# Patient Record
Sex: MALE | Race: WHITE | Employment: OTHER | URBAN - METROPOLITAN AREA
[De-identification: names, ages, dates, MRNs, and addresses within clinical notes are randomized per-mention and may not be internally consistent; named-entity substitution may affect disease eponyms.]

---

## 2017-01-05 ENCOUNTER — OFFICE VISIT (OUTPATIENT)
Dept: INTERNAL MEDICINE CLINIC | Age: 47
End: 2017-01-05

## 2017-01-05 VITALS
RESPIRATION RATE: 16 BRPM | WEIGHT: 216.8 LBS | SYSTOLIC BLOOD PRESSURE: 104 MMHG | DIASTOLIC BLOOD PRESSURE: 74 MMHG | TEMPERATURE: 98.2 F | BODY MASS INDEX: 30.35 KG/M2 | HEIGHT: 71 IN | HEART RATE: 76 BPM

## 2017-01-05 DIAGNOSIS — Z00.00 PHYSICAL EXAM: Primary | ICD-10-CM

## 2017-01-05 DIAGNOSIS — M50.00 DISC DISEASE WITH MYELOPATHY, CERVICAL: ICD-10-CM

## 2017-01-05 DIAGNOSIS — L30.9 ECZEMA, UNSPECIFIED TYPE: ICD-10-CM

## 2017-01-05 DIAGNOSIS — F17.200 SMOKER: ICD-10-CM

## 2017-01-05 DIAGNOSIS — F32.4 MAJOR DEPRESSIVE DISORDER WITH SINGLE EPISODE, IN PARTIAL REMISSION (HCC): ICD-10-CM

## 2017-01-05 DIAGNOSIS — C67.1 MALIGNANT NEOPLASM OF DOME OF URINARY BLADDER (HCC): ICD-10-CM

## 2017-01-05 DIAGNOSIS — J44.9 COPD MIXED TYPE (HCC): ICD-10-CM

## 2017-01-05 PROCEDURE — 99396 PREV VISIT EST AGE 40-64: CPT | Performed by: INTERNAL MEDICINE

## 2017-01-05 RX ORDER — DULOXETIN HYDROCHLORIDE 60 MG/1
60 CAPSULE, DELAYED RELEASE ORAL DAILY
Qty: 30 CAPSULE | Refills: 5 | Status: SHIPPED | OUTPATIENT
Start: 2017-01-05 | End: 2017-10-13 | Stop reason: SDUPTHER

## 2017-01-05 RX ORDER — BETAMETHASONE DIPROPIONATE 0.05 %
OINTMENT (GRAM) TOPICAL
Qty: 45 G | Refills: 1 | Status: SHIPPED | OUTPATIENT
Start: 2017-01-05 | End: 2018-12-06

## 2017-01-05 RX ORDER — BUDESONIDE AND FORMOTEROL FUMARATE DIHYDRATE 160; 4.5 UG/1; UG/1
2 AEROSOL RESPIRATORY (INHALATION) 2 TIMES DAILY
Qty: 1 INHALER | Refills: 5 | Status: SHIPPED | OUTPATIENT
Start: 2017-01-05 | End: 2017-01-30

## 2017-01-05 RX ORDER — TIZANIDINE 4 MG/1
4 TABLET ORAL 3 TIMES DAILY
Qty: 90 TABLET | Refills: 5 | Status: SHIPPED | OUTPATIENT
Start: 2017-01-05 | End: 2018-10-11 | Stop reason: SDUPTHER

## 2017-01-05 ASSESSMENT — ENCOUNTER SYMPTOMS
VOMITING: 0
CONSTIPATION: 0
ABDOMINAL PAIN: 0
CHEST TIGHTNESS: 0
COUGH: 1
SINUS PRESSURE: 0
NAUSEA: 0
DIARRHEA: 0
TROUBLE SWALLOWING: 1
BACK PAIN: 0
VOICE CHANGE: 1
SHORTNESS OF BREATH: 1

## 2017-01-07 ASSESSMENT — ENCOUNTER SYMPTOMS: WHEEZING: 1

## 2017-01-30 ENCOUNTER — OFFICE VISIT (OUTPATIENT)
Dept: INTERNAL MEDICINE CLINIC | Age: 47
End: 2017-01-30

## 2017-01-30 VITALS
RESPIRATION RATE: 16 BRPM | HEART RATE: 68 BPM | OXYGEN SATURATION: 95 % | SYSTOLIC BLOOD PRESSURE: 106 MMHG | DIASTOLIC BLOOD PRESSURE: 66 MMHG | TEMPERATURE: 98.4 F | BODY MASS INDEX: 31.13 KG/M2 | WEIGHT: 224.8 LBS

## 2017-01-30 DIAGNOSIS — J44.1 ACUTE EXACERBATION OF CHRONIC OBSTRUCTIVE PULMONARY DISEASE (COPD) (HCC): Primary | ICD-10-CM

## 2017-01-30 PROCEDURE — 99213 OFFICE O/P EST LOW 20 MIN: CPT | Performed by: INTERNAL MEDICINE

## 2017-01-30 RX ORDER — ALBUTEROL SULFATE 90 UG/1
2 AEROSOL, METERED RESPIRATORY (INHALATION) EVERY 6 HOURS PRN
Qty: 1 INHALER | Refills: 3 | Status: SHIPPED | OUTPATIENT
Start: 2017-01-30 | End: 2020-10-19 | Stop reason: SDUPTHER

## 2017-01-30 RX ORDER — BUDESONIDE AND FORMOTEROL FUMARATE DIHYDRATE 160; 4.5 UG/1; UG/1
2 AEROSOL RESPIRATORY (INHALATION) 2 TIMES DAILY
Qty: 1 INHALER | Refills: 5 | Status: SHIPPED | OUTPATIENT
Start: 2017-01-30 | End: 2020-09-10 | Stop reason: SDUPTHER

## 2017-01-30 RX ORDER — PREDNISONE 10 MG/1
TABLET ORAL
Qty: 22 TABLET | Refills: 0 | Status: SHIPPED | OUTPATIENT
Start: 2017-01-30 | End: 2017-02-09

## 2017-01-30 RX ORDER — HYDROCODONE BITARTRATE AND ACETAMINOPHEN 10; 325 MG/1; MG/1
TABLET ORAL 4 TIMES DAILY
COMMUNITY
Start: 2017-01-12 | End: 2017-08-30 | Stop reason: ALTCHOICE

## 2017-01-30 RX ORDER — LEVOFLOXACIN 500 MG/1
500 TABLET, FILM COATED ORAL DAILY
Qty: 10 TABLET | Refills: 0 | Status: SHIPPED | OUTPATIENT
Start: 2017-01-30 | End: 2017-02-09

## 2017-01-30 ASSESSMENT — ENCOUNTER SYMPTOMS
WHEEZING: 1
DIARRHEA: 0
VOMITING: 0
SHORTNESS OF BREATH: 1
COUGH: 1
RHINORRHEA: 1
BACK PAIN: 1
CONSTIPATION: 0
CHEST TIGHTNESS: 1
ABDOMINAL PAIN: 0
SINUS PRESSURE: 1
NAUSEA: 0

## 2017-02-01 ENCOUNTER — TELEPHONE (OUTPATIENT)
Dept: INTERNAL MEDICINE CLINIC | Age: 47
End: 2017-02-01

## 2017-02-13 ENCOUNTER — OFFICE VISIT (OUTPATIENT)
Dept: INTERNAL MEDICINE CLINIC | Age: 47
End: 2017-02-13

## 2017-02-13 VITALS
RESPIRATION RATE: 16 BRPM | TEMPERATURE: 97.8 F | WEIGHT: 220.8 LBS | HEART RATE: 88 BPM | SYSTOLIC BLOOD PRESSURE: 120 MMHG | BODY MASS INDEX: 30.58 KG/M2 | DIASTOLIC BLOOD PRESSURE: 72 MMHG

## 2017-02-13 DIAGNOSIS — J44.9 COPD MIXED TYPE (HCC): ICD-10-CM

## 2017-02-13 DIAGNOSIS — G89.4 CHRONIC PAIN SYNDROME: ICD-10-CM

## 2017-02-13 DIAGNOSIS — M50.00 DISC DISEASE WITH MYELOPATHY, CERVICAL: ICD-10-CM

## 2017-02-13 DIAGNOSIS — M19.049 HAND ARTHRITIS: Primary | ICD-10-CM

## 2017-02-13 DIAGNOSIS — F32.4 MAJOR DEPRESSIVE DISORDER WITH SINGLE EPISODE, IN PARTIAL REMISSION (HCC): ICD-10-CM

## 2017-02-13 PROCEDURE — 99212 OFFICE O/P EST SF 10 MIN: CPT | Performed by: INTERNAL MEDICINE

## 2017-02-13 RX ORDER — GABAPENTIN 800 MG/1
800 TABLET ORAL 3 TIMES DAILY
Qty: 90 TABLET | Refills: 3 | Status: SHIPPED | OUTPATIENT
Start: 2017-02-13 | End: 2018-12-06 | Stop reason: DRUGHIGH

## 2017-02-13 RX ORDER — LIDOCAINE 50 MG/G
OINTMENT TOPICAL
Qty: 35 G | Refills: 1 | Status: SHIPPED | OUTPATIENT
Start: 2017-02-13 | End: 2018-12-06

## 2017-02-13 ASSESSMENT — ENCOUNTER SYMPTOMS
VOMITING: 0
SINUS PRESSURE: 0
WHEEZING: 0
BACK PAIN: 0
ABDOMINAL PAIN: 0
NAUSEA: 0
CHEST TIGHTNESS: 0
DIARRHEA: 0
SHORTNESS OF BREATH: 0
COUGH: 1
VOICE CHANGE: 1
TROUBLE SWALLOWING: 1
CONSTIPATION: 0

## 2017-03-21 ENCOUNTER — TELEPHONE (OUTPATIENT)
Dept: INTERNAL MEDICINE CLINIC | Age: 47
End: 2017-03-21

## 2017-03-21 RX ORDER — AMOXICILLIN AND CLAVULANATE POTASSIUM 875; 125 MG/1; MG/1
1 TABLET, FILM COATED ORAL 2 TIMES DAILY
Qty: 20 TABLET | Refills: 0 | Status: SHIPPED | OUTPATIENT
Start: 2017-03-21 | End: 2017-03-31

## 2017-03-24 ENCOUNTER — TELEPHONE (OUTPATIENT)
Dept: INTERNAL MEDICINE CLINIC | Age: 47
End: 2017-03-24

## 2017-03-24 DIAGNOSIS — G89.4 CHRONIC PAIN SYNDROME: Primary | ICD-10-CM

## 2017-03-24 DIAGNOSIS — M96.1 POST LAMINECTOMY SYNDROME: ICD-10-CM

## 2017-03-24 DIAGNOSIS — M15.9 PRIMARY OSTEOARTHRITIS INVOLVING MULTIPLE JOINTS: ICD-10-CM

## 2017-03-24 DIAGNOSIS — M50.90 CERVICAL DISC DISEASE: ICD-10-CM

## 2017-03-27 ENCOUNTER — TELEPHONE (OUTPATIENT)
Dept: INTERNAL MEDICINE CLINIC | Age: 47
End: 2017-03-27

## 2017-03-28 ENCOUNTER — TELEPHONE (OUTPATIENT)
Dept: INTERNAL MEDICINE CLINIC | Age: 47
End: 2017-03-28

## 2017-03-30 ENCOUNTER — TELEPHONE (OUTPATIENT)
Dept: INTERNAL MEDICINE CLINIC | Age: 47
End: 2017-03-30

## 2017-03-30 DIAGNOSIS — C67.9 MALIGNANT NEOPLASM OF URINARY BLADDER, UNSPECIFIED SITE (HCC): Primary | ICD-10-CM

## 2017-04-10 ENCOUNTER — TELEPHONE (OUTPATIENT)
Dept: INTERNAL MEDICINE CLINIC | Age: 47
End: 2017-04-10

## 2017-04-11 ENCOUNTER — TELEPHONE (OUTPATIENT)
Dept: INTERNAL MEDICINE CLINIC | Age: 47
End: 2017-04-11

## 2017-04-17 ENCOUNTER — TELEPHONE (OUTPATIENT)
Dept: INTERNAL MEDICINE CLINIC | Age: 47
End: 2017-04-17

## 2017-04-17 DIAGNOSIS — C67.9 MALIGNANT NEOPLASM OF URINARY BLADDER, UNSPECIFIED SITE (HCC): ICD-10-CM

## 2017-04-17 DIAGNOSIS — Z51.81 MEDICATION MONITORING ENCOUNTER: ICD-10-CM

## 2017-04-17 DIAGNOSIS — M15.9 PRIMARY OSTEOARTHRITIS INVOLVING MULTIPLE JOINTS: Primary | ICD-10-CM

## 2017-04-17 DIAGNOSIS — Z13.220 SCREENING CHOLESTEROL LEVEL: ICD-10-CM

## 2017-04-18 ENCOUNTER — TELEPHONE (OUTPATIENT)
Dept: INTERNAL MEDICINE CLINIC | Age: 47
End: 2017-04-18

## 2017-04-22 LAB — GLUCOSE BLD-MCNC: 116 MG/DL

## 2017-04-25 DIAGNOSIS — M15.9 PRIMARY OSTEOARTHRITIS INVOLVING MULTIPLE JOINTS: ICD-10-CM

## 2017-04-25 DIAGNOSIS — Z51.81 MEDICATION MONITORING ENCOUNTER: ICD-10-CM

## 2017-04-25 DIAGNOSIS — C67.9 MALIGNANT NEOPLASM OF URINARY BLADDER, UNSPECIFIED SITE (HCC): ICD-10-CM

## 2017-04-25 DIAGNOSIS — Z13.220 SCREENING CHOLESTEROL LEVEL: ICD-10-CM

## 2017-04-25 RX ORDER — ATORVASTATIN CALCIUM 20 MG/1
20 TABLET, FILM COATED ORAL NIGHTLY
Qty: 30 TABLET | Refills: 2 | Status: SHIPPED | OUTPATIENT
Start: 2017-04-25 | End: 2018-01-30 | Stop reason: SDUPTHER

## 2017-06-14 LAB
CHOLESTEROL, TOTAL: 288 MG/DL
CHOLESTEROL/HDL RATIO: 7
HDLC SERPL-MCNC: 42 MG/DL (ref 35–70)
LDL CHOLESTEROL CALCULATED: 187 MG/DL (ref 0–160)
TRIGL SERPL-MCNC: 295 MG/DL
VLDLC SERPL CALC-MCNC: 59 MG/DL

## 2017-06-20 DIAGNOSIS — M54.2 CERVICAL PAIN (NECK): Primary | ICD-10-CM

## 2017-07-03 ENCOUNTER — NURSE ONLY (OUTPATIENT)
Dept: INTERNAL MEDICINE CLINIC | Age: 47
End: 2017-07-03

## 2017-07-03 ENCOUNTER — TELEPHONE (OUTPATIENT)
Dept: INTERNAL MEDICINE CLINIC | Age: 47
End: 2017-07-03

## 2017-07-03 DIAGNOSIS — Z00.00 ROUTINE GENERAL MEDICAL EXAMINATION AT A HEALTH CARE FACILITY: Primary | ICD-10-CM

## 2017-07-03 DIAGNOSIS — C67.1 MALIGNANT NEOPLASM OF DOME OF URINARY BLADDER (HCC): ICD-10-CM

## 2017-07-03 DIAGNOSIS — M17.31 POST-TRAUMATIC OSTEOARTHRITIS OF RIGHT KNEE: ICD-10-CM

## 2017-07-03 DIAGNOSIS — M50.00 DISC DISEASE WITH MYELOPATHY, CERVICAL: Primary | ICD-10-CM

## 2017-07-03 DIAGNOSIS — J44.9 COPD MIXED TYPE (HCC): ICD-10-CM

## 2017-07-03 LAB
A/G RATIO: 2 (ref 1.1–2.2)
ALBUMIN SERPL-MCNC: 4.7 G/DL (ref 3.4–5)
ALP BLD-CCNC: 76 U/L (ref 40–129)
ALT SERPL-CCNC: 17 U/L (ref 10–40)
ANION GAP SERPL CALCULATED.3IONS-SCNC: 16 MMOL/L (ref 3–16)
AST SERPL-CCNC: 13 U/L (ref 15–37)
BILIRUB SERPL-MCNC: 0.5 MG/DL (ref 0–1)
BUN BLDV-MCNC: 8 MG/DL (ref 7–20)
CALCIUM SERPL-MCNC: 10.1 MG/DL (ref 8.3–10.6)
CHLORIDE BLD-SCNC: 102 MMOL/L (ref 99–110)
CO2: 26 MMOL/L (ref 21–32)
CREAT SERPL-MCNC: 0.9 MG/DL (ref 0.9–1.3)
GFR AFRICAN AMERICAN: >60
GFR NON-AFRICAN AMERICAN: >60
GLOBULIN: 2.3 G/DL
GLUCOSE BLD-MCNC: 104 MG/DL (ref 70–99)
HCT VFR BLD CALC: 48.5 % (ref 40.5–52.5)
HEMOGLOBIN: 16.2 G/DL (ref 13.5–17.5)
MCH RBC QN AUTO: 32.8 PG (ref 26–34)
MCHC RBC AUTO-ENTMCNC: 33.4 G/DL (ref 31–36)
MCV RBC AUTO: 98.2 FL (ref 80–100)
PDW BLD-RTO: 13.3 % (ref 12.4–15.4)
PLATELET # BLD: 132 K/UL (ref 135–450)
PMV BLD AUTO: 11.7 FL (ref 5–10.5)
POTASSIUM SERPL-SCNC: 4 MMOL/L (ref 3.5–5.1)
RBC # BLD: 4.94 M/UL (ref 4.2–5.9)
SODIUM BLD-SCNC: 144 MMOL/L (ref 136–145)
TOTAL PROTEIN: 7 G/DL (ref 6.4–8.2)
WBC # BLD: 10.1 K/UL (ref 4–11)

## 2017-07-03 PROCEDURE — 36415 COLL VENOUS BLD VENIPUNCTURE: CPT | Performed by: INTERNAL MEDICINE

## 2017-08-21 ENCOUNTER — TELEPHONE (OUTPATIENT)
Dept: INTERNAL MEDICINE CLINIC | Age: 47
End: 2017-08-21

## 2017-08-21 DIAGNOSIS — G89.4 CHRONIC PAIN SYNDROME: Primary | ICD-10-CM

## 2017-08-30 ENCOUNTER — OFFICE VISIT (OUTPATIENT)
Dept: INTERNAL MEDICINE CLINIC | Age: 47
End: 2017-08-30

## 2017-08-30 VITALS
HEIGHT: 71 IN | SYSTOLIC BLOOD PRESSURE: 114 MMHG | WEIGHT: 224 LBS | DIASTOLIC BLOOD PRESSURE: 86 MMHG | HEART RATE: 84 BPM | RESPIRATION RATE: 18 BRPM | BODY MASS INDEX: 31.36 KG/M2 | OXYGEN SATURATION: 94 %

## 2017-08-30 DIAGNOSIS — M50.00 CERVICAL DISC DISEASE WITH MYELOPATHY: ICD-10-CM

## 2017-08-30 DIAGNOSIS — R60.0 LOCALIZED EDEMA: Primary | ICD-10-CM

## 2017-08-30 DIAGNOSIS — Z51.81 MEDICATION MONITORING ENCOUNTER: ICD-10-CM

## 2017-08-30 DIAGNOSIS — C67.9 MALIGNANT NEOPLASM OF URINARY BLADDER, UNSPECIFIED SITE (HCC): ICD-10-CM

## 2017-08-30 DIAGNOSIS — G89.4 CHRONIC PAIN SYNDROME: ICD-10-CM

## 2017-08-30 LAB
A/G RATIO: 1.6 (ref 1.1–2.2)
ALBUMIN SERPL-MCNC: 4.2 G/DL (ref 3.4–5)
ALP BLD-CCNC: 71 U/L (ref 40–129)
ALT SERPL-CCNC: 21 U/L (ref 10–40)
ANION GAP SERPL CALCULATED.3IONS-SCNC: 14 MMOL/L (ref 3–16)
AST SERPL-CCNC: 17 U/L (ref 15–37)
BILIRUB SERPL-MCNC: 0.3 MG/DL (ref 0–1)
BUN BLDV-MCNC: 7 MG/DL (ref 7–20)
CALCIUM SERPL-MCNC: 9.9 MG/DL (ref 8.3–10.6)
CHLORIDE BLD-SCNC: 100 MMOL/L (ref 99–110)
CO2: 25 MMOL/L (ref 21–32)
CREAT SERPL-MCNC: 0.7 MG/DL (ref 0.9–1.3)
GFR AFRICAN AMERICAN: >60
GFR NON-AFRICAN AMERICAN: >60
GLOBULIN: 2.6 G/DL
GLUCOSE FASTING: 141 MG/DL (ref 70–99)
HCT VFR BLD CALC: 46.3 % (ref 40.5–52.5)
HEMOGLOBIN: 15.8 G/DL (ref 13.5–17.5)
MCH RBC QN AUTO: 33.1 PG (ref 26–34)
MCHC RBC AUTO-ENTMCNC: 34.2 G/DL (ref 31–36)
MCV RBC AUTO: 96.9 FL (ref 80–100)
PDW BLD-RTO: 13.3 % (ref 12.4–15.4)
PLATELET # BLD: 150 K/UL (ref 135–450)
PMV BLD AUTO: 10.6 FL (ref 5–10.5)
POTASSIUM SERPL-SCNC: 4 MMOL/L (ref 3.5–5.1)
RBC # BLD: 4.77 M/UL (ref 4.2–5.9)
SODIUM BLD-SCNC: 139 MMOL/L (ref 136–145)
TOTAL PROTEIN: 6.8 G/DL (ref 6.4–8.2)
WBC # BLD: 8.5 K/UL (ref 4–11)

## 2017-08-30 PROCEDURE — 36415 COLL VENOUS BLD VENIPUNCTURE: CPT | Performed by: INTERNAL MEDICINE

## 2017-08-30 PROCEDURE — 99214 OFFICE O/P EST MOD 30 MIN: CPT | Performed by: INTERNAL MEDICINE

## 2017-08-30 RX ORDER — FUROSEMIDE 40 MG/1
TABLET ORAL
Qty: 30 TABLET | Refills: 3 | Status: SHIPPED | OUTPATIENT
Start: 2017-08-30 | End: 2018-12-06 | Stop reason: ALTCHOICE

## 2017-08-30 RX ORDER — CYCLOBENZAPRINE HCL 5 MG
TABLET ORAL
COMMUNITY
Start: 2017-07-11 | End: 2018-10-11 | Stop reason: ALTCHOICE

## 2017-08-30 RX ORDER — OXYCODONE AND ACETAMINOPHEN 10; 325 MG/1; MG/1
TABLET ORAL
COMMUNITY
Start: 2016-11-09 | End: 2017-09-12 | Stop reason: SDUPTHER

## 2017-08-30 RX ORDER — FUROSEMIDE 20 MG/1
20 TABLET ORAL DAILY
Qty: 30 TABLET | Refills: 1 | Status: SHIPPED | OUTPATIENT
Start: 2017-08-30 | End: 2017-08-30

## 2017-08-31 ENCOUNTER — TELEPHONE (OUTPATIENT)
Dept: INTERNAL MEDICINE CLINIC | Age: 47
End: 2017-08-31

## 2017-09-04 ENCOUNTER — TELEPHONE (OUTPATIENT)
Dept: INTERNAL MEDICINE CLINIC | Age: 47
End: 2017-09-04

## 2017-09-04 ASSESSMENT — ENCOUNTER SYMPTOMS
CONSTIPATION: 0
NAUSEA: 0
COUGH: 0
ABDOMINAL PAIN: 0
CHEST TIGHTNESS: 0
BACK PAIN: 0
DIARRHEA: 0
VOMITING: 0
WHEEZING: 0
SHORTNESS OF BREATH: 0

## 2017-09-05 ENCOUNTER — TELEPHONE (OUTPATIENT)
Dept: INTERNAL MEDICINE CLINIC | Age: 47
End: 2017-09-05

## 2017-09-06 ENCOUNTER — TELEPHONE (OUTPATIENT)
Dept: INTERNAL MEDICINE CLINIC | Age: 47
End: 2017-09-06

## 2017-09-12 ENCOUNTER — TELEPHONE (OUTPATIENT)
Dept: INTERNAL MEDICINE CLINIC | Age: 47
End: 2017-09-12

## 2017-09-12 RX ORDER — OXYCODONE AND ACETAMINOPHEN 10; 325 MG/1; MG/1
1 TABLET ORAL EVERY 6 HOURS PRN
Qty: 28 TABLET | Refills: 0 | Status: SHIPPED | OUTPATIENT
Start: 2017-09-12 | End: 2017-09-19

## 2017-09-14 ENCOUNTER — TELEPHONE (OUTPATIENT)
Dept: INTERNAL MEDICINE CLINIC | Age: 47
End: 2017-09-14

## 2017-10-13 DIAGNOSIS — M50.00 DISC DISEASE WITH MYELOPATHY, CERVICAL: ICD-10-CM

## 2017-10-13 DIAGNOSIS — F32.4 MAJOR DEPRESSIVE DISORDER WITH SINGLE EPISODE, IN PARTIAL REMISSION (HCC): ICD-10-CM

## 2017-10-16 RX ORDER — DULOXETIN HYDROCHLORIDE 60 MG/1
CAPSULE, DELAYED RELEASE ORAL
Qty: 30 CAPSULE | Refills: 5 | Status: SHIPPED | OUTPATIENT
Start: 2017-10-16 | End: 2018-04-27 | Stop reason: SDUPTHER

## 2018-01-31 RX ORDER — ATORVASTATIN CALCIUM 20 MG/1
TABLET, FILM COATED ORAL
Qty: 90 TABLET | Refills: 1 | Status: SHIPPED | OUTPATIENT
Start: 2018-01-31 | End: 2018-09-04 | Stop reason: SDUPTHER

## 2018-04-27 ENCOUNTER — TELEPHONE (OUTPATIENT)
Dept: INTERNAL MEDICINE CLINIC | Age: 48
End: 2018-04-27

## 2018-04-27 DIAGNOSIS — M50.00 DISC DISEASE WITH MYELOPATHY, CERVICAL: ICD-10-CM

## 2018-04-27 DIAGNOSIS — F32.4 MAJOR DEPRESSIVE DISORDER WITH SINGLE EPISODE, IN PARTIAL REMISSION (HCC): ICD-10-CM

## 2018-04-27 RX ORDER — DULOXETIN HYDROCHLORIDE 60 MG/1
CAPSULE, DELAYED RELEASE ORAL
Qty: 30 CAPSULE | Refills: 5 | Status: SHIPPED | OUTPATIENT
Start: 2018-04-27 | End: 2019-03-06 | Stop reason: ALTCHOICE

## 2018-09-04 ENCOUNTER — OFFICE VISIT (OUTPATIENT)
Dept: INTERNAL MEDICINE CLINIC | Age: 48
End: 2018-09-04

## 2018-09-04 VITALS
BODY MASS INDEX: 32.48 KG/M2 | OXYGEN SATURATION: 96 % | HEART RATE: 85 BPM | WEIGHT: 232 LBS | DIASTOLIC BLOOD PRESSURE: 70 MMHG | HEIGHT: 71 IN | RESPIRATION RATE: 16 BRPM | SYSTOLIC BLOOD PRESSURE: 130 MMHG

## 2018-09-04 DIAGNOSIS — F17.200 SMOKER: ICD-10-CM

## 2018-09-04 DIAGNOSIS — R73.9 HYPERGLYCEMIA: ICD-10-CM

## 2018-09-04 DIAGNOSIS — G62.9 NEUROPATHY: ICD-10-CM

## 2018-09-04 DIAGNOSIS — G89.4 CHRONIC PAIN SYNDROME: Primary | ICD-10-CM

## 2018-09-04 DIAGNOSIS — E78.00 HYPERCHOLESTEREMIA: ICD-10-CM

## 2018-09-04 DIAGNOSIS — F32.1 CURRENT MODERATE EPISODE OF MAJOR DEPRESSIVE DISORDER WITHOUT PRIOR EPISODE (HCC): ICD-10-CM

## 2018-09-04 DIAGNOSIS — M50.00 CERVICAL DISC DISEASE WITH MYELOPATHY: ICD-10-CM

## 2018-09-04 DIAGNOSIS — J44.9 CHRONIC OBSTRUCTIVE PULMONARY DISEASE, UNSPECIFIED COPD TYPE (HCC): ICD-10-CM

## 2018-09-04 LAB
A/G RATIO: 2.1 (ref 1.1–2.2)
ALBUMIN SERPL-MCNC: 4.8 G/DL (ref 3.4–5)
ALP BLD-CCNC: 81 U/L (ref 40–129)
ALT SERPL-CCNC: 33 U/L (ref 10–40)
ANION GAP SERPL CALCULATED.3IONS-SCNC: 12 MMOL/L (ref 3–16)
AST SERPL-CCNC: 21 U/L (ref 15–37)
BASOPHILS ABSOLUTE: 0 K/UL (ref 0–0.2)
BASOPHILS RELATIVE PERCENT: 0.4 %
BILIRUB SERPL-MCNC: 0.3 MG/DL (ref 0–1)
BUN BLDV-MCNC: 7 MG/DL (ref 7–20)
CALCIUM SERPL-MCNC: 9.8 MG/DL (ref 8.3–10.6)
CHLORIDE BLD-SCNC: 103 MMOL/L (ref 99–110)
CHOLESTEROL, TOTAL: 240 MG/DL (ref 0–199)
CO2: 27 MMOL/L (ref 21–32)
CREAT SERPL-MCNC: 0.8 MG/DL (ref 0.9–1.3)
EOSINOPHILS ABSOLUTE: 0.2 K/UL (ref 0–0.6)
EOSINOPHILS RELATIVE PERCENT: 1.7 %
GFR AFRICAN AMERICAN: >60
GFR NON-AFRICAN AMERICAN: >60
GLOBULIN: 2.3 G/DL
GLUCOSE BLD-MCNC: 202 MG/DL (ref 70–99)
HCT VFR BLD CALC: 50 % (ref 40.5–52.5)
HDLC SERPL-MCNC: 41 MG/DL (ref 40–60)
HEMOGLOBIN: 16.9 G/DL (ref 13.5–17.5)
LDL CHOLESTEROL CALCULATED: 155 MG/DL
LYMPHOCYTES ABSOLUTE: 3.4 K/UL (ref 1–5.1)
LYMPHOCYTES RELATIVE PERCENT: 28.5 %
MCH RBC QN AUTO: 33.6 PG (ref 26–34)
MCHC RBC AUTO-ENTMCNC: 33.8 G/DL (ref 31–36)
MCV RBC AUTO: 99.2 FL (ref 80–100)
MONOCYTES ABSOLUTE: 1.2 K/UL (ref 0–1.3)
MONOCYTES RELATIVE PERCENT: 9.7 %
NEUTROPHILS ABSOLUTE: 7.1 K/UL (ref 1.7–7.7)
NEUTROPHILS RELATIVE PERCENT: 59.7 %
PDW BLD-RTO: 13.5 % (ref 12.4–15.4)
PLATELET # BLD: 148 K/UL (ref 135–450)
PMV BLD AUTO: 11.4 FL (ref 5–10.5)
POTASSIUM SERPL-SCNC: 4.4 MMOL/L (ref 3.5–5.1)
RBC # BLD: 5.04 M/UL (ref 4.2–5.9)
SODIUM BLD-SCNC: 142 MMOL/L (ref 136–145)
TOTAL PROTEIN: 7.1 G/DL (ref 6.4–8.2)
TRIGL SERPL-MCNC: 222 MG/DL (ref 0–150)
TSH SERPL DL<=0.05 MIU/L-ACNC: 1.5 UIU/ML (ref 0.27–4.2)
VITAMIN B-12: 578 PG/ML (ref 211–911)
VLDLC SERPL CALC-MCNC: 44 MG/DL
WBC # BLD: 12 K/UL (ref 4–11)

## 2018-09-04 PROCEDURE — G0444 DEPRESSION SCREEN ANNUAL: HCPCS | Performed by: INTERNAL MEDICINE

## 2018-09-04 PROCEDURE — 90732 PPSV23 VACC 2 YRS+ SUBQ/IM: CPT | Performed by: INTERNAL MEDICINE

## 2018-09-04 PROCEDURE — 99214 OFFICE O/P EST MOD 30 MIN: CPT | Performed by: INTERNAL MEDICINE

## 2018-09-04 PROCEDURE — 90471 IMMUNIZATION ADMIN: CPT | Performed by: INTERNAL MEDICINE

## 2018-09-04 RX ORDER — DULOXETIN HYDROCHLORIDE 30 MG/1
30 CAPSULE, DELAYED RELEASE ORAL DAILY
Qty: 30 CAPSULE | Refills: 3 | Status: SHIPPED | OUTPATIENT
Start: 2018-09-04 | End: 2019-01-23 | Stop reason: SDUPTHER

## 2018-09-04 RX ORDER — OXYCODONE HYDROCHLORIDE 10 MG/1
10 TABLET ORAL EVERY 8 HOURS PRN
Qty: 21 TABLET | Refills: 0 | Status: SHIPPED | OUTPATIENT
Start: 2018-09-04 | End: 2018-09-18 | Stop reason: SDUPTHER

## 2018-09-04 RX ORDER — DICLOFENAC SODIUM 75 MG/1
75 TABLET, DELAYED RELEASE ORAL 2 TIMES DAILY
Qty: 60 TABLET | Refills: 3 | Status: SHIPPED | OUTPATIENT
Start: 2018-09-04 | End: 2018-12-06 | Stop reason: ALTCHOICE

## 2018-09-04 RX ORDER — OMEPRAZOLE 20 MG/1
20 CAPSULE, DELAYED RELEASE ORAL
Qty: 30 CAPSULE | Refills: 3 | Status: SHIPPED | OUTPATIENT
Start: 2018-09-04 | End: 2019-03-06 | Stop reason: ALTCHOICE

## 2018-09-04 RX ORDER — ATORVASTATIN CALCIUM 20 MG/1
TABLET, FILM COATED ORAL
Qty: 90 TABLET | Refills: 1 | Status: SHIPPED | OUTPATIENT
Start: 2018-09-04 | End: 2018-09-05 | Stop reason: SDUPTHER

## 2018-09-04 RX ORDER — PREGABALIN 300 MG/1
300 CAPSULE ORAL 2 TIMES DAILY
Qty: 60 CAPSULE | Refills: 1 | Status: SHIPPED | OUTPATIENT
Start: 2018-09-04 | End: 2018-10-11

## 2018-09-04 ASSESSMENT — PATIENT HEALTH QUESTIONNAIRE - PHQ9
7. TROUBLE CONCENTRATING ON THINGS, SUCH AS READING THE NEWSPAPER OR WATCHING TELEVISION: 2
10. IF YOU CHECKED OFF ANY PROBLEMS, HOW DIFFICULT HAVE THESE PROBLEMS MADE IT FOR YOU TO DO YOUR WORK, TAKE CARE OF THINGS AT HOME, OR GET ALONG WITH OTHER PEOPLE: 3
4. FEELING TIRED OR HAVING LITTLE ENERGY: 3
SUM OF ALL RESPONSES TO PHQ QUESTIONS 1-9: 14
3. TROUBLE FALLING OR STAYING ASLEEP: 2
1. LITTLE INTEREST OR PLEASURE IN DOING THINGS: 2
5. POOR APPETITE OR OVEREATING: 0
9. THOUGHTS THAT YOU WOULD BE BETTER OFF DEAD, OR OF HURTING YOURSELF: 0
SUM OF ALL RESPONSES TO PHQ QUESTIONS 1-9: 14
SUM OF ALL RESPONSES TO PHQ9 QUESTIONS 1 & 2: 3
2. FEELING DOWN, DEPRESSED OR HOPELESS: 1
6. FEELING BAD ABOUT YOURSELF - OR THAT YOU ARE A FAILURE OR HAVE LET YOURSELF OR YOUR FAMILY DOWN: 1
8. MOVING OR SPEAKING SO SLOWLY THAT OTHER PEOPLE COULD HAVE NOTICED. OR THE OPPOSITE, BEING SO FIGETY OR RESTLESS THAT YOU HAVE BEEN MOVING AROUND A LOT MORE THAN USUAL: 3

## 2018-09-04 NOTE — PROGRESS NOTES
SUBJECTIVE:  Sid Martin is a 52 y.o. male being evaluated for:    Chief Complaint   Patient presents with    Other     Pain clinic referral       HPI   Pain in hands and feet  Increasing  Describbed as making a broken glass snowball. Feet feel swollen Pain  Off pain meds since the July 19th. At [facility] pain clinic and disagreement about a missed appointment and a cancellation due to insurance changes. Hx of cervical and lumbar disc disease   Cervical fusions in neck  Last one done C5 throuh C6 in [place]  Pain in right side of neck    Jerks throughout the night   Allergies   Allergen Reactions    Wellbutrin [Bupropion] Other (See Comments)     Made him viloent     Current Outpatient Prescriptions   Medication Sig Dispense Refill    pregabalin (LYRICA) 300 MG capsule Take 1 capsule by mouth 2 times daily for 60 days. . 60 capsule 1    DULoxetine (CYMBALTA) 30 MG extended release capsule Take 1 capsule by mouth daily 30 capsule 3    diclofenac (VOLTAREN) 75 MG EC tablet Take 1 tablet by mouth 2 times daily 60 tablet 3    oxyCODONE HCl (OXY-IR) 10 MG immediate release tablet Take 1 tablet by mouth every 8 hours as needed for Pain for up to 7 days. . 21 tablet 0    DULoxetine (CYMBALTA) 60 MG extended release capsule TAKE ONE CAPSULE BY MOUTH ONCE DAILY 30 capsule 5    atorvastatin (LIPITOR) 20 MG tablet TAKE ONE TABLET BY MOUTH NIGHTLY 90 tablet 1    furosemide (LASIX) 40 MG tablet Take 1/2 tablet daily 30 tablet 3    gabapentin (NEURONTIN) 800 MG tablet Take 1 tablet by mouth 3 times daily 90 tablet 3    lidocaine (XYLOCAINE) 5 % ointment Apply topically to affected joints  Every 4 hours as needed. 35 g 1    albuterol sulfate HFA (PROAIR HFA) 108 (90 BASE) MCG/ACT inhaler Inhale 2 puffs into the lungs every 6 hours as needed for Wheezing 1 Inhaler 3    budesonide-formoterol (SYMBICORT) 160-4.5 MCG/ACT AERO Inhale 2 puffs into the lungs 2 times daily Rinse mouth after use.  1 Inhaler 5    pain.   Neurological: Positive for tremors (Jerking) and numbness (arms ). Negative for dizziness, weakness, light-headedness and headaches. paraesthesias   Psychiatric/Behavioral: Positive for agitation, decreased concentration, dysphoric mood and sleep disturbance. Negative for suicidal ideas. OBJECTIVE:  /70 (Site: Right Arm, Position: Sitting, Cuff Size: Medium Adult)   Pulse 85   Resp 16   Ht 5' 11\" (1.803 m)   Wt 232 lb (105.2 kg)   SpO2 96%   BMI 32.36 kg/m²      Body mass index is 32.36 kg/m². Physical Exam    ASSESSMENT/PLAN:    Murray Weeks was seen today for other. Diagnoses and all orders for this visit:    Chronic pain syndrome Will give pain medication. Hopefully can refer him to pain management  -     External Referral To Pain Clinic  -     oxyCODONE HCl (OXY-IR) 10 MG immediate release tablet; Take 1 tablet by mouth every 8 hours as needed for Pain for up to 7 days. .    Major depressive disorder with single episode, in partial remission (HCC) increase his duloxetine.  -     DULoxetine (CYMBALTA) 30 MG extended release capsule; Take 1 capsule by mouth daily    Cervical disc disease with myelopathy to continue his Voltaren  -     External Referral To Pain Clinic  -     diclofenac (VOLTAREN) 75 MG EC tablet; Take 1 tablet by mouth 2 times daily  -     CBC Auto Differential    Neuropathy we'll change to Lyrica and see if this helps. To call with swelling forgetfulness increasing depression  -     pregabalin (LYRICA) 300 MG capsule; Take 1 capsule by mouth 2 times daily for 60 days. .  -     External Referral To Pain Clinic  -     DULoxetine (CYMBALTA) 30 MG extended release capsule; Take 1 capsule by mouth daily  -     TSH without Reflex  -     VITAMIN B12  -     oxyCODONE HCl (OXY-IR) 10 MG immediate release tablet; Take 1 tablet by mouth every 8 hours as needed for Pain for up to 7 days. .    Smoker discussed cessation  -     Pneumococcal polysaccharide vaccine 23-valent greater than or equal to 1yo subcutaneous/IM    Hypercholesteremia  -     Comprehensive Metabolic Panel  -     Lipid Panel    Chronic obstructive pulmonary disease, unspecified COPD type (HCC) using Symbicort and albuterol/ did discuss cessation    Hyperglycemia  -     Hemoglobin A1C        Orders Placed This Encounter   Medications    pregabalin (LYRICA) 300 MG capsule     Sig: Take 1 capsule by mouth 2 times daily for 60 days. .     Dispense:  60 capsule     Refill:  1    DULoxetine (CYMBALTA) 30 MG extended release capsule     Sig: Take 1 capsule by mouth daily     Dispense:  30 capsule     Refill:  3    diclofenac (VOLTAREN) 75 MG EC tablet     Sig: Take 1 tablet by mouth 2 times daily     Dispense:  60 tablet     Refill:  3    oxyCODONE HCl (OXY-IR) 10 MG immediate release tablet     Sig: Take 1 tablet by mouth every 8 hours as needed for Pain for up to 7 days. .     Dispense:  21 tablet     Refill:  0        Return in about 2 months (around 11/4/2018). There are no Patient Instructions on file for this visit.

## 2018-09-05 DIAGNOSIS — E11.9 TYPE 2 DIABETES MELLITUS WITHOUT COMPLICATION, WITHOUT LONG-TERM CURRENT USE OF INSULIN (HCC): Primary | ICD-10-CM

## 2018-09-05 LAB
ESTIMATED AVERAGE GLUCOSE: 159.9 MG/DL
HBA1C MFR BLD: 7.2 %

## 2018-09-05 RX ORDER — METFORMIN HYDROCHLORIDE 500 MG/1
500 TABLET, EXTENDED RELEASE ORAL
Qty: 90 TABLET | Refills: 1 | Status: SHIPPED | OUTPATIENT
Start: 2018-09-05 | End: 2019-04-01 | Stop reason: SDUPTHER

## 2018-09-05 RX ORDER — ATORVASTATIN CALCIUM 20 MG/1
TABLET, FILM COATED ORAL
Qty: 90 TABLET | Refills: 1 | Status: SHIPPED | OUTPATIENT
Start: 2018-09-05 | End: 2018-12-06 | Stop reason: SDUPTHER

## 2018-09-15 PROBLEM — E78.00 HYPERCHOLESTEREMIA: Status: ACTIVE | Noted: 2018-09-15

## 2018-09-15 PROBLEM — G62.9 NEUROPATHY: Status: ACTIVE | Noted: 2018-09-15

## 2018-09-15 ASSESSMENT — ENCOUNTER SYMPTOMS
SHORTNESS OF BREATH: 1
VOMITING: 0
DIARRHEA: 0
BACK PAIN: 0
NAUSEA: 0
COUGH: 0
CONSTIPATION: 0
ABDOMINAL PAIN: 0

## 2018-09-18 ENCOUNTER — TELEPHONE (OUTPATIENT)
Dept: INTERNAL MEDICINE CLINIC | Age: 48
End: 2018-09-18

## 2018-09-18 DIAGNOSIS — G62.9 NEUROPATHY: ICD-10-CM

## 2018-09-18 DIAGNOSIS — G89.4 CHRONIC PAIN SYNDROME: ICD-10-CM

## 2018-09-18 RX ORDER — OXYCODONE HYDROCHLORIDE 10 MG/1
10 TABLET ORAL EVERY 8 HOURS PRN
Qty: 90 TABLET | Refills: 0 | Status: SHIPPED | OUTPATIENT
Start: 2018-09-18 | End: 2018-10-18

## 2018-09-18 NOTE — TELEPHONE ENCOUNTER
Question who he is seeing on 1022. I will give him enough for a month but after that he needs to get them from pain management.  First please make sugars no Percocet at [facility] from 9/12

## 2018-09-18 NOTE — TELEPHONE ENCOUNTER
Patient will be seeing Dr Ronen Damon on 10/22/18.      Referral done and faxed to Dr Ronen Damon @ [phone]    Previous pain management dr was Dr Linda Vieyra

## 2018-09-26 ENCOUNTER — TELEPHONE (OUTPATIENT)
Dept: INTERNAL MEDICINE CLINIC | Age: 48
End: 2018-09-26

## 2018-09-26 RX ORDER — BLOOD-GLUCOSE METER
EACH MISCELLANEOUS
Qty: 1 KIT | Refills: 0 | Status: SHIPPED | OUTPATIENT
Start: 2018-09-26 | End: 2020-10-19 | Stop reason: SDUPTHER

## 2018-09-26 RX ORDER — LANCETS 33 GAUGE
EACH MISCELLANEOUS
Qty: 100 EACH | Refills: 3 | Status: SHIPPED | OUTPATIENT
Start: 2018-09-26

## 2018-09-26 NOTE — TELEPHONE ENCOUNTER
Stop the lyrica and see if the symptoms go away  If increasing events to the ed and question if it is safe to drive

## 2018-10-11 ENCOUNTER — OFFICE VISIT (OUTPATIENT)
Dept: INTERNAL MEDICINE CLINIC | Age: 48
End: 2018-10-11
Payer: COMMERCIAL

## 2018-10-11 VITALS
HEART RATE: 83 BPM | WEIGHT: 233 LBS | TEMPERATURE: 98 F | BODY MASS INDEX: 32.62 KG/M2 | OXYGEN SATURATION: 93 % | DIASTOLIC BLOOD PRESSURE: 78 MMHG | SYSTOLIC BLOOD PRESSURE: 110 MMHG | HEIGHT: 71 IN

## 2018-10-11 DIAGNOSIS — E78.00 HYPERCHOLESTEREMIA: ICD-10-CM

## 2018-10-11 DIAGNOSIS — M50.00 DISC DISEASE WITH MYELOPATHY, CERVICAL: ICD-10-CM

## 2018-10-11 DIAGNOSIS — G25.81 RLS (RESTLESS LEGS SYNDROME): ICD-10-CM

## 2018-10-11 DIAGNOSIS — R55 SYNCOPE, UNSPECIFIED SYNCOPE TYPE: Primary | ICD-10-CM

## 2018-10-11 DIAGNOSIS — E11.9 TYPE 2 DIABETES MELLITUS WITHOUT COMPLICATION, WITHOUT LONG-TERM CURRENT USE OF INSULIN (HCC): ICD-10-CM

## 2018-10-11 DIAGNOSIS — J44.9 CHRONIC OBSTRUCTIVE PULMONARY DISEASE, UNSPECIFIED COPD TYPE (HCC): ICD-10-CM

## 2018-10-11 PROCEDURE — 99214 OFFICE O/P EST MOD 30 MIN: CPT | Performed by: INTERNAL MEDICINE

## 2018-10-11 PROCEDURE — 93000 ELECTROCARDIOGRAM COMPLETE: CPT | Performed by: INTERNAL MEDICINE

## 2018-10-11 RX ORDER — BENZONATATE 100 MG/1
100 CAPSULE ORAL 3 TIMES DAILY PRN
Qty: 30 CAPSULE | Refills: 1 | Status: SHIPPED | OUTPATIENT
Start: 2018-10-11 | End: 2018-12-06 | Stop reason: ALTCHOICE

## 2018-10-11 RX ORDER — TIZANIDINE 4 MG/1
4 TABLET ORAL 3 TIMES DAILY
Qty: 90 TABLET | Refills: 5 | Status: SHIPPED | OUTPATIENT
Start: 2018-10-11 | End: 2020-12-24 | Stop reason: SDUPTHER

## 2018-10-11 RX ORDER — AZITHROMYCIN 250 MG/1
TABLET, FILM COATED ORAL
Qty: 6 TABLET | Refills: 0 | Status: SHIPPED | OUTPATIENT
Start: 2018-10-11 | End: 2018-12-06 | Stop reason: ALTCHOICE

## 2018-10-11 RX ORDER — ROPINIROLE 0.5 MG/1
0.5 TABLET, FILM COATED ORAL NIGHTLY
Qty: 30 TABLET | Refills: 2 | Status: SHIPPED | OUTPATIENT
Start: 2018-10-11 | End: 2019-02-19 | Stop reason: SDUPTHER

## 2018-10-11 RX ORDER — ASPIRIN 81 MG/1
81 TABLET ORAL DAILY
Qty: 30 TABLET | Refills: 3 | Status: SHIPPED | OUTPATIENT
Start: 2018-10-11 | End: 2018-12-06 | Stop reason: SINTOL

## 2018-10-11 NOTE — PATIENT INSTRUCTIONS
Please call your pharmacy if you need any refills of your medication(s). Please call our office at [phone] if you don't hear from us about your test results. Bring an accurate list of your medications with you at every appointment to ensure that we have the correct information.     Our office hours are: Monday - Friday 8:30 am- 5 pm    Phone lines turn on at 8:30 am

## 2018-10-14 ASSESSMENT — ENCOUNTER SYMPTOMS
VOMITING: 0
CONSTIPATION: 0
BACK PAIN: 0
COUGH: 1
NAUSEA: 0
ABDOMINAL PAIN: 0
WHEEZING: 1
SHORTNESS OF BREATH: 1
DIARRHEA: 0

## 2018-12-06 ENCOUNTER — OFFICE VISIT (OUTPATIENT)
Dept: INTERNAL MEDICINE CLINIC | Age: 48
End: 2018-12-06
Payer: COMMERCIAL

## 2018-12-06 VITALS
HEIGHT: 71 IN | SYSTOLIC BLOOD PRESSURE: 98 MMHG | TEMPERATURE: 98 F | BODY MASS INDEX: 31.72 KG/M2 | DIASTOLIC BLOOD PRESSURE: 60 MMHG | RESPIRATION RATE: 16 BRPM | HEART RATE: 80 BPM | WEIGHT: 226.6 LBS | OXYGEN SATURATION: 93 %

## 2018-12-06 DIAGNOSIS — Z79.4 TYPE 2 DIABETES MELLITUS WITHOUT COMPLICATION, WITH LONG-TERM CURRENT USE OF INSULIN (HCC): ICD-10-CM

## 2018-12-06 DIAGNOSIS — R41.3 MEMORY LOSS: ICD-10-CM

## 2018-12-06 DIAGNOSIS — F33.41 RECURRENT MAJOR DEPRESSIVE DISORDER, IN PARTIAL REMISSION (HCC): ICD-10-CM

## 2018-12-06 DIAGNOSIS — M50.90 CERVICAL DISC DISEASE: ICD-10-CM

## 2018-12-06 DIAGNOSIS — C67.9 MALIGNANT NEOPLASM OF URINARY BLADDER, UNSPECIFIED SITE (HCC): ICD-10-CM

## 2018-12-06 DIAGNOSIS — E11.9 TYPE 2 DIABETES MELLITUS WITHOUT COMPLICATION, WITH LONG-TERM CURRENT USE OF INSULIN (HCC): ICD-10-CM

## 2018-12-06 DIAGNOSIS — G25.81 RLS (RESTLESS LEGS SYNDROME): ICD-10-CM

## 2018-12-06 DIAGNOSIS — E78.00 HYPERCHOLESTEREMIA: ICD-10-CM

## 2018-12-06 DIAGNOSIS — G89.4 CHRONIC PAIN SYNDROME: ICD-10-CM

## 2018-12-06 DIAGNOSIS — R55 SYNCOPE, UNSPECIFIED SYNCOPE TYPE: ICD-10-CM

## 2018-12-06 DIAGNOSIS — R10.13 EPIGASTRIC PAIN: ICD-10-CM

## 2018-12-06 DIAGNOSIS — E11.9 TYPE 2 DIABETES MELLITUS WITHOUT COMPLICATION, WITHOUT LONG-TERM CURRENT USE OF INSULIN (HCC): ICD-10-CM

## 2018-12-06 DIAGNOSIS — Z00.00 PHYSICAL EXAM, ANNUAL: Primary | ICD-10-CM

## 2018-12-06 DIAGNOSIS — Z13.31 POSITIVE DEPRESSION SCREENING: ICD-10-CM

## 2018-12-06 LAB
BILIRUBIN, POC: NORMAL
BLOOD URINE, POC: NORMAL
CLARITY, POC: CLEAR
COLOR, POC: NORMAL
CREATININE URINE: 229.6 MG/DL (ref 39–259)
GLUCOSE URINE, POC: NORMAL
HBA1C MFR BLD: 6.1 %
KETONES, POC: NORMAL
LEUKOCYTE EST, POC: NORMAL
MICROALBUMIN UR-MCNC: <1.2 MG/DL
MICROALBUMIN/CREAT UR-RTO: NORMAL MG/G (ref 0–30)
NITRITE, POC: NORMAL
PH, POC: 6
PROTEIN, POC: NORMAL
SPECIFIC GRAVITY, POC: 1.02
UROBILINOGEN, POC: NORMAL

## 2018-12-06 PROCEDURE — G8431 POS CLIN DEPRES SCRN F/U DOC: HCPCS | Performed by: INTERNAL MEDICINE

## 2018-12-06 PROCEDURE — 83036 HEMOGLOBIN GLYCOSYLATED A1C: CPT | Performed by: INTERNAL MEDICINE

## 2018-12-06 PROCEDURE — 99396 PREV VISIT EST AGE 40-64: CPT | Performed by: INTERNAL MEDICINE

## 2018-12-06 PROCEDURE — 81002 URINALYSIS NONAUTO W/O SCOPE: CPT | Performed by: INTERNAL MEDICINE

## 2018-12-06 RX ORDER — ATORVASTATIN CALCIUM 20 MG/1
TABLET, FILM COATED ORAL
Qty: 90 TABLET | Refills: 1 | Status: SHIPPED | OUTPATIENT
Start: 2018-12-06 | End: 2019-09-16 | Stop reason: SDUPTHER

## 2018-12-06 RX ORDER — CELECOXIB 200 MG/1
200 CAPSULE ORAL DAILY
Qty: 90 CAPSULE | Refills: 1 | Status: SHIPPED | OUTPATIENT
Start: 2018-12-06 | End: 2019-03-06 | Stop reason: DRUGHIGH

## 2018-12-06 RX ORDER — OXYCODONE HYDROCHLORIDE 10 MG/1
10 TABLET ORAL 4 TIMES DAILY
COMMUNITY
Start: 2018-11-29

## 2018-12-06 RX ORDER — GABAPENTIN 600 MG/1
600 TABLET ORAL 3 TIMES DAILY
COMMUNITY
Start: 2018-10-27 | End: 2018-12-06 | Stop reason: SDUPTHER

## 2018-12-06 RX ORDER — GABAPENTIN 600 MG/1
600 TABLET ORAL 3 TIMES DAILY
Qty: 90 TABLET | Refills: 0 | Status: SHIPPED | OUTPATIENT
Start: 2018-12-06 | End: 2019-03-06 | Stop reason: DRUGHIGH

## 2018-12-06 ASSESSMENT — ENCOUNTER SYMPTOMS
DIARRHEA: 0
BLOOD IN STOOL: 0
WHEEZING: 0
COUGH: 0
ROS SKIN COMMENTS: NO CONCERNING SKIN LESIONS
VOMITING: 0
CONSTIPATION: 1
ABDOMINAL PAIN: 1
NAUSEA: 0
SINUS PRESSURE: 0
SHORTNESS OF BREATH: 0
BACK PAIN: 1

## 2018-12-06 NOTE — PATIENT INSTRUCTIONS
Please call your pharmacy if you need any refills of your medication(s). Please call our office at [phone] if you don't hear from us about your test results. Bring an accurate list of your medications with you at every appointment to ensure that we have the correct information.     Our office hours are: Monday - Friday 8:30 am- 5 pm    Phone lines turn on at 8:30 am

## 2018-12-14 ENCOUNTER — HOSPITAL ENCOUNTER (OUTPATIENT)
Dept: MRI IMAGING | Age: 48
Discharge: HOME | End: 2018-12-14
Payer: COMMERCIAL

## 2018-12-14 DIAGNOSIS — R41.3 MEMORY LOSS: ICD-10-CM

## 2018-12-14 PROCEDURE — 6360000004 HC RX CONTRAST MEDICATION: Performed by: INTERNAL MEDICINE

## 2018-12-14 PROCEDURE — 70553 MRI BRAIN STEM W/O & W/DYE: CPT

## 2018-12-14 PROCEDURE — A9577 INJ MULTIHANCE: HCPCS | Performed by: INTERNAL MEDICINE

## 2018-12-14 RX ADMIN — GADOBENATE DIMEGLUMINE 20 ML: 529 INJECTION, SOLUTION INTRAVENOUS at 08:34

## 2018-12-21 DIAGNOSIS — M50.00 DISC DISEASE WITH MYELOPATHY, CERVICAL: ICD-10-CM

## 2018-12-21 DIAGNOSIS — F32.4 MAJOR DEPRESSIVE DISORDER WITH SINGLE EPISODE, IN PARTIAL REMISSION (HCC): ICD-10-CM

## 2018-12-21 RX ORDER — DULOXETIN HYDROCHLORIDE 60 MG/1
CAPSULE, DELAYED RELEASE ORAL
Qty: 30 CAPSULE | Refills: 5 | Status: SHIPPED | OUTPATIENT
Start: 2018-12-21 | End: 2019-07-05 | Stop reason: SDUPTHER

## 2018-12-31 PROCEDURE — 90688 IIV4 VACCINE SPLT 0.5 ML IM: CPT | Performed by: INTERNAL MEDICINE

## 2018-12-31 PROCEDURE — 90471 IMMUNIZATION ADMIN: CPT | Performed by: INTERNAL MEDICINE

## 2018-12-31 NOTE — PROGRESS NOTES
Vaccine Information Sheet, \"Influenza - Inactivated\"  given to Cricket Partida, or parent/legal guardian of  Cricket Partida and verbalized understanding. Patient responses:    Have you ever had a reaction to a flu vaccine? No  Are you able to eat eggs without adverse effects? Yes  Do you have any current illness? No  Have you ever had Guillian Conneaut Syndrome? No    Flu vaccine given per order. Please see immunization tab.
complication, with long-term current use of insulin (HCC)  -     Comprehensive Metabolic Panel; Future  -     Lipid Panel; Future    Recurrent major depressive disorder, in partial remission ([facility] [address]  Better with high dose cymbalta    Malignant neoplasm of urinary bladder, unspecified site [facility]) stressed need to follow up with Dr Kacie Saleem  Try to get records to see when last seen   -     External Referral To Urology  -     POCT Urinalysis no Micro    Epigastric pain  -     BLOOD OCCULT STOOL SCREEN #3; Future  -     CBC; Future    Syncope, unspecified syncope type    Positive depression screening  -     Positive Screen for Clinical Depression with a Documented Follow-up Plan U933    Vaccine needs   -     INFLUENZA, QUADV, 3 YRS AND OLDER, IM, MDV,         Orders Placed This Encounter   Medications    celecoxib (CELEBREX) 200 MG capsule     Sig: Take 1 capsule by mouth daily     Dispense:  90 capsule     Refill:  1    gabapentin (NEURONTIN) 600 MG tablet     Sig: Take 1 tablet by mouth 3 times daily for 30 days. .     Dispense:  90 tablet     Refill:  0    atorvastatin (LIPITOR) 20 MG tablet     Sig: TAKE ONE TABLET BY MOUTH NIGHTLY     Dispense:  90 tablet     Refill:  1     Please consider 90 day supplies to promote better adherence        Return in about 3 months (around 3/6/2019). Patient Instructions   Please call your pharmacy if you need any refills of your medication(s). Please call our office at [phone] if you don't hear from us about your test results. Bring an accurate list of your medications with you at every appointment to ensure that we have the correct information.     Our office hours are: Monday - Friday 8:30 am- 5 pm    Phone lines turn on at 8:30 am           On the basis of positive PHQ-9 screening ( ), the following plan was implemented: medication prescribed: Cymbalta- 90 mg- patient will call for any significant medication

## 2019-01-16 DIAGNOSIS — Z79.4 TYPE 2 DIABETES MELLITUS WITHOUT COMPLICATION, WITH LONG-TERM CURRENT USE OF INSULIN (HCC): ICD-10-CM

## 2019-01-16 DIAGNOSIS — R10.13 EPIGASTRIC PAIN: ICD-10-CM

## 2019-01-16 DIAGNOSIS — E11.9 TYPE 2 DIABETES MELLITUS WITHOUT COMPLICATION, WITH LONG-TERM CURRENT USE OF INSULIN (HCC): ICD-10-CM

## 2019-01-17 ENCOUNTER — TELEPHONE (OUTPATIENT)
Dept: INTERNAL MEDICINE CLINIC | Age: 49
End: 2019-01-17

## 2019-01-17 DIAGNOSIS — C67.9 MALIGNANT NEOPLASM OF URINARY BLADDER, UNSPECIFIED SITE (HCC): Primary | ICD-10-CM

## 2019-01-23 DIAGNOSIS — F32.1 CURRENT MODERATE EPISODE OF MAJOR DEPRESSIVE DISORDER WITHOUT PRIOR EPISODE (HCC): ICD-10-CM

## 2019-01-23 DIAGNOSIS — G62.9 NEUROPATHY: ICD-10-CM

## 2019-01-23 RX ORDER — DULOXETIN HYDROCHLORIDE 30 MG/1
CAPSULE, DELAYED RELEASE ORAL
Qty: 30 CAPSULE | Refills: 5 | Status: SHIPPED | OUTPATIENT
Start: 2019-01-23 | End: 2019-08-10 | Stop reason: SDUPTHER

## 2019-01-24 ENCOUNTER — TELEPHONE (OUTPATIENT)
Dept: INTERNAL MEDICINE CLINIC | Age: 49
End: 2019-01-24

## 2019-02-19 DIAGNOSIS — G25.81 RLS (RESTLESS LEGS SYNDROME): ICD-10-CM

## 2019-02-19 RX ORDER — ROPINIROLE 0.5 MG/1
0.5 TABLET, FILM COATED ORAL NIGHTLY
Qty: 30 TABLET | Refills: 5 | Status: SHIPPED | OUTPATIENT
Start: 2019-02-19 | End: 2019-03-06 | Stop reason: DRUGHIGH

## 2019-03-06 ENCOUNTER — OFFICE VISIT (OUTPATIENT)
Dept: INTERNAL MEDICINE CLINIC | Age: 49
End: 2019-03-06
Payer: COMMERCIAL

## 2019-03-06 VITALS
RESPIRATION RATE: 16 BRPM | SYSTOLIC BLOOD PRESSURE: 100 MMHG | TEMPERATURE: 97.7 F | DIASTOLIC BLOOD PRESSURE: 70 MMHG | OXYGEN SATURATION: 97 % | WEIGHT: 226.6 LBS | BODY MASS INDEX: 32.05 KG/M2 | HEART RATE: 71 BPM

## 2019-03-06 DIAGNOSIS — J44.9 CHRONIC OBSTRUCTIVE PULMONARY DISEASE, UNSPECIFIED COPD TYPE (HCC): ICD-10-CM

## 2019-03-06 DIAGNOSIS — G25.81 RLS (RESTLESS LEGS SYNDROME): ICD-10-CM

## 2019-03-06 DIAGNOSIS — F32.4 MAJOR DEPRESSIVE DISORDER WITH SINGLE EPISODE, IN PARTIAL REMISSION (HCC): ICD-10-CM

## 2019-03-06 DIAGNOSIS — Z79.4 TYPE 2 DIABETES MELLITUS WITHOUT COMPLICATION, WITH LONG-TERM CURRENT USE OF INSULIN (HCC): Primary | ICD-10-CM

## 2019-03-06 DIAGNOSIS — G62.9 NEUROPATHY: ICD-10-CM

## 2019-03-06 DIAGNOSIS — E11.9 TYPE 2 DIABETES MELLITUS WITHOUT COMPLICATION, WITH LONG-TERM CURRENT USE OF INSULIN (HCC): Primary | ICD-10-CM

## 2019-03-06 PROCEDURE — 99214 OFFICE O/P EST MOD 30 MIN: CPT | Performed by: INTERNAL MEDICINE

## 2019-03-06 RX ORDER — GABAPENTIN 800 MG/1
800 TABLET ORAL 3 TIMES DAILY
Qty: 90 TABLET | Refills: 2 | Status: SHIPPED | OUTPATIENT
Start: 2019-03-06 | End: 2019-10-08 | Stop reason: DRUGHIGH

## 2019-03-06 RX ORDER — CELECOXIB 200 MG/1
200 CAPSULE ORAL 2 TIMES DAILY
Qty: 180 CAPSULE | Refills: 1 | Status: SHIPPED | OUTPATIENT
Start: 2019-03-06 | End: 2019-10-08 | Stop reason: ALTCHOICE

## 2019-03-06 RX ORDER — ROPINIROLE 1 MG/1
1 TABLET, FILM COATED ORAL NIGHTLY
Qty: 90 TABLET | Refills: 3 | Status: SHIPPED | OUTPATIENT
Start: 2019-03-06 | End: 2020-03-09

## 2019-03-08 ASSESSMENT — ENCOUNTER SYMPTOMS
COUGH: 0
WHEEZING: 0
BLOOD IN STOOL: 0
CONSTIPATION: 1
VOMITING: 0
BACK PAIN: 1
DIARRHEA: 0
NAUSEA: 0
ABDOMINAL PAIN: 0
SHORTNESS OF BREATH: 0

## 2019-04-01 RX ORDER — METFORMIN HYDROCHLORIDE 500 MG/1
TABLET, EXTENDED RELEASE ORAL
Qty: 90 TABLET | Refills: 1 | Status: SHIPPED | OUTPATIENT
Start: 2019-04-01 | End: 2020-02-20

## 2019-05-16 ENCOUNTER — NURSE ONLY (OUTPATIENT)
Dept: INTERNAL MEDICINE CLINIC | Age: 49
End: 2019-05-16
Payer: COMMERCIAL

## 2019-05-16 ENCOUNTER — TELEPHONE (OUTPATIENT)
Dept: INTERNAL MEDICINE CLINIC | Age: 49
End: 2019-05-16

## 2019-05-16 DIAGNOSIS — Z20.828 EXPOSURE TO MEASLES VIRUS: Primary | ICD-10-CM

## 2019-05-16 PROCEDURE — 90707 MMR VACCINE SC: CPT | Performed by: INTERNAL MEDICINE

## 2019-05-16 PROCEDURE — 90471 IMMUNIZATION ADMIN: CPT | Performed by: INTERNAL MEDICINE

## 2019-07-05 DIAGNOSIS — M50.00 DISC DISEASE WITH MYELOPATHY, CERVICAL: ICD-10-CM

## 2019-07-05 DIAGNOSIS — F32.4 MAJOR DEPRESSIVE DISORDER WITH SINGLE EPISODE, IN PARTIAL REMISSION (HCC): ICD-10-CM

## 2019-07-05 RX ORDER — DULOXETIN HYDROCHLORIDE 60 MG/1
CAPSULE, DELAYED RELEASE ORAL
Qty: 30 CAPSULE | Refills: 5 | Status: SHIPPED | OUTPATIENT
Start: 2019-07-05 | End: 2019-07-08

## 2019-07-08 DIAGNOSIS — M50.00 DISC DISEASE WITH MYELOPATHY, CERVICAL: ICD-10-CM

## 2019-07-08 DIAGNOSIS — F32.4 MAJOR DEPRESSIVE DISORDER WITH SINGLE EPISODE, IN PARTIAL REMISSION (HCC): ICD-10-CM

## 2019-07-08 RX ORDER — DULOXETIN HYDROCHLORIDE 60 MG/1
CAPSULE, DELAYED RELEASE ORAL
Qty: 30 CAPSULE | Refills: 4 | Status: SHIPPED | OUTPATIENT
Start: 2019-07-08 | End: 2020-06-01

## 2019-08-10 DIAGNOSIS — F32.1 CURRENT MODERATE EPISODE OF MAJOR DEPRESSIVE DISORDER WITHOUT PRIOR EPISODE (HCC): ICD-10-CM

## 2019-08-10 DIAGNOSIS — G62.9 NEUROPATHY: ICD-10-CM

## 2019-08-12 RX ORDER — DULOXETIN HYDROCHLORIDE 30 MG/1
CAPSULE, DELAYED RELEASE ORAL
Qty: 30 CAPSULE | Refills: 5 | Status: SHIPPED | OUTPATIENT
Start: 2019-08-12 | End: 2020-02-06

## 2019-09-16 DIAGNOSIS — E78.2 MIXED HYPERLIPIDEMIA: ICD-10-CM

## 2019-09-16 DIAGNOSIS — Z51.81 MEDICATION MONITORING ENCOUNTER: ICD-10-CM

## 2019-09-16 DIAGNOSIS — E11.9 TYPE 2 DIABETES MELLITUS WITHOUT COMPLICATION, WITH LONG-TERM CURRENT USE OF INSULIN (HCC): Primary | ICD-10-CM

## 2019-09-16 DIAGNOSIS — Z79.4 TYPE 2 DIABETES MELLITUS WITHOUT COMPLICATION, WITH LONG-TERM CURRENT USE OF INSULIN (HCC): Primary | ICD-10-CM

## 2019-09-16 DIAGNOSIS — M15.9 PRIMARY OSTEOARTHRITIS INVOLVING MULTIPLE JOINTS: ICD-10-CM

## 2019-09-16 RX ORDER — ATORVASTATIN CALCIUM 20 MG/1
TABLET, FILM COATED ORAL
Qty: 30 TABLET | Refills: 5 | Status: SHIPPED | OUTPATIENT
Start: 2019-09-16 | End: 2020-04-08

## 2019-10-08 ENCOUNTER — OFFICE VISIT (OUTPATIENT)
Dept: INTERNAL MEDICINE CLINIC | Age: 49
End: 2019-10-08
Payer: COMMERCIAL

## 2019-10-08 VITALS
DIASTOLIC BLOOD PRESSURE: 68 MMHG | SYSTOLIC BLOOD PRESSURE: 106 MMHG | HEIGHT: 71 IN | WEIGHT: 221 LBS | TEMPERATURE: 97.8 F | HEART RATE: 72 BPM | BODY MASS INDEX: 30.94 KG/M2 | OXYGEN SATURATION: 95 %

## 2019-10-08 DIAGNOSIS — E11.9 TYPE 2 DIABETES MELLITUS WITHOUT COMPLICATION, WITH LONG-TERM CURRENT USE OF INSULIN (HCC): ICD-10-CM

## 2019-10-08 DIAGNOSIS — E55.9 VITAMIN D DEFICIENCY: ICD-10-CM

## 2019-10-08 DIAGNOSIS — R07.9 CHEST PAIN, UNSPECIFIED TYPE: Primary | ICD-10-CM

## 2019-10-08 DIAGNOSIS — G62.9 NEUROPATHY: ICD-10-CM

## 2019-10-08 DIAGNOSIS — J44.1 CHRONIC OBSTRUCTIVE PULMONARY DISEASE WITH ACUTE EXACERBATION (HCC): ICD-10-CM

## 2019-10-08 DIAGNOSIS — E78.2 MIXED HYPERLIPIDEMIA: ICD-10-CM

## 2019-10-08 DIAGNOSIS — Z79.4 TYPE 2 DIABETES MELLITUS WITHOUT COMPLICATION, WITH LONG-TERM CURRENT USE OF INSULIN (HCC): ICD-10-CM

## 2019-10-08 PROBLEM — M96.1 CERVICAL POST-LAMINECTOMY SYNDROME: Status: ACTIVE | Noted: 2019-10-08

## 2019-10-08 PROBLEM — M47.816 LUMBAR SPONDYLOSIS: Status: ACTIVE | Noted: 2019-10-08

## 2019-10-08 LAB
A/G RATIO: 2.4 (ref 1.1–2.2)
ALBUMIN SERPL-MCNC: 4.8 G/DL (ref 3.4–5)
ALP BLD-CCNC: 75 U/L (ref 40–129)
ALT SERPL-CCNC: 22 U/L (ref 10–40)
ANION GAP SERPL CALCULATED.3IONS-SCNC: 16 MMOL/L (ref 3–16)
AST SERPL-CCNC: 17 U/L (ref 15–37)
BILIRUB SERPL-MCNC: 0.4 MG/DL (ref 0–1)
BUN BLDV-MCNC: 8 MG/DL (ref 7–20)
CALCIUM SERPL-MCNC: 10 MG/DL (ref 8.3–10.6)
CHLORIDE BLD-SCNC: 101 MMOL/L (ref 99–110)
CHOLESTEROL, TOTAL: 151 MG/DL (ref 0–199)
CO2: 27 MMOL/L (ref 21–32)
CREAT SERPL-MCNC: 0.9 MG/DL (ref 0.9–1.3)
GFR AFRICAN AMERICAN: >60
GFR NON-AFRICAN AMERICAN: >60
GLOBULIN: 2 G/DL
GLUCOSE BLD-MCNC: 156 MG/DL (ref 70–99)
HCT VFR BLD CALC: 44 % (ref 40.5–52.5)
HDLC SERPL-MCNC: 44 MG/DL (ref 40–60)
HEMOGLOBIN: 14.9 G/DL (ref 13.5–17.5)
LDL CHOLESTEROL CALCULATED: 63 MG/DL
MCH RBC QN AUTO: 33.4 PG (ref 26–34)
MCHC RBC AUTO-ENTMCNC: 33.9 G/DL (ref 31–36)
MCV RBC AUTO: 98.5 FL (ref 80–100)
PDW BLD-RTO: 14 % (ref 12.4–15.4)
PLATELET # BLD: 140 K/UL (ref 135–450)
PMV BLD AUTO: 11 FL (ref 5–10.5)
POTASSIUM SERPL-SCNC: 4.3 MMOL/L (ref 3.5–5.1)
RBC # BLD: 4.46 M/UL (ref 4.2–5.9)
SODIUM BLD-SCNC: 144 MMOL/L (ref 136–145)
TOTAL PROTEIN: 6.8 G/DL (ref 6.4–8.2)
TRIGL SERPL-MCNC: 219 MG/DL (ref 0–150)
TROPONIN: <0.01 NG/ML
TSH SERPL DL<=0.05 MIU/L-ACNC: 2.72 UIU/ML (ref 0.27–4.2)
VITAMIN D 25-HYDROXY: 32.4 NG/ML
VLDLC SERPL CALC-MCNC: 44 MG/DL
WBC # BLD: 9.9 K/UL (ref 4–11)

## 2019-10-08 PROCEDURE — G8427 DOCREV CUR MEDS BY ELIG CLIN: HCPCS | Performed by: INTERNAL MEDICINE

## 2019-10-08 PROCEDURE — 93000 ELECTROCARDIOGRAM COMPLETE: CPT | Performed by: INTERNAL MEDICINE

## 2019-10-08 PROCEDURE — G8417 CALC BMI ABV UP PARAM F/U: HCPCS | Performed by: INTERNAL MEDICINE

## 2019-10-08 PROCEDURE — 3044F HG A1C LEVEL LT 7.0%: CPT | Performed by: INTERNAL MEDICINE

## 2019-10-08 PROCEDURE — G8482 FLU IMMUNIZE ORDER/ADMIN: HCPCS | Performed by: INTERNAL MEDICINE

## 2019-10-08 PROCEDURE — 99214 OFFICE O/P EST MOD 30 MIN: CPT | Performed by: INTERNAL MEDICINE

## 2019-10-08 PROCEDURE — 3023F SPIROM DOC REV: CPT | Performed by: INTERNAL MEDICINE

## 2019-10-08 PROCEDURE — 2022F DILAT RTA XM EVC RTNOPTHY: CPT | Performed by: INTERNAL MEDICINE

## 2019-10-08 PROCEDURE — G8926 SPIRO NO PERF OR DOC: HCPCS | Performed by: INTERNAL MEDICINE

## 2019-10-08 PROCEDURE — 4004F PT TOBACCO SCREEN RCVD TLK: CPT | Performed by: INTERNAL MEDICINE

## 2019-10-08 RX ORDER — GABAPENTIN 600 MG/1
600 TABLET ORAL 3 TIMES DAILY
COMMUNITY

## 2019-10-08 RX ORDER — ASPIRIN 81 MG/1
81 TABLET ORAL DAILY
Qty: 30 TABLET | Refills: 5 | Status: SHIPPED | OUTPATIENT
Start: 2019-10-08 | End: 2022-05-16 | Stop reason: ALTCHOICE

## 2019-10-08 RX ORDER — SULFAMETHOXAZOLE AND TRIMETHOPRIM 800; 160 MG/1; MG/1
1 TABLET ORAL 2 TIMES DAILY
Qty: 20 TABLET | Refills: 0 | Status: SHIPPED | OUTPATIENT
Start: 2019-10-08 | End: 2019-10-18

## 2019-10-08 RX ORDER — PREDNISONE 20 MG/1
20 TABLET ORAL DAILY
Qty: 10 TABLET | Refills: 0 | Status: SHIPPED | OUTPATIENT
Start: 2019-10-08 | End: 2019-10-18

## 2019-10-09 LAB
ESTIMATED AVERAGE GLUCOSE: 134.1 MG/DL
HBA1C MFR BLD: 6.3 %

## 2019-10-16 ENCOUNTER — NURSE ONLY (OUTPATIENT)
Dept: INTERNAL MEDICINE CLINIC | Age: 49
End: 2019-10-16
Payer: MEDICARE

## 2019-10-16 DIAGNOSIS — Z23 NEED FOR IMMUNIZATION AGAINST INFLUENZA: Primary | ICD-10-CM

## 2019-10-16 PROCEDURE — G0008 ADMIN INFLUENZA VIRUS VAC: HCPCS | Performed by: INTERNAL MEDICINE

## 2019-10-16 PROCEDURE — 90686 IIV4 VACC NO PRSV 0.5 ML IM: CPT | Performed by: INTERNAL MEDICINE

## 2019-10-18 PROBLEM — E78.2 MIXED HYPERLIPIDEMIA: Status: ACTIVE | Noted: 2019-10-18

## 2019-10-18 PROBLEM — E11.9 TYPE 2 DIABETES MELLITUS WITHOUT COMPLICATION, WITH LONG-TERM CURRENT USE OF INSULIN (HCC): Status: ACTIVE | Noted: 2019-10-18

## 2019-10-18 PROBLEM — Z79.4 TYPE 2 DIABETES MELLITUS WITHOUT COMPLICATION, WITH LONG-TERM CURRENT USE OF INSULIN (HCC): Status: ACTIVE | Noted: 2019-10-18

## 2019-10-18 ASSESSMENT — ENCOUNTER SYMPTOMS
VOMITING: 0
BLOOD IN STOOL: 0
ABDOMINAL PAIN: 0
DIARRHEA: 0
SORE THROAT: 0
SHORTNESS OF BREATH: 1
COUGH: 1
NAUSEA: 0

## 2019-11-04 ENCOUNTER — TELEPHONE (OUTPATIENT)
Dept: INTERNAL MEDICINE CLINIC | Age: 49
End: 2019-11-04

## 2019-12-09 ENCOUNTER — OFFICE VISIT (OUTPATIENT)
Dept: INTERNAL MEDICINE CLINIC | Age: 49
End: 2019-12-09
Payer: COMMERCIAL

## 2019-12-09 VITALS
WEIGHT: 222.4 LBS | SYSTOLIC BLOOD PRESSURE: 110 MMHG | BODY MASS INDEX: 31.14 KG/M2 | HEART RATE: 66 BPM | OXYGEN SATURATION: 97 % | DIASTOLIC BLOOD PRESSURE: 68 MMHG | TEMPERATURE: 97.7 F | HEIGHT: 71 IN

## 2019-12-09 DIAGNOSIS — M50.00 DISC DISEASE WITH MYELOPATHY, CERVICAL: ICD-10-CM

## 2019-12-09 DIAGNOSIS — Z79.4 TYPE 2 DIABETES MELLITUS WITHOUT COMPLICATION, WITH LONG-TERM CURRENT USE OF INSULIN (HCC): ICD-10-CM

## 2019-12-09 DIAGNOSIS — G47.33 OBSTRUCTIVE SLEEP APNEA SYNDROME: ICD-10-CM

## 2019-12-09 DIAGNOSIS — N52.9 ERECTILE DYSFUNCTION, UNSPECIFIED ERECTILE DYSFUNCTION TYPE: ICD-10-CM

## 2019-12-09 DIAGNOSIS — E78.2 MIXED HYPERLIPIDEMIA: ICD-10-CM

## 2019-12-09 DIAGNOSIS — Z00.00 PHYSICAL EXAM, ANNUAL: Primary | ICD-10-CM

## 2019-12-09 DIAGNOSIS — E11.9 TYPE 2 DIABETES MELLITUS WITHOUT COMPLICATION, WITH LONG-TERM CURRENT USE OF INSULIN (HCC): ICD-10-CM

## 2019-12-09 DIAGNOSIS — F32.4 MAJOR DEPRESSIVE DISORDER WITH SINGLE EPISODE, IN PARTIAL REMISSION (HCC): ICD-10-CM

## 2019-12-09 DIAGNOSIS — F17.200 SMOKER: ICD-10-CM

## 2019-12-09 DIAGNOSIS — J44.1 CHRONIC OBSTRUCTIVE PULMONARY DISEASE WITH ACUTE EXACERBATION (HCC): ICD-10-CM

## 2019-12-09 DIAGNOSIS — F51.01 PRIMARY INSOMNIA: ICD-10-CM

## 2019-12-09 DIAGNOSIS — M54.9 MID BACK PAIN: ICD-10-CM

## 2019-12-09 LAB
BILIRUBIN, POC: NORMAL
BLOOD URINE, POC: NORMAL
CLARITY, POC: CLEAR
COLOR, POC: NORMAL
CREATININE URINE: 249.1 MG/DL (ref 39–259)
GLUCOSE URINE, POC: NORMAL
KETONES, POC: NORMAL
LEUKOCYTE EST, POC: NORMAL
MICROALBUMIN UR-MCNC: <1.2 MG/DL
MICROALBUMIN/CREAT UR-RTO: NORMAL MG/G (ref 0–30)
NITRITE, POC: NORMAL
PH, POC: 5
PROTEIN, POC: NORMAL
SPECIFIC GRAVITY, POC: 1.03
UROBILINOGEN, POC: NORMAL

## 2019-12-09 PROCEDURE — 81002 URINALYSIS NONAUTO W/O SCOPE: CPT | Performed by: INTERNAL MEDICINE

## 2019-12-09 PROCEDURE — G8482 FLU IMMUNIZE ORDER/ADMIN: HCPCS | Performed by: INTERNAL MEDICINE

## 2019-12-09 PROCEDURE — G0439 PPPS, SUBSEQ VISIT: HCPCS | Performed by: INTERNAL MEDICINE

## 2019-12-09 RX ORDER — BENZONATATE 100 MG/1
100 CAPSULE ORAL 3 TIMES DAILY PRN
Qty: 30 CAPSULE | Refills: 3 | Status: SHIPPED | OUTPATIENT
Start: 2019-12-09 | End: 2020-01-18

## 2019-12-09 RX ORDER — TADALAFIL 20 MG/1
20 TABLET ORAL DAILY PRN
Qty: 10 TABLET | Refills: 5 | Status: SHIPPED | OUTPATIENT
Start: 2019-12-09 | End: 2020-09-10 | Stop reason: SDUPTHER

## 2019-12-09 RX ORDER — TRAZODONE HYDROCHLORIDE 50 MG/1
50 TABLET ORAL NIGHTLY PRN
Qty: 30 TABLET | Refills: 2 | Status: SHIPPED | OUTPATIENT
Start: 2019-12-09 | End: 2020-10-19 | Stop reason: SDUPTHER

## 2019-12-09 RX ORDER — VARENICLINE TARTRATE 0.5 MG/1
.5-1 TABLET, FILM COATED ORAL SEE ADMIN INSTRUCTIONS
Qty: 57 TABLET | Refills: 0 | Status: SHIPPED | OUTPATIENT
Start: 2019-12-09 | End: 2020-10-19

## 2019-12-09 ASSESSMENT — ENCOUNTER SYMPTOMS
COUGH: 1
BACK PAIN: 1
WHEEZING: 1
CONSTIPATION: 0
ABDOMINAL PAIN: 0
NAUSEA: 0
VOMITING: 0
BLOOD IN STOOL: 0
DIARRHEA: 0
SHORTNESS OF BREATH: 1
ROS SKIN COMMENTS: NO CONCERNING SKIN LESIONS
SINUS PRESSURE: 0

## 2019-12-11 ENCOUNTER — TELEPHONE (OUTPATIENT)
Dept: INTERNAL MEDICINE CLINIC | Age: 49
End: 2019-12-11

## 2020-02-06 RX ORDER — DULOXETIN HYDROCHLORIDE 30 MG/1
CAPSULE, DELAYED RELEASE ORAL
Qty: 30 CAPSULE | Refills: 0 | Status: SHIPPED | OUTPATIENT
Start: 2020-02-06 | End: 2020-10-19 | Stop reason: DRUGHIGH

## 2020-02-20 RX ORDER — METFORMIN HYDROCHLORIDE 500 MG/1
TABLET, EXTENDED RELEASE ORAL
Qty: 90 TABLET | Refills: 0 | Status: SHIPPED | OUTPATIENT
Start: 2020-02-20 | End: 2020-05-26

## 2020-03-18 ENCOUNTER — TELEPHONE (OUTPATIENT)
Dept: FAMILY MEDICINE CLINIC | Age: 50
End: 2020-03-18

## 2020-03-18 NOTE — TELEPHONE ENCOUNTER
Pt to call back couldn't remember if he got the EMG done. Stated he has to ask his wife if he did and where it was done.

## 2020-03-23 RX ORDER — ROPINIROLE 1 MG/1
1 TABLET, FILM COATED ORAL NIGHTLY
Qty: 30 TABLET | Refills: 3 | Status: SHIPPED | OUTPATIENT
Start: 2020-03-23 | End: 2020-08-19

## 2020-04-08 RX ORDER — ATORVASTATIN CALCIUM 20 MG/1
TABLET, FILM COATED ORAL
Qty: 90 TABLET | Refills: 1 | Status: SHIPPED | OUTPATIENT
Start: 2020-04-08 | End: 2020-11-30

## 2020-05-26 RX ORDER — METFORMIN HYDROCHLORIDE 500 MG/1
TABLET, EXTENDED RELEASE ORAL
Qty: 90 TABLET | Refills: 0 | Status: SHIPPED | OUTPATIENT
Start: 2020-05-26 | End: 2020-06-09 | Stop reason: RX

## 2020-06-01 RX ORDER — DULOXETIN HYDROCHLORIDE 60 MG/1
CAPSULE, DELAYED RELEASE ORAL
Qty: 30 CAPSULE | Refills: 0 | Status: SHIPPED | OUTPATIENT
Start: 2020-06-01 | End: 2020-07-02

## 2020-06-08 ENCOUNTER — TELEPHONE (OUTPATIENT)
Dept: FAMILY MEDICINE CLINIC | Age: 50
End: 2020-06-08

## 2020-06-09 NOTE — TELEPHONE ENCOUNTER
Currently there is a shortage of the long-acting metformin due to a recall. I will need to change you to short acting, question how your sugars are doing. Will call in short release to take in the morning before breakfast.  If sugars start to go up especially in the evening to let us know.

## 2020-07-02 RX ORDER — DULOXETIN HYDROCHLORIDE 60 MG/1
CAPSULE, DELAYED RELEASE ORAL
Qty: 30 CAPSULE | Refills: 5 | Status: SHIPPED | OUTPATIENT
Start: 2020-07-02 | End: 2021-01-04

## 2020-08-19 RX ORDER — ROPINIROLE 1 MG/1
1 TABLET, FILM COATED ORAL NIGHTLY
Qty: 30 TABLET | Refills: 3 | Status: SHIPPED | OUTPATIENT
Start: 2020-08-19 | End: 2020-12-18

## 2020-09-09 NOTE — TELEPHONE ENCOUNTER
Due for visit and labs by the end of the year.   Labs are in the computer he can go anywhere and ask him if he wants us to send him anywhere

## 2020-09-10 ENCOUNTER — OFFICE VISIT (OUTPATIENT)
Dept: FAMILY MEDICINE CLINIC | Age: 50
End: 2020-09-10
Payer: COMMERCIAL

## 2020-09-10 VITALS
DIASTOLIC BLOOD PRESSURE: 82 MMHG | TEMPERATURE: 98 F | OXYGEN SATURATION: 96 % | HEART RATE: 64 BPM | HEIGHT: 71 IN | SYSTOLIC BLOOD PRESSURE: 124 MMHG | BODY MASS INDEX: 31.64 KG/M2 | WEIGHT: 226 LBS

## 2020-09-10 DIAGNOSIS — R53.83 FATIGUE, UNSPECIFIED TYPE: ICD-10-CM

## 2020-09-10 DIAGNOSIS — R41.3 MEMORY LOSS: ICD-10-CM

## 2020-09-10 DIAGNOSIS — Z12.5 PROSTATE CANCER SCREENING: ICD-10-CM

## 2020-09-10 LAB
BASOPHILS ABSOLUTE: 0 K/UL (ref 0–0.2)
BASOPHILS RELATIVE PERCENT: 0.3 %
EOSINOPHILS ABSOLUTE: 0.2 K/UL (ref 0–0.6)
EOSINOPHILS RELATIVE PERCENT: 2.3 %
HCT VFR BLD CALC: 48.1 % (ref 40.5–52.5)
HEMOGLOBIN: 16.1 G/DL (ref 13.5–17.5)
LYMPHOCYTES ABSOLUTE: 3.8 K/UL (ref 1–5.1)
LYMPHOCYTES RELATIVE PERCENT: 35 %
MCH RBC QN AUTO: 33 PG (ref 26–34)
MCHC RBC AUTO-ENTMCNC: 33.4 G/DL (ref 31–36)
MCV RBC AUTO: 98.7 FL (ref 80–100)
MONOCYTES ABSOLUTE: 1.2 K/UL (ref 0–1.3)
MONOCYTES RELATIVE PERCENT: 11.6 %
NEUTROPHILS ABSOLUTE: 5.5 K/UL (ref 1.7–7.7)
NEUTROPHILS RELATIVE PERCENT: 50.8 %
PDW BLD-RTO: 13.3 % (ref 12.4–15.4)
PLATELET # BLD: 136 K/UL (ref 135–450)
PMV BLD AUTO: 11.6 FL (ref 5–10.5)
PROSTATE SPECIFIC ANTIGEN: 0.5 NG/ML (ref 0–4)
RBC # BLD: 4.87 M/UL (ref 4.2–5.9)
TSH SERPL DL<=0.05 MIU/L-ACNC: 3.2 UIU/ML (ref 0.27–4.2)
VITAMIN B-12: 1134 PG/ML (ref 211–911)
WBC # BLD: 10.7 K/UL (ref 4–11)

## 2020-09-10 PROCEDURE — 3023F SPIROM DOC REV: CPT | Performed by: INTERNAL MEDICINE

## 2020-09-10 PROCEDURE — 4004F PT TOBACCO SCREEN RCVD TLK: CPT | Performed by: INTERNAL MEDICINE

## 2020-09-10 PROCEDURE — 3046F HEMOGLOBIN A1C LEVEL >9.0%: CPT | Performed by: INTERNAL MEDICINE

## 2020-09-10 PROCEDURE — G8417 CALC BMI ABV UP PARAM F/U: HCPCS | Performed by: INTERNAL MEDICINE

## 2020-09-10 PROCEDURE — G8427 DOCREV CUR MEDS BY ELIG CLIN: HCPCS | Performed by: INTERNAL MEDICINE

## 2020-09-10 PROCEDURE — 90471 IMMUNIZATION ADMIN: CPT | Performed by: INTERNAL MEDICINE

## 2020-09-10 PROCEDURE — 99214 OFFICE O/P EST MOD 30 MIN: CPT | Performed by: INTERNAL MEDICINE

## 2020-09-10 PROCEDURE — 90686 IIV4 VACC NO PRSV 0.5 ML IM: CPT | Performed by: INTERNAL MEDICINE

## 2020-09-10 PROCEDURE — G8926 SPIRO NO PERF OR DOC: HCPCS | Performed by: INTERNAL MEDICINE

## 2020-09-10 PROCEDURE — 2022F DILAT RTA XM EVC RTNOPTHY: CPT | Performed by: INTERNAL MEDICINE

## 2020-09-10 PROCEDURE — 3017F COLORECTAL CA SCREEN DOC REV: CPT | Performed by: INTERNAL MEDICINE

## 2020-09-10 RX ORDER — TADALAFIL 20 MG/1
20 TABLET ORAL DAILY PRN
Qty: 10 TABLET | Refills: 5 | Status: SHIPPED | OUTPATIENT
Start: 2020-09-10 | End: 2021-01-28 | Stop reason: SINTOL

## 2020-09-10 RX ORDER — ALBUTEROL SULFATE 90 UG/1
2 AEROSOL, METERED RESPIRATORY (INHALATION) 4 TIMES DAILY PRN
Qty: 1 INHALER | Refills: 5 | Status: SHIPPED | OUTPATIENT
Start: 2020-09-10 | End: 2021-12-17

## 2020-09-10 ASSESSMENT — ENCOUNTER SYMPTOMS
CONSTIPATION: 0
TROUBLE SWALLOWING: 0
BACK PAIN: 1
WHEEZING: 0
BLOOD IN STOOL: 0
SHORTNESS OF BREATH: 1
COUGH: 1
VOMITING: 0
DIARRHEA: 0
SINUS PRESSURE: 0
ABDOMINAL PAIN: 0
NAUSEA: 0

## 2020-09-10 NOTE — PROGRESS NOTES
9/10/2020    Anthony Delacruz (:  1970) is a 48 y.o. male, here for evaluation of the following medical concerns:    HPI  Cant get motivated  Sleeping too much  Denies depression  Not hearing his alarm  Does snore when he sleeps. Has not been checking his sugars  Cigarettes 1 1/2 packs a day  Short of breath  Has lost his albuterol       Review of Systems   Constitutional: Positive for activity change and fatigue. Negative for unexpected weight change. HENT: Negative for congestion, nosebleeds, sinus pressure and trouble swallowing. Eyes: Negative for visual disturbance. Respiratory: Positive for cough and shortness of breath. Negative for wheezing. Cardiovascular: Negative for chest pain, palpitations and leg swelling. Gastrointestinal: Negative for abdominal pain, blood in stool, constipation, diarrhea, nausea and vomiting. Endocrine: Positive for heat intolerance. Negative for cold intolerance. Genitourinary: Positive for frequency. Negative for difficulty urinating and hematuria. No cystocopy    Musculoskeletal: Positive for back pain and neck stiffness (tired ). Neurological: Positive for headaches. Negative for dizziness and light-headedness. Feet feel hot cold or raw    Psychiatric/Behavioral: Positive for decreased concentration, dysphoric mood and sleep disturbance (too much  ). Hard to focus and remember stuff        Prior to Visit Medications    Medication Sig Taking?  Authorizing Provider   Capsaicin 0.075 % STCK Topically daily Yes Joel Almeida MD   albuterol sulfate HFA (VENTOLIN HFA) 108 (90 Base) MCG/ACT inhaler Inhale 2 puffs into the lungs 4 times daily as needed for Wheezing Yes Joel Almeida MD   tadalafil (CIALIS) 20 MG tablet Take 1 tablet by mouth daily as needed for Erectile Dysfunction Yes Joel Almeida MD   fluticasone-salmeterol (WIXELA INHUB) 500-50 MCG/DOSE diskus inhaler Inhale 1 puff into the lungs 2 times daily Yes Reggy Bosworth TOUCH ULTRA 2) w/Device KIT Test Sugar Once Daily at Alternating Times of Day, DX: E11.9 Yes Mile Lawrence MD   [facility] MISC Test Sugar Once Daily at Alternating Times of Day, DX: E11.9 Yes Mile Lawrence MD   albuterol sulfate HFA (PROAIR HFA) 108 (90 BASE) MCG/ACT inhaler Inhale 2 puffs into the lungs every 6 hours as needed for Wheezing Yes Mile Lawrence MD        Allergies   Allergen Reactions    Lyrica [Pregabalin] Other (See Comments)     \"made him black out\"    Wellbutrin [Bupropion] Other (See Comments)     Made him viloent       Past Medical History:   Diagnosis Date    Bladder carcinoma ([address]     Carpal tunnel syndrome, bilateral     Cervical disc disease     COPD (chronic obstructive pulmonary disease) (HCC)     mild but still smoking    Depression     Lumbar disc disease     Neck pain     Osteoarthritis     Type 2 diabetes mellitus, controlled ([facility] [address] 09/04/2018       Past Surgical History:   Procedure Laterality Date    BLADDER TUMOR EXCISION      CARPAL TUNNEL RELEASE Bilateral 06/2015    CERVICAL DISCECTOMY  11/18/2016    CHOLECYSTECTOMY      KNEE SURGERY Right     TONSILLECTOMY         Social History     Socioeconomic History    Marital status:      Spouse name: Not on file    Number of children: Not on file    Years of education: Not on file    Highest education level: Not on file   Occupational History    Not on file   Social Needs    Financial resource strain: Not on file    Food insecurity     Worry: Not on file     Inability: Not on file   [employer] needs     Medical: Not on file     Non-medical: Not on file   Tobacco Use    Smoking status: Current Every Day Smoker     Packs/day: 1.50     Years: 31.00     Pack years: 46.50     Types: Cigarettes    Smokeless tobacco: Never Used   Substance and Sexual Activity    Alcohol use: Yes     Comment: social    Drug use: No    Sexual activity: Yes     Partners: Female   Lifestyle    Physical activity     Days per week: Not on file     Minutes per session: Not on file    Stress: Not on file   Relationships    Social connections     Talks on phone: Not on file     Gets together: Not on file     Attends [employer] service: Not on file     Active member of club or organization: Not on file     Attends meetings of clubs or organizations: Not on file     Relationship status: Not on file    Intimate partner violence     Fear of current or ex partner: Not on file     Emotionally abused: Not on file     Physically abused: Not on file     Forced sexual activity: Not on file   Other Topics Concern    Not on file   Social History Narrative    Not on file        Family History   Problem Relation Age of Onset    COPD Mother     Depression Father         manic    Coronary Art Dis Maternal Grandfather     Cancer Maternal Grandfather         skin cancer     Diabetes Paternal Grandmother     Cancer Maternal Uncle         does not know the type     No Known Problems Paternal Grandfather        Vitals:    09/10/20 0952   BP: 124/82   Site: Left Upper Arm   Position: Sitting   Cuff Size: Medium Adult   Pulse: 64   Temp: 98 °F (36.7 °C)   TempSrc: Oral   SpO2: 96%   Weight: 226 lb (102.5 kg)   Height: 5' 10.5\" (1.791 m)     Estimated body mass index is 31.97 kg/m² as calculated from the following:    Height as of this encounter: 5' 10.5\" (1.791 m). Weight as of this encounter: 226 lb (102.5 kg). Physical Exam  Constitutional:       General: He is not in acute distress. Appearance: Normal appearance. He is well-developed. HENT:      Head: Normocephalic and atraumatic. Right Ear: External ear normal.      Left Ear: External ear normal.      Nose: No congestion. Mouth/Throat:      Pharynx: Oropharynx is clear. Eyes:      Conjunctiva/sclera: Conjunctivae normal.   Neck:      Musculoskeletal: Neck supple. Thyroid: No thyromegaly. Vascular: No carotid bruit or JVD. Cardiovascular:      Rate and Rhythm: Normal rate and regular rhythm. Heart sounds: Normal heart sounds. No murmur. No friction rub. No gallop. Comments:    Pulmonary:      Effort: Pulmonary effort is normal. No respiratory distress. Breath sounds: No wheezing. Chest:      Chest wall: No tenderness. Abdominal:      General: There is no distension. Palpations: Abdomen is soft. Tenderness: There is no abdominal tenderness. Comments: No hsm   Musculoskeletal:         General: No swelling. Lymphadenopathy:      Cervical: No cervical adenopathy. Neurological:      General: No focal deficit present. Mental Status: He is alert. Gait: Gait normal.      Comments: Memory 2/3 and 3/3 with clue      Psychiatric:         Behavior: Behavior normal.         Thought Content: Thought content normal.      Comments: Seems a little depressed         ASSESSMENT/PLAN:  1. Type 2 diabetes mellitus without complication, with long-term current use of insulin ([address]  Sugars  have been controlled has order for labs    2. Major depressive disorder with single episode, in partial remission (HCC) increase Cymbalta to 90 mg daily      3. Chronic obstructive pulmonary disease, unspecified COPD type ([address]  Resume all his inhalers    4. Fatigue, unspecified type  Neck labs. Question  depression    5. 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 Asif Potts it is been much too long    6. Smoker  Discussed cessation    7. Need for immunization against influenza  - INFLUENZA, QUADV, 3 YRS AND OLDER, IM PF, PREFILL SYR OR SDV, 0.5ML (AFLURIA QUADV, PF)      Return in about 6 months (around 3/10/2021), or if symptoms worsen or fail to improve.     An  electronic signature was used to authenticate this note.    --Jose Bass MD on 9/20/2020 at 2:08 PM

## 2020-09-10 NOTE — PROGRESS NOTES
Vaccine Information Sheet, \"Influenza - Inactivated\"  given to Fabiola Mir, or parent/legal guardian of  Fabiola Mir and verbalized understanding. Patient responses:    Have you ever had a reaction to a flu vaccine? No  Do you have any current illness? No  Have you ever had Guillian Paterson Syndrome? No  Do you have a serious allergy to any of the follow: Neomycin, Polymyxin, Thimerosal, eggs or egg products? No    Flu vaccine given per order. Please see immunization tab. Risks and benefits explained. Current VIS given.

## 2020-09-12 LAB — TESTOSTERONE TOTAL: 398 NG/DL (ref 220–1000)

## 2020-09-15 ENCOUNTER — TELEPHONE (OUTPATIENT)
Dept: FAMILY MEDICINE CLINIC | Age: 50
End: 2020-09-15

## 2020-09-15 NOTE — TELEPHONE ENCOUNTER
Spoke with the patient's wife she said at [facility] a sleep study was mentioned. Kennedy López was wanting to know since labs show no cause for the fatigue if he can go ahead with the sleep study.

## 2020-09-15 NOTE — TELEPHONE ENCOUNTER
Lm for pt to return call regarding sleep study. I did call Sheila(spouse) and gave her the referral information.

## 2020-10-19 ENCOUNTER — OFFICE VISIT (OUTPATIENT)
Dept: FAMILY MEDICINE CLINIC | Age: 50
End: 2020-10-19
Payer: COMMERCIAL

## 2020-10-19 VITALS
SYSTOLIC BLOOD PRESSURE: 116 MMHG | HEART RATE: 70 BPM | OXYGEN SATURATION: 97 % | DIASTOLIC BLOOD PRESSURE: 70 MMHG | HEIGHT: 71 IN | TEMPERATURE: 97.3 F | WEIGHT: 225.4 LBS | BODY MASS INDEX: 31.56 KG/M2

## 2020-10-19 DIAGNOSIS — E78.00 HYPERCHOLESTEREMIA: ICD-10-CM

## 2020-10-19 DIAGNOSIS — E11.9 TYPE 2 DIABETES MELLITUS WITHOUT COMPLICATION, WITH LONG-TERM CURRENT USE OF INSULIN (HCC): ICD-10-CM

## 2020-10-19 DIAGNOSIS — Z79.4 TYPE 2 DIABETES MELLITUS WITHOUT COMPLICATION, WITH LONG-TERM CURRENT USE OF INSULIN (HCC): ICD-10-CM

## 2020-10-19 DIAGNOSIS — R10.11 RUQ PAIN: ICD-10-CM

## 2020-10-19 LAB
A/G RATIO: 2 (ref 1.1–2.2)
ALBUMIN SERPL-MCNC: 4.7 G/DL (ref 3.4–5)
ALP BLD-CCNC: 79 U/L (ref 40–129)
ALT SERPL-CCNC: 20 U/L (ref 10–40)
ANION GAP SERPL CALCULATED.3IONS-SCNC: 14 MMOL/L (ref 3–16)
AST SERPL-CCNC: 20 U/L (ref 15–37)
BASOPHILS ABSOLUTE: 0 K/UL (ref 0–0.2)
BASOPHILS RELATIVE PERCENT: 0.4 %
BILIRUB SERPL-MCNC: 0.7 MG/DL (ref 0–1)
BILIRUBIN, POC: 1.02
BLOOD URINE, POC: NORMAL
BUN BLDV-MCNC: 10 MG/DL (ref 7–20)
CALCIUM SERPL-MCNC: 10.1 MG/DL (ref 8.3–10.6)
CHLORIDE BLD-SCNC: 103 MMOL/L (ref 99–110)
CHOLESTEROL, TOTAL: 172 MG/DL (ref 0–199)
CLARITY, POC: NORMAL
CO2: 26 MMOL/L (ref 21–32)
COLOR, POC: NORMAL
CREAT SERPL-MCNC: 0.8 MG/DL (ref 0.9–1.3)
EOSINOPHILS ABSOLUTE: 0.2 K/UL (ref 0–0.6)
EOSINOPHILS RELATIVE PERCENT: 2.4 %
GFR AFRICAN AMERICAN: >60
GFR NON-AFRICAN AMERICAN: >60
GLOBULIN: 2.3 G/DL
GLUCOSE BLD-MCNC: 147 MG/DL (ref 70–99)
GLUCOSE URINE, POC: NORMAL
HCT VFR BLD CALC: 49.1 % (ref 40.5–52.5)
HDLC SERPL-MCNC: 38 MG/DL (ref 40–60)
HEMOGLOBIN: 16.6 G/DL (ref 13.5–17.5)
KETONES, POC: NORMAL
LDL CHOLESTEROL CALCULATED: 109 MG/DL
LEUKOCYTE EST, POC: NORMAL
LIPASE: 27 U/L (ref 13–60)
LYMPHOCYTES ABSOLUTE: 3.8 K/UL (ref 1–5.1)
LYMPHOCYTES RELATIVE PERCENT: 37.9 %
MCH RBC QN AUTO: 32.6 PG (ref 26–34)
MCHC RBC AUTO-ENTMCNC: 33.8 G/DL (ref 31–36)
MCV RBC AUTO: 96.4 FL (ref 80–100)
MONOCYTES ABSOLUTE: 1 K/UL (ref 0–1.3)
MONOCYTES RELATIVE PERCENT: 10.3 %
NEUTROPHILS ABSOLUTE: 4.9 K/UL (ref 1.7–7.7)
NEUTROPHILS RELATIVE PERCENT: 49 %
NITRITE, POC: NORMAL
PDW BLD-RTO: 13.5 % (ref 12.4–15.4)
PH, POC: 5.5
PLATELET # BLD: 128 K/UL (ref 135–450)
PLATELET SLIDE REVIEW: ABNORMAL
PMV BLD AUTO: 11.2 FL (ref 5–10.5)
POTASSIUM SERPL-SCNC: 4.6 MMOL/L (ref 3.5–5.1)
PROTEIN, POC: NORMAL
RBC # BLD: 5.09 M/UL (ref 4.2–5.9)
SLIDE REVIEW: ABNORMAL
SODIUM BLD-SCNC: 143 MMOL/L (ref 136–145)
SPECIFIC GRAVITY, POC: 1.02
TOTAL PROTEIN: 7 G/DL (ref 6.4–8.2)
TRIGL SERPL-MCNC: 124 MG/DL (ref 0–150)
UROBILINOGEN, POC: 0.2
VLDLC SERPL CALC-MCNC: 25 MG/DL
WBC # BLD: 10 K/UL (ref 4–11)

## 2020-10-19 PROCEDURE — G8417 CALC BMI ABV UP PARAM F/U: HCPCS | Performed by: INTERNAL MEDICINE

## 2020-10-19 PROCEDURE — 4004F PT TOBACCO SCREEN RCVD TLK: CPT | Performed by: INTERNAL MEDICINE

## 2020-10-19 PROCEDURE — G8427 DOCREV CUR MEDS BY ELIG CLIN: HCPCS | Performed by: INTERNAL MEDICINE

## 2020-10-19 PROCEDURE — 3051F HG A1C>EQUAL 7.0%<8.0%: CPT | Performed by: INTERNAL MEDICINE

## 2020-10-19 PROCEDURE — 99214 OFFICE O/P EST MOD 30 MIN: CPT | Performed by: INTERNAL MEDICINE

## 2020-10-19 PROCEDURE — 2022F DILAT RTA XM EVC RTNOPTHY: CPT | Performed by: INTERNAL MEDICINE

## 2020-10-19 PROCEDURE — 81002 URINALYSIS NONAUTO W/O SCOPE: CPT | Performed by: INTERNAL MEDICINE

## 2020-10-19 PROCEDURE — 3017F COLORECTAL CA SCREEN DOC REV: CPT | Performed by: INTERNAL MEDICINE

## 2020-10-19 PROCEDURE — G8482 FLU IMMUNIZE ORDER/ADMIN: HCPCS | Performed by: INTERNAL MEDICINE

## 2020-10-19 RX ORDER — CIPROFLOXACIN 500 MG/1
500 TABLET, FILM COATED ORAL 2 TIMES DAILY
Qty: 20 TABLET | Refills: 0 | Status: SHIPPED | OUTPATIENT
Start: 2020-10-19 | End: 2020-10-29

## 2020-10-19 RX ORDER — DICYCLOMINE HYDROCHLORIDE 10 MG/1
10 CAPSULE ORAL 4 TIMES DAILY
Qty: 40 CAPSULE | Refills: 0 | Status: SHIPPED | OUTPATIENT
Start: 2020-10-19 | End: 2021-09-30

## 2020-10-19 NOTE — PATIENT INSTRUCTIONS
(Aleve). These can cause stomach upset. Talk to your doctor if you take daily aspirin for another health problem. When should you call for help? Call 911 anytime you think you may need emergency care. For example, call if:    · You passed out (lost consciousness).     · You pass maroon or very bloody stools.     · You vomit blood or what looks like coffee grounds.     · You have new, severe belly pain. Call your doctor now or seek immediate medical care if:    · Your pain gets worse, especially if it becomes focused in one area of your belly.     · You have a new or higher fever.     · Your stools are black and look like tar, or they have streaks of blood.     · You have unexpected vaginal bleeding.     · You have symptoms of a urinary tract infection. These may include:  ? Pain when you urinate. ? Urinating more often than usual.  ? Blood in your urine.     · You are dizzy or lightheaded, or you feel like you may faint. Watch closely for changes in your health, and be sure to contact your doctor if:    · You are not getting better after 1 day (24 hours). Where can you learn more? Go to https://Playerize.[employer]TickTickets. org and sign in to your [employer] account. Enter U445 in the SpineForm box to learn more about \"Abdominal Pain: Care Instructions. \"     If you do not have an account, please click on the \"Sign Up Now\" link. Current as of: June 26, 2019               Content Version: 12.6  © 3257-7154 [employer]. Care instructions adapted under license by [facility] ([facility]). If you have questions about a medical condition or this instruction, always ask your healthcare professional. [place] [address] any warranty or liability for your use of this information.

## 2020-10-19 NOTE — PROGRESS NOTES
SUBJECTIVE:  Marianela Norton is a 48 y.o. male being evaluated for:    Chief Complaint   Patient presents with    Abdominal Pain     right side mid abd/flank pain/swelling x 5 days. did have dark colored urine, better now. strong odor. hurts more when laying on left side.     Lower Back Pain     b/l low back pain x 3 weeks, radiated to abd x 5 days       HPI   For 5 days pain in right middle stomach  Worse if eats to much but no food in particular  No heart burn or indigestion  NO fevers or chills   NO nausea or vomiting  No diarrhea or constipation  Sugars has not been checking  Has quit his Mt Dew     Allergies   Allergen Reactions    Lyrica [Pregabalin] Other (See Comments)     \"made him black out\"    Wellbutrin [Bupropion] Other (See Comments)     Made him viloent     Current Outpatient Medications   Medication Sig Dispense Refill    dicyclomine (BENTYL) 10 MG capsule Take 1 capsule by mouth 4 times daily 40 capsule 0    ciprofloxacin (CIPRO) 500 MG tablet Take 1 tablet by mouth 2 times daily for 10 days 20 tablet 0    albuterol sulfate HFA (VENTOLIN HFA) 108 (90 Base) MCG/ACT inhaler Inhale 2 puffs into the lungs 4 times daily as needed for Wheezing 1 Inhaler 5    tadalafil (CIALIS) 20 MG tablet Take 1 tablet by mouth daily as needed for Erectile Dysfunction 10 tablet 5    metFORMIN (GLUCOPHAGE) 500 MG tablet TAKE 1 TABLET BY MOUTH DAILY WITH BREAKFAST 30 tablet 3    rOPINIRole (REQUIP) 1 MG tablet Take 1 tablet by mouth nightly 30 tablet 3    DULoxetine (CYMBALTA) 60 MG extended release capsule Take 1 capsule by mouth once daily 30 capsule 5    blood glucose test strips (ASCENSIA AUTODISC VI;ONE TOUCH ULTRA TEST VI) strip Test Sugar Once Daily at Alternating Times of Day, DX: E11.9 100 each 3    atorvastatin (LIPITOR) 20 MG tablet Take 1 tablet by mouth nightly 90 tablet 1    B Complex Vitamins (B-COMPLEX/B-12 PO) Take by mouth daily      Cholecalciferol (VITAMIN D3 PO) Take by mouth daily      Omega-3 Fatty Acids (FISH OIL PO) Take by mouth daily      Multiple Vitamins-Minerals (MENS MULTIVITAMIN PO) Take by mouth daily      Ascorbic Acid (VITAMIN C PO) Take by mouth daily      POTASSIUM PO Take by mouth daily      gabapentin (NEURONTIN) 600 MG tablet Take 600 mg by mouth 3 times daily.  aspirin EC 81 MG EC tablet Take 1 tablet by mouth daily 30 tablet 5    oxyCODONE HCl (OXY-IR) 10 MG immediate release tablet Take 10 mg by mouth 4 times daily.  tiZANidine (ZANAFLEX) 4 MG tablet Take 1 tablet by mouth 3 times daily 90 tablet 5    ONETOUCH DELICA LANCETS 72A MISC Test Sugar Once Daily at Alternating Times of Day, DX: E11.9 100 each 3    Capsaicin 0.075 % STCK Topically daily (Patient not taking: Reported on 10/19/2020) 1 Stick 5    fluticasone-salmeterol (WIXELA INHUB) 500-50 MCG/DOSE diskus inhaler Inhale 1 puff into the lungs 2 times daily 60 each 3     No current facility-administered medications for this visit.           Social History     Socioeconomic History    Marital status:      Spouse name: Not on file    Number of children: Not on file    Years of education: Not on file    Highest education level: Not on file   Occupational History    Not on file   Social Needs    Financial resource strain: Not on file    Food insecurity     Worry: Not on file     Inability: Not on file    Transportation needs     Medical: Not on file     Non-medical: Not on file   Tobacco Use    Smoking status: Current Every Day Smoker     Packs/day: 1.50     Years: 31.00     Pack years: 46.50     Types: Cigarettes    Smokeless tobacco: Never Used    Tobacco comment: 1.50 PPD   Substance and Sexual Activity    Alcohol use: Yes     Comment: social    Drug use: No    Sexual activity: Yes     Partners: Female   Lifestyle    Physical activity     Days per week: Not on file     Minutes per session: Not on file    Stress: Not on file   Relationships    Social connections     Talks on phone: Not on file     Gets together: Not on file     Attends Buddhism service: Not on file     Active member of club or organization: Not on file     Attends meetings of clubs or organizations: Not on file     Relationship status: Not on file    Intimate partner violence     Fear of current or ex partner: Not on file     Emotionally abused: Not on file     Physically abused: Not on file     Forced sexual activity: Not on file   Other Topics Concern    Not on file   Social History Narrative    Not on file      Past Medical History:   Diagnosis Date    [facility]     Carpal tunnel syndrome, bilateral     Cervical disc disease     COPD (chronic obstructive pulmonary disease) [facility]     mild but still smoking    Depression     Lumbar disc disease     Neck pain     Osteoarthritis     [facility]     Past Surgical History:   Procedure Laterality Date    BLADDER TUMOR EXCISION      CARPAL TUNNEL RELEASE Bilateral 06/2015    CERVICAL DISCECTOMY  11/18/2016    CHOLECYSTECTOMY      KNEE SURGERY Right     TONSILLECTOMY         Review of Systems   Constitutional: Negative for chills, fatigue and fever. HENT: Negative for nosebleeds. Eyes: Negative for visual disturbance. Respiratory: Negative for cough and shortness of breath. Cardiovascular: Negative for chest pain, palpitations and leg swelling. Gastrointestinal: Positive for abdominal pain. Negative for blood in stool, diarrhea, nausea and vomiting. Genitourinary: Negative for difficulty urinating, dysuria and frequency. Musculoskeletal: Positive for arthralgias and back pain. Neurological: Negative for dizziness, light-headedness and headaches. OBJECTIVE:  /70   Pulse 70   Temp 97.3 °F (36.3 °C) (Temporal)   Ht 5' 10.5\" (1.791 m)   Wt 225 lb 6.4 oz (102.2 kg)   SpO2 97%   BMI 31.88 kg/m²      Body mass index is 31.88 kg/m².      Physical Exam  Constitutional:       General: He is not in acute distress. Appearance: Normal appearance. He is well-developed. HENT:      Head: Normocephalic and atraumatic. Right Ear: External ear normal.      Left Ear: External ear normal.      Nose: No congestion. Mouth/Throat:      Pharynx: Oropharynx is clear. Eyes:      Conjunctiva/sclera: Conjunctivae normal.   Neck:      Musculoskeletal: Neck supple. Thyroid: No thyromegaly. Vascular: No carotid bruit or JVD. Cardiovascular:      Rate and Rhythm: Normal rate and regular rhythm. Heart sounds: Normal heart sounds. No murmur. No friction rub. No gallop. Comments:    Pulmonary:      Effort: Pulmonary effort is normal. No respiratory distress. Breath sounds: No wheezing. Chest:      Chest wall: No tenderness. Abdominal:      General: There is no distension. Palpations: Abdomen is soft. Tenderness: There is abdominal tenderness (ruq). There is no guarding or rebound. Comments: No hsm   Musculoskeletal:         General: No swelling. Lymphadenopathy:      Cervical: No cervical adenopathy. Neurological:      General: No focal deficit present. Mental Status: He is alert. Gait: Gait normal.         ASSESSMENT/PLAN:    Yousif Vega was seen today for abdominal pain and lower back pain. Diagnoses and all orders for this visit:    RUQ pain  -     CBC Auto Differential; Future  -     Comprehensive Metabolic Panel; Future  -     Lipase; Future  -     POCT Urinalysis no Micro  -     dicyclomine (BENTYL) 10 MG capsule; Take 1 capsule by mouth 4 times daily  -     ciprofloxacin (CIPRO) 500 MG tablet; Take 1 tablet by mouth 2 times daily for 10 days    Type 2 diabetes mellitus without complication, with long-term current use of insulin (HCC)  -     Hemoglobin A1C; Future    Hypercholesteremia  -     Lipid Panel;  Future      If worsens to ed if not improving imaging ct scan         Orders Placed This Encounter   Medications    dicyclomine (BENTYL) 10 MG capsule     Sig: Take 1 capsule by mouth 4 times daily     Dispense:  40 capsule     Refill:  0    ciprofloxacin (CIPRO) 500 MG tablet     Sig: Take 1 tablet by mouth 2 times daily for 10 days     Dispense:  20 tablet     Refill:  0        No follow-ups on file. Patient Instructions       Patient Education        Abdominal Pain: Care Instructions  Your Care Instructions     Abdominal pain has many possible causes. Some aren't serious and get better on their own in a few days. Others need more testing and treatment. If your pain continues or gets worse, you need to be rechecked and may need more tests to find out what is wrong. You may need surgery to correct the problem. Don't ignore new symptoms, such as fever, nausea and vomiting, urination problems, pain that gets worse, and dizziness. These may be signs of a more serious problem. Your doctor may have recommended a follow-up visit in the next 8 to 12 hours. If you are not getting better, you may need more tests or treatment. The doctor has checked you carefully, but problems can develop later. If you notice any problems or new symptoms, get medical treatment right away. Follow-up care is a key part of your treatment and safety. Be sure to make and go to all appointments, and call your doctor if you are having problems. It's also a good idea to know your test results and keep a list of the medicines you take. How can you care for yourself at home? · Rest until you feel better. · To prevent dehydration, drink plenty of fluids, enough so that your urine is light yellow or clear like water. Choose water and other caffeine-free clear liquids until you feel better. If you have kidney, heart, or liver disease and have to limit fluids, talk with your doctor before you increase the amount of fluids you drink. · If your stomach is upset, eat mild foods, such as rice, dry toast or crackers, bananas, and applesauce.  Try eating several small meals instead of two or three large ones. · Wait until 48 hours after all symptoms have gone away before you have spicy foods, alcohol, and drinks that contain caffeine. · Do not eat foods that are high in fat. · Avoid anti-inflammatory medicines such as aspirin, ibuprofen (Advil, Motrin), and naproxen (Aleve). These can cause stomach upset. Talk to your doctor if you take daily aspirin for another health problem. When should you call for help? Call 911 anytime you think you may need emergency care. For example, call if:    · You passed out (lost consciousness).     · You pass maroon or very bloody stools.     · You vomit blood or what looks like coffee grounds.     · You have new, severe belly pain. Call your doctor now or seek immediate medical care if:    · Your pain gets worse, especially if it becomes focused in one area of your belly.     · You have a new or higher fever.     · Your stools are black and look like tar, or they have streaks of blood.     · You have unexpected vaginal bleeding.     · You have symptoms of a urinary tract infection. These may include:  ? Pain when you urinate. ? Urinating more often than usual.  ? Blood in your urine.     · You are dizzy or lightheaded, or you feel like you may faint. Watch closely for changes in your health, and be sure to contact your doctor if:    · You are not getting better after 1 day (24 hours). Where can you learn more? Go to https://Funinhand.[employer]. org and sign in to your [employer] account. Enter V669 in the [employer] box to learn more about \"Abdominal Pain: Care Instructions. \"     If you do not have an account, please click on the \"Sign Up Now\" link. Current as of: June 26, 2019               Content Version: 12.6  © 2023-0931 [employer]. Care instructions adapted under license by [facility] [employer] [facility] ([facility]).  If you have questions about a medical condition or this instruction, always ask your healthcare professional. [employer] disclaims any warranty or liability for your use of this information.

## 2020-10-20 LAB
ESTIMATED AVERAGE GLUCOSE: 168.6 MG/DL
HBA1C MFR BLD: 7.5 %

## 2020-10-26 ASSESSMENT — ENCOUNTER SYMPTOMS
NAUSEA: 0
ABDOMINAL PAIN: 1
BACK PAIN: 1
VOMITING: 0
SHORTNESS OF BREATH: 0
DIARRHEA: 0
BLOOD IN STOOL: 0
COUGH: 0

## 2020-10-29 ENCOUNTER — OFFICE VISIT (OUTPATIENT)
Dept: SLEEP MEDICINE | Age: 50
End: 2020-10-29
Payer: COMMERCIAL

## 2020-10-29 VITALS
HEART RATE: 71 BPM | DIASTOLIC BLOOD PRESSURE: 76 MMHG | WEIGHT: 223 LBS | HEIGHT: 71 IN | OXYGEN SATURATION: 95 % | SYSTOLIC BLOOD PRESSURE: 124 MMHG | TEMPERATURE: 98.5 F | BODY MASS INDEX: 31.22 KG/M2 | RESPIRATION RATE: 18 BRPM

## 2020-10-29 PROCEDURE — 4004F PT TOBACCO SCREEN RCVD TLK: CPT | Performed by: PSYCHIATRY & NEUROLOGY

## 2020-10-29 PROCEDURE — G8427 DOCREV CUR MEDS BY ELIG CLIN: HCPCS | Performed by: PSYCHIATRY & NEUROLOGY

## 2020-10-29 PROCEDURE — 3017F COLORECTAL CA SCREEN DOC REV: CPT | Performed by: PSYCHIATRY & NEUROLOGY

## 2020-10-29 PROCEDURE — G8482 FLU IMMUNIZE ORDER/ADMIN: HCPCS | Performed by: PSYCHIATRY & NEUROLOGY

## 2020-10-29 PROCEDURE — G8926 SPIRO NO PERF OR DOC: HCPCS | Performed by: PSYCHIATRY & NEUROLOGY

## 2020-10-29 PROCEDURE — 3023F SPIROM DOC REV: CPT | Performed by: PSYCHIATRY & NEUROLOGY

## 2020-10-29 PROCEDURE — G8417 CALC BMI ABV UP PARAM F/U: HCPCS | Performed by: PSYCHIATRY & NEUROLOGY

## 2020-10-29 PROCEDURE — 99204 OFFICE O/P NEW MOD 45 MIN: CPT | Performed by: PSYCHIATRY & NEUROLOGY

## 2020-10-29 ASSESSMENT — SLEEP AND FATIGUE QUESTIONNAIRES
HOW LIKELY ARE YOU TO NOD OFF OR FALL ASLEEP WHILE LYING DOWN TO REST IN THE AFTERNOON WHEN CIRCUMSTANCES PERMIT: 3
HOW LIKELY ARE YOU TO NOD OFF OR FALL ASLEEP IN A CAR, WHILE STOPPED FOR A FEW MINUTES IN TRAFFIC: 0
HOW LIKELY ARE YOU TO NOD OFF OR FALL ASLEEP WHILE SITTING INACTIVE IN A PUBLIC PLACE: 2
HOW LIKELY ARE YOU TO NOD OFF OR FALL ASLEEP WHILE SITTING AND TALKING TO SOMEONE: 1
ESS TOTAL SCORE: 14
HOW LIKELY ARE YOU TO NOD OFF OR FALL ASLEEP WHILE SITTING AND READING: 2
HOW LIKELY ARE YOU TO NOD OFF OR FALL ASLEEP WHEN YOU ARE A PASSENGER IN A CAR FOR AN HOUR WITHOUT A BREAK: 2
HOW LIKELY ARE YOU TO NOD OFF OR FALL ASLEEP WHILE SITTING QUIETLY AFTER LUNCH WITHOUT ALCOHOL: 3
NECK CIRCUMFERENCE (INCHES): 17.5
HOW LIKELY ARE YOU TO NOD OFF OR FALL ASLEEP WHILE WATCHING TV: 1

## 2020-10-29 ASSESSMENT — ENCOUNTER SYMPTOMS
ALLERGIC/IMMUNOLOGIC NEGATIVE: 1
EYES NEGATIVE: 1
APNEA: 1
WHEEZING: 1
ABDOMINAL PAIN: 1
SORE THROAT: 1
CHOKING: 1
BACK PAIN: 1

## 2020-10-29 NOTE — PATIENT INSTRUCTIONS
Orders Placed This Encounter   Procedures    Baseline Diagnostic Sleep Study     Standing Status:   Future     Standing Expiration Date:   10/29/2021     Order Specific Question:   Adult or Pediatric     Answer:   Adult Study (>7 Years)     Order Specific Question:   Location For Sleep Study     Answer:   [place]     Order Specific Question:   Select Sleep Lab Location     Answer:   [facility]    Sleep Study with PAP Titration     Standing Status:   Future     Standing Expiration Date:   10/29/2021     Order Specific Question:   Sleep Study Titration Type     Answer:   CPAP     Order Specific Question:   Location For Sleep Study     Answer:   [place]     Order Specific Question:   Select Sleep Lab Location     Answer:   [facility]        Patient Education        Sleep Apnea: Care Instructions  Your Care Instructions     Sleep apnea means that you frequently stop breathing for 10 seconds or longer during sleep. It can be mild to severe, based on the number of times an hour that you stop breathing or have slowed breathing. Blocked or narrowed airways in your nose, mouth, or throat can cause sleep apnea. Your airway can become blocked when your throat muscles and tongue relax during sleep. You can treat sleep apnea at home by making lifestyle changes. You also can use a CPAP breathing machine that keeps tissues in the throat from blocking your airway. Or your doctor may suggest that you use a breathing device while you sleep. It helps keep your airway open. This could be a device that you put in your mouth. In some cases, surgery may be needed to remove enlarged tissues in the throat. Follow-up care is a key part of your treatment and safety. Be sure to make and go to all appointments, and call your doctor if you are having problems. It's also a good idea to know your test results and keep a list of the medicines you take. How can you care for yourself at home? · Lose weight, if needed.  It may reduce the number of times you stop breathing or have slowed breathing. · Sleep on your side. It may stop mild apnea. If you tend to roll onto your back, sew a pocket in the back of your pajama top. Put a tennis ball into the pocket, and stitch the pocket shut. This will help keep you from sleeping on your back. · Avoid alcohol and medicines such as sleeping pills and sedatives before bed. · Do not smoke. Smoking can make sleep apnea worse. If you need help quitting, talk to your doctor about stop-smoking programs and medicines. These can increase your chances of quitting for good. · Prop up the head of your bed 4 to 6 inches by putting bricks under the legs of the bed. · Treat breathing problems, such as a stuffy nose, caused by a cold or allergies. · Try a continuous positive airway pressure (CPAP) breathing machine if your doctor recommends it. The machine keeps your airway open when you sleep. · If CPAP does not work for you, ask your doctor if you can try other breathing machines. A bilevel positive airway pressure machine uses one type of air pressure for breathing in and another type for breathing out. Another device raises or lowers air pressure as needed while you breathe. · Talk to your doctor if:  ? Your nose feels dry or bleeds when you use one of these machines. You may need to increase moisture in the air. A humidifier may help. ? Your nose is runny or stuffy from using a breathing machine. Decongestants or a corticosteroid nasal spray may help. ? You are sleepy during the day and it gets in the way of the normal things you do. Do not drive when you are drowsy. When should you call for help? Watch closely for changes in your health, and be sure to contact your doctor if:    · You still have sleep apnea even though you have made lifestyle changes.     · You are thinking of trying a device such as CPAP.     · You are having problems using a CPAP or similar machine.    Where can you learn more?  Go to https://chpepiceweb.healthSuksh [email or web address] org and sign in to your [employer] account. Enter G371 in the [facility] box to learn more about \"Sleep Apnea: Care Instructions. \"     If you do not have an account, please click on the \"Sign Up Now\" link. Current as of: February 24, 2020               Content Version: 12.6  © 3317-4614 [employer], [facility]. Care instructions adapted under license by [facility] ([facility]). If you have questions about a medical condition or this instruction, always ask your healthcare professional. [address] any warranty or liability for your use of this information.

## 2020-10-29 NOTE — PROGRESS NOTES
MD NICOLE Brown Board Certified in Sleep Medicine  Certified [facility] Sleep Medicine  Board Certified in Neurology [address]  [address] [facility] [address] [place] [address] ([address] [facility], [address]           [address]  [address] [facility] [address] [phone]    Subjective:     Patient ID: Leopoldo Avers is a 48 y.o. male. Chief Complaint   Patient presents with    Sleep Apnea     np mateo       HPI:        Leopoldo Avers is a 48 y.o. male referred by Dr Kisha Perea for a sleep evaluation. He complains of snoring, snorting, choking, periods of not breathing, tossing and turning, kicking, excessive daytime sleepiness, feels sleepy during the day, take naps during the day but he denies knees buckling with laughing, completely or partially paralyzed while falling asleep or waking up, noisy environment, uncomfortable room temperature, uncomfortable bedding. Symptoms began several years ago, gradually worsening since that time. The patient's bed-partner confirmed the snoring and stopped breathing at night  SLEEP SCHEDULE: Goes to bed around 1 AM in the weekdays and 2 AM in the weekends. It usually takes the patient 5-60 minutes to fall asleep. The patient gets up 1-3 per night to go to the bathroom. The Patient finally gets up at 10 AM-12 noon during the weekdays and 1 AM in the weekends. patient wakes up with dry mouth. The patient has restless sleep with frequent arousals in addition to the Patient has significant daytime sleepiness. The Patient scored Total score: 14 on [place] Sleepiness Scale ( more than 10 is indicative of daytime sleepiness)and 60 in fatigue scale ( more than 36 is indicative of daytime fatigue). The patient takes 1-3 naps/day for  minutes and usually is not refreshing nap.      Previous evaluation and Attends meetings of clubs or organizations: Not on file     Relationship status: Not on file    Intimate partner violence     Fear of current or ex partner: Not on file     Emotionally abused: Not on file     Physically abused: Not on file     Forced sexual activity: Not on file   Other Topics Concern    Not on file   Social History Narrative    Not on file       Prior to Admission medications    Medication Sig Start Date End Date Taking?  Authorizing Provider   dicyclomine (BENTYL) 10 MG capsule Take 1 capsule by mouth 4 times daily 10/19/20  Yes Hiral Nova MD   ciprofloxacin (CIPRO) 500 MG tablet Take 1 tablet by mouth 2 times daily for 10 days 10/19/20 10/29/20 Yes Hiral Nova MD   albuterol sulfate HFA (VENTOLIN HFA) 108 (90 Base) MCG/ACT inhaler Inhale 2 puffs into the lungs 4 times daily as needed for Wheezing 9/10/20  Yes Hiral Nova MD   tadalafil (CIALIS) 20 MG tablet Take 1 tablet by mouth daily as needed for Erectile Dysfunction 9/10/20  Yes Hiral Nova MD   fluticasone-salmeterol [facility]) 500-50 MCG/DOSE diskus inhaler Inhale 1 puff into the lungs 2 times daily 9/10/20  Yes Hiral Nova MD   metFORMIN (GLUCOPHAGE) 500 MG tablet TAKE 1 TABLET BY MOUTH DAILY WITH BREAKFAST 9/8/20  Yes Hiral Nova MD   rOPINIRole (REQUIP) 1 MG tablet Take 1 tablet by mouth nightly 8/19/20  Yes Hiral Nova MD   DULoxetine (CYMBALTA) 60 MG extended release capsule Take 1 capsule by mouth once daily 7/2/20  Yes Hiral Nova MD   blood glucose test strips (ASCENSIA AUTODISC VI;ONE TOUCH ULTRA TEST VI) strip Test Sugar Once Daily at Alternating Times of Day, DX: E11.9 6/9/20  Yes Hrial Nova MD   atorvastatin (LIPITOR) 20 MG tablet Take 1 tablet by mouth nightly 4/8/20  Yes Hiral Nova MD   B Complex Vitamins (B-COMPLEX/B-12 PO) Take by mouth daily   Yes Historical Provider, MD   Cholecalciferol (VITAMIN D3 PO) Take by mouth daily   Yes Historical Provider, MD   Omega-3 Fatty Acids (FISH OIL PO) Take by mouth daily   Yes Historical Provider, MD   Multiple Vitamins-Minerals (MENS MULTIVITAMIN PO) Take by mouth daily   Yes Historical Provider, MD   Ascorbic Acid (VITAMIN C PO) Take by mouth daily   Yes Historical Provider, MD   POTASSIUM PO Take by mouth daily   Yes Historical Provider, MD   gabapentin (NEURONTIN) 600 MG tablet Take 600 mg by mouth 3 times daily. Yes Historical Provider, MD   aspirin EC 81 MG EC tablet Take 1 tablet by mouth daily 10/8/19  Yes Connie Rendon MD   oxyCODONE HCl (OXY-IR) 10 MG immediate release tablet Take 10 mg by mouth 4 times daily.   11/29/18  Yes Historical Provider, MD   tiZANidine (ZANAFLEX) 4 MG tablet Take 1 tablet by mouth 3 times daily 10/11/18  Yes Connie Rendon MD   [facility] 22O MISC Test Sugar Once Daily at Alternating Times of Day, DX: E11.9 9/26/18  Yes Connie Rendon MD   Capsaicin 0.075 % STCK Topically daily  Patient not taking: Reported on 10/19/2020 9/10/20   Connie Rendon MD       Allergies as of 10/29/2020 - Review Complete 10/29/2020   Allergen Reaction Noted    Lyrica [pregabalin] Other (See Comments) 12/06/2018    Wellbutrin [bupropion] Other (See Comments) 11/09/2016       Patient Active Problem List   Diagnosis    Malignant neoplasm of dome of urinary bladder ([address]    Disc disease with myelopathy, cervical    Post-traumatic osteoarthritis of knee    Chronic obstructive pulmonary disease ([address]    Chronic pain syndrome    Major depressive disorder with single episode, in partial remission ([address]    Smoker    Prepatellar bursitis of right knee    Congenital anomaly of larynx    Neuropathy    Hypercholesteremia    Cervical post-laminectomy syndrome    Lumbar spondylosis    Type 2 diabetes mellitus without complication, with long-term current use of insulin ([address]    Mixed hyperlipidemia       Past Medical History:   Diagnosis Date    Bladder carcinoma ([address]     Carpal tunnel syndrome, bilateral     Cervical disc disease     COPD (chronic obstructive pulmonary disease) (HCC)     mild but still smoking    Depression     Lumbar disc disease     Neck pain     Osteoarthritis     Type 2 diabetes mellitus, controlled ([facility] [address] 09/04/2018       Past Surgical History:   Procedure Laterality Date    BLADDER TUMOR EXCISION      CARPAL TUNNEL RELEASE Bilateral 06/2015    CERVICAL DISCECTOMY  11/18/2016    CHOLECYSTECTOMY      KNEE SURGERY Right     TONSILLECTOMY         Family History   Problem Relation Age of Onset    COPD Mother     Depression Father         manic    Coronary Art Dis Maternal Grandfather     Cancer Maternal Grandfather         skin cancer     Diabetes Paternal Grandmother     Cancer Maternal Uncle         does not know the type     No Known Problems Paternal Grandfather        Review of Systems   Constitutional: Positive for diaphoresis and fatigue. HENT: Positive for sore throat. Eyes: Negative. Respiratory: Positive for apnea, choking and wheezing. Cardiovascular: Negative. Gastrointestinal: Positive for abdominal pain. Endocrine: Negative. Genitourinary: Positive for frequency. Musculoskeletal: Positive for arthralgias, back pain, myalgias and neck pain. Skin: Negative. Allergic/Immunologic: Negative. Neurological: Negative for headaches. Hematological: Negative. Psychiatric/Behavioral: Positive for dysphoric mood. Objective:     Vitals:  Weight BMI Neck circumference    Wt Readings from Last 3 Encounters:   10/29/20 223 lb (101.2 kg)   10/19/20 225 lb 6.4 oz (102.2 kg)   09/10/20 226 lb (102.5 kg)    Body mass index is 31.1 kg/m².  Neck circumference: 17.5     BP HR SaO2   BP Readings from Last 3 Encounters:   10/29/20 124/76   10/19/20 116/70   09/10/20 124/82    Pulse Readings from Last 3 Encounters:   10/29/20 71   10/19/20 70   09/10/20 64    SpO2 Readings from Last 3 Encounters:   10/29/20 95%   10/19/20 97%   09/10/20 96% The mandibular molar Class :   []1 []2 []3      Mallampati I Airway Classification:   []1 []2 []3 []4        Physical Exam  Vitals signs and nursing note reviewed. Constitutional:       Appearance: Normal appearance. HENT:      Head: Atraumatic. Nose: Nose normal.      Mouth/Throat:      Mouth: Mucous membranes are dry. Comments: Mallampati class 2, no retrognathia or hypognathia , normal airflow in bilateral nostrils, no septum deviation , crowded oropharynx with low soft palate, high arched hard palate,no tonsils enlargement. Eyes:      Extraocular Movements: Extraocular movements intact. Neck:      Musculoskeletal: Normal range of motion and neck supple. Cardiovascular:      Rate and Rhythm: Normal rate and regular rhythm. Heart sounds: Normal heart sounds. Pulmonary:      Effort: Pulmonary effort is normal.      Breath sounds: Normal breath sounds. Musculoskeletal: Normal range of motion. Skin:     General: Skin is warm. Neurological:      General: No focal deficit present. Psychiatric:         Mood and Affect: Mood normal.         Assessment:   Obstructive sleep apnea especially with snoring, snorting,  observed apnea, daytime sleepiness, large neck circumference, Mallampati class of 2 and obesity. On Oxycodone 10 QID ( on chronic opioids and can not be weaned off)   Zanaflex 4 mg TID  Neurontin 600 TID       The Requip helps the RLS. Diagnosis Orders   1. Obstructive sleep apnea  Baseline Diagnostic Sleep Study    Sleep Study with PAP Titration   2. Chronic obstructive pulmonary disease, unspecified COPD type ([facility]  Baseline Diagnostic Sleep Study    Sleep Study with PAP Titration   3. Chronic, continuous use of opioids  Baseline Diagnostic Sleep Study    Sleep Study with PAP Titration     Plan:     Patient was counseled about the pathophysiology of obstructive sleep apnea syndrome and the methods for evaluating its presence and severity.   Patient was counseled to avoid driving and other potentially hazardous circumstances if the patient is experiencing excessive sleepiness. Treatment considerations include the use of nasal CPAP, oral dental appliance or a surgical intervention, which should be based on otolarygologic findings, In the meantime, the patient should be cautioned to avoid the use of alcohol or other depressant medications because of potential for increasing the duration and severity of apnea and cautioned regarding driving or operating and dangerous equipment if the patient is experiencing daytime sleepiness. .    Most likely treating the FAROOQ will have position impact on COPD control. Orders Placed This Encounter   Procedures    Baseline Diagnostic Sleep Study    Sleep Study with PAP Titration       Return in about 3 months (around 1/29/2021) for to review the PSG and CPAP usage, Reveiwing CPAP usage and compliance report and tro.     Adry Burkett MD  Medical Director - [facility]

## 2020-11-04 ENCOUNTER — TELEPHONE (OUTPATIENT)
Dept: FAMILY MEDICINE CLINIC | Age: 50
End: 2020-11-04

## 2020-11-04 NOTE — TELEPHONE ENCOUNTER
Patient calling he states the medicine you gave him on 10/29/20 did not help at all, he states he is still having right sided abdominal pain and it is worse now. He is asking what can he do?     Please call, [phone]

## 2020-11-06 ENCOUNTER — OFFICE VISIT (OUTPATIENT)
Dept: PRIMARY CARE CLINIC | Age: 50
End: 2020-11-06
Payer: COMMERCIAL

## 2020-11-06 PROCEDURE — 99211 OFF/OP EST MAY X REQ PHY/QHP: CPT | Performed by: NURSE PRACTITIONER

## 2020-11-06 NOTE — PROGRESS NOTES
Patient presented to [facility] drive up clinic for preop testing. Patient was swabbed and given information advising them to remain isolated until procedure date.

## 2020-11-07 LAB — SARS-COV-2: NOT DETECTED

## 2020-11-12 ENCOUNTER — HOSPITAL ENCOUNTER (OUTPATIENT)
Dept: SLEEP CENTER | Age: 50
Discharge: HOME | End: 2020-11-12
Payer: COMMERCIAL

## 2020-11-12 PROCEDURE — 95810 POLYSOM 6/> YRS 4/> PARAM: CPT | Performed by: PSYCHIATRY & NEUROLOGY

## 2020-11-12 PROCEDURE — 95810 POLYSOM 6/> YRS 4/> PARAM: CPT

## 2020-11-18 ENCOUNTER — TELEPHONE (OUTPATIENT)
Dept: SLEEP MEDICINE | Age: 50
End: 2020-11-18

## 2020-11-30 RX ORDER — ATORVASTATIN CALCIUM 20 MG/1
TABLET, FILM COATED ORAL
Qty: 90 TABLET | Refills: 1 | Status: SHIPPED | OUTPATIENT
Start: 2020-11-30 | End: 2021-06-07

## 2020-12-01 NOTE — TELEPHONE ENCOUNTER
Phone call from patient regarding his PSG results. He was advised he does not have FAROOQ. He expressed understanding.

## 2020-12-18 RX ORDER — ROPINIROLE 1 MG/1
1 TABLET, FILM COATED ORAL NIGHTLY
Qty: 30 TABLET | Refills: 5 | Status: SHIPPED | OUTPATIENT
Start: 2020-12-18 | End: 2021-06-23

## 2020-12-23 ENCOUNTER — TELEPHONE (OUTPATIENT)
Dept: FAMILY MEDICINE CLINIC | Age: 50
End: 2020-12-23

## 2020-12-23 NOTE — TELEPHONE ENCOUNTER
Patient states he has not been able to get ahold of the pain clinic, he is almost out of Tizanidine 4 mg to [facility] in [place].          lov- 10/19/20

## 2020-12-24 RX ORDER — TIZANIDINE 4 MG/1
4 TABLET ORAL 3 TIMES DAILY
Qty: 90 TABLET | Refills: 2 | Status: SHIPPED | OUTPATIENT
Start: 2020-12-24 | End: 2022-05-16

## 2020-12-31 DIAGNOSIS — M50.00 DISC DISEASE WITH MYELOPATHY, CERVICAL: ICD-10-CM

## 2020-12-31 DIAGNOSIS — F32.4 MAJOR DEPRESSIVE DISORDER WITH SINGLE EPISODE, IN PARTIAL REMISSION (HCC): ICD-10-CM

## 2021-01-04 RX ORDER — DULOXETIN HYDROCHLORIDE 60 MG/1
CAPSULE, DELAYED RELEASE ORAL
Qty: 90 CAPSULE | Refills: 1 | Status: SHIPPED | OUTPATIENT
Start: 2021-01-04 | End: 2021-06-23

## 2021-01-28 ENCOUNTER — TELEPHONE (OUTPATIENT)
Dept: FAMILY MEDICINE CLINIC | Age: 51
End: 2021-01-28

## 2021-01-28 RX ORDER — VARDENAFIL HYDROCHLORIDE 20 MG/1
20 TABLET ORAL PRN
Qty: 30 TABLET | Refills: 3 | Status: SHIPPED | OUTPATIENT
Start: 2021-01-28

## 2021-03-29 ENCOUNTER — TELEPHONE (OUTPATIENT)
Dept: FAMILY MEDICINE CLINIC | Age: 51
End: 2021-03-29

## 2021-03-29 DIAGNOSIS — Z79.4 TYPE 2 DIABETES MELLITUS WITHOUT COMPLICATION, WITH LONG-TERM CURRENT USE OF INSULIN (HCC): ICD-10-CM

## 2021-03-29 DIAGNOSIS — E11.9 TYPE 2 DIABETES MELLITUS WITHOUT COMPLICATION, WITH LONG-TERM CURRENT USE OF INSULIN (HCC): ICD-10-CM

## 2021-03-29 DIAGNOSIS — Z11.59 ENCOUNTER FOR HEPATITIS C SCREENING TEST FOR LOW RISK PATIENT: Primary | ICD-10-CM

## 2021-03-29 NOTE — TELEPHONE ENCOUNTER
Geraldine patient wife calling to see if you will do a referral for patient to [facility] with Dr Moi Almazan, she sees him as well and wants Saige Schmidt to go there to have his neck issues checked out.       Please call, [phone]

## 2021-03-29 NOTE — TELEPHONE ENCOUNTER
We should probably see him. Review everything with him so we make sure the doctor has been a little bit of blood work to do before he comes in.   It is not fasting

## 2021-04-08 ENCOUNTER — TELEPHONE (OUTPATIENT)
Dept: FAMILY MEDICINE CLINIC | Age: 51
End: 2021-04-08

## 2021-04-08 NOTE — TELEPHONE ENCOUNTER
Debbie Kemp (wife) calling b/c pt got the bw orders in the mail and he wants to know if he can wait until his CPE in June to get the bw done that way \"it would be free\". Pl advise.

## 2021-04-13 ENCOUNTER — OFFICE VISIT (OUTPATIENT)
Dept: FAMILY MEDICINE CLINIC | Age: 51
End: 2021-04-13
Payer: COMMERCIAL

## 2021-04-13 VITALS
BODY MASS INDEX: 32.62 KG/M2 | TEMPERATURE: 97.7 F | HEIGHT: 71 IN | HEART RATE: 64 BPM | SYSTOLIC BLOOD PRESSURE: 124 MMHG | OXYGEN SATURATION: 96 % | WEIGHT: 233 LBS | DIASTOLIC BLOOD PRESSURE: 82 MMHG

## 2021-04-13 DIAGNOSIS — J44.9 CHRONIC OBSTRUCTIVE PULMONARY DISEASE, UNSPECIFIED COPD TYPE (HCC): ICD-10-CM

## 2021-04-13 DIAGNOSIS — G89.29 CHRONIC MIDLINE LOW BACK PAIN WITHOUT SCIATICA: ICD-10-CM

## 2021-04-13 DIAGNOSIS — M54.50 CHRONIC MIDLINE LOW BACK PAIN WITHOUT SCIATICA: ICD-10-CM

## 2021-04-13 DIAGNOSIS — C67.1 MALIGNANT NEOPLASM OF DOME OF URINARY BLADDER (HCC): ICD-10-CM

## 2021-04-13 DIAGNOSIS — G89.29 CHRONIC PAIN OF RIGHT KNEE: ICD-10-CM

## 2021-04-13 DIAGNOSIS — M25.561 CHRONIC PAIN OF RIGHT KNEE: ICD-10-CM

## 2021-04-13 DIAGNOSIS — M50.90 CERVICAL DISC DISEASE: Primary | ICD-10-CM

## 2021-04-13 DIAGNOSIS — F32.4 MAJOR DEPRESSIVE DISORDER WITH SINGLE EPISODE, IN PARTIAL REMISSION (HCC): ICD-10-CM

## 2021-04-13 DIAGNOSIS — Z79.4 TYPE 2 DIABETES MELLITUS WITHOUT COMPLICATION, WITH LONG-TERM CURRENT USE OF INSULIN (HCC): ICD-10-CM

## 2021-04-13 DIAGNOSIS — E11.9 TYPE 2 DIABETES MELLITUS WITHOUT COMPLICATION, WITH LONG-TERM CURRENT USE OF INSULIN (HCC): ICD-10-CM

## 2021-04-13 LAB
BILIRUBIN, POC: NORMAL
BLOOD URINE, POC: NORMAL
CLARITY, POC: CLEAR
COLOR, POC: NORMAL
CREATININE URINE: 72.6 MG/DL (ref 39–259)
GLUCOSE URINE, POC: NORMAL
HBA1C MFR BLD: 7.5 %
KETONES, POC: NORMAL
LEUKOCYTE EST, POC: NORMAL
MICROALBUMIN UR-MCNC: <1.2 MG/DL
MICROALBUMIN/CREAT UR-RTO: NORMAL MG/G (ref 0–30)
NITRITE, POC: NORMAL
PH, POC: 6
PROTEIN, POC: NORMAL
SPECIFIC GRAVITY, POC: 1.01
UROBILINOGEN, POC: 0.2

## 2021-04-13 PROCEDURE — G8427 DOCREV CUR MEDS BY ELIG CLIN: HCPCS | Performed by: INTERNAL MEDICINE

## 2021-04-13 PROCEDURE — 4004F PT TOBACCO SCREEN RCVD TLK: CPT | Performed by: INTERNAL MEDICINE

## 2021-04-13 PROCEDURE — 2022F DILAT RTA XM EVC RTNOPTHY: CPT | Performed by: INTERNAL MEDICINE

## 2021-04-13 PROCEDURE — 3051F HG A1C>EQUAL 7.0%<8.0%: CPT | Performed by: INTERNAL MEDICINE

## 2021-04-13 PROCEDURE — G8926 SPIRO NO PERF OR DOC: HCPCS | Performed by: INTERNAL MEDICINE

## 2021-04-13 PROCEDURE — 83036 HEMOGLOBIN GLYCOSYLATED A1C: CPT | Performed by: INTERNAL MEDICINE

## 2021-04-13 PROCEDURE — 81002 URINALYSIS NONAUTO W/O SCOPE: CPT | Performed by: INTERNAL MEDICINE

## 2021-04-13 PROCEDURE — 3023F SPIROM DOC REV: CPT | Performed by: INTERNAL MEDICINE

## 2021-04-13 PROCEDURE — 3017F COLORECTAL CA SCREEN DOC REV: CPT | Performed by: INTERNAL MEDICINE

## 2021-04-13 PROCEDURE — 99214 OFFICE O/P EST MOD 30 MIN: CPT | Performed by: INTERNAL MEDICINE

## 2021-04-13 PROCEDURE — G8417 CALC BMI ABV UP PARAM F/U: HCPCS | Performed by: INTERNAL MEDICINE

## 2021-04-13 NOTE — PROGRESS NOTES
SUBJECTIVE:  Ni Barney is a 48 y.o. male being evaluated for:    Chief Complaint   Patient presents with    Referral - General     [facility] second opinion on pain neck, shoulders, hands, arm        HPI   Pain in left side of neck  Pain does shoot down his arm all the way  Hx of 2 fusions in his neck one from [facility] and one from [facility] in 2016   Intially surgery helped but came back within a couple of months  NO trauma No imaging of neck and surgery increasing pain in hand arn arm left   Swelling in his finger      Knees Pain on the right  X of infected patella there pain in low back    Depression is stable  Not checking sugars   Breathing is fine no change in cough SOB with steps only now       Allergies   Allergen Reactions    Lyrica [Pregabalin] Other (See Comments)     \"made him black out\"    Wellbutrin [Bupropion] Other (See Comments)     Made him viloent     Current Outpatient Medications   Medication Sig Dispense Refill    Misc.  Devices MISC Message therapy  Edinson zone     eval treat for cervical and lumbar disc disease 1 each 5    vardenafil (LEVITRA) 20 MG tablet Take 1 tablet by mouth as needed for Erectile Dysfunction 30 tablet 3    metFORMIN (GLUCOPHAGE) 500 MG tablet TAKE 1 TABLET BY MOUTH DAILY WITH BREAKFAST 30 tablet 5    DULoxetine (CYMBALTA) 60 MG extended release capsule Take 1 capsule by mouth once daily 90 capsule 1    tiZANidine (ZANAFLEX) 4 MG tablet Take 1 tablet by mouth 3 times daily 90 tablet 2    rOPINIRole (REQUIP) 1 MG tablet Take 1 tablet by mouth nightly 30 tablet 5    atorvastatin (LIPITOR) 20 MG tablet Take 1 tablet by mouth nightly 90 tablet 1    albuterol sulfate HFA (VENTOLIN HFA) 108 (90 Base) MCG/ACT inhaler Inhale 2 puffs into the lungs 4 times daily as needed for Wheezing 1 Inhaler 5    fluticasone-salmeterol (WIXELA INHUB) 500-50 MCG/DOSE diskus inhaler Inhale 1 puff into the lungs 2 times daily 60 each 3    blood glucose test strips (ASCENSIA AUTODISC VI;ONE TOUCH ULTRA TEST VI) strip Test Sugar Once Daily at Alternating Times of Day, DX: E11.9 100 each 3    B Complex Vitamins (B-COMPLEX/B-12 PO) Take by mouth daily      Cholecalciferol (VITAMIN D3 PO) Take by mouth daily      Omega-3 Fatty Acids (FISH OIL PO) Take by mouth daily      Multiple Vitamins-Minerals (MENS MULTIVITAMIN PO) Take by mouth daily      Ascorbic Acid (VITAMIN C PO) Take by mouth daily      POTASSIUM PO Take by mouth daily      gabapentin (NEURONTIN) 600 MG tablet Take 600 mg by mouth 3 times daily.  aspirin EC 81 MG EC tablet Take 1 tablet by mouth daily 30 tablet 5    oxyCODONE HCl (OXY-IR) 10 MG immediate release tablet Take 10 mg by mouth 4 times daily.  ONETOUCH DELICA LANCETS 60A MISC Test Sugar Once Daily at Alternating Times of Day, DX: E11.9 100 each 3    dicyclomine (BENTYL) 10 MG capsule Take 1 capsule by mouth 4 times daily (Patient not taking: Reported on 4/13/2021) 40 capsule 0    Capsaicin 0.075 % STCK Topically daily (Patient not taking: Reported on 10/19/2020) 1 Stick 5     No current facility-administered medications for this visit.           Social History     Socioeconomic History    Marital status:      Spouse name: Not on file    Number of children: Not on file    Years of education: Not on file    Highest education level: Not on file   Occupational History    Not on file   Social Needs    Financial resource strain: Not on file    Food insecurity     Worry: Not on file     Inability: Not on file    Transportation needs     Medical: Not on file     Non-medical: Not on file   Tobacco Use    Smoking status: Current Every Day Smoker     Packs/day: 1.50     Years: 31.00     Pack years: 46.50     Types: Cigarettes    Smokeless tobacco: Never Used    Tobacco comment: 1.50 PPD   Substance and Sexual Activity    Alcohol use: Yes     Comment: social    Drug use: No    Sexual activity: Yes     Partners: Female   Lifestyle    Physical activity Days per week: Not on file     Minutes per session: Not on file    Stress: Not on file   Relationships    Social connections     Talks on phone: Not on file     Gets together: Not on file     Attends [employer] service: Not on file     Active member of club or organization: Not on file     Attends meetings of clubs or organizations: Not on file     Relationship status: Not on file    Intimate partner violence     Fear of current or ex partner: Not on file     Emotionally abused: Not on file     Physically abused: Not on file     Forced sexual activity: Not on file   Other Topics Concern    Not on file   Social History Narrative    Not on file      Past Medical History:   Diagnosis Date    Bladder carcinoma ([address]     Carpal tunnel syndrome, bilateral     Cervical disc disease     COPD (chronic obstructive pulmonary disease) ([address]     mild but still smoking    Depression     Lumbar disc disease     Neck pain     Osteoarthritis     Type 2 diabetes mellitus, controlled ([address] 09/04/2018     Past Surgical History:   Procedure Laterality Date    BLADDER TUMOR EXCISION      CARPAL TUNNEL RELEASE Bilateral 06/2015    CERVICAL DISCECTOMY  11/18/2016    CHOLECYSTECTOMY      KNEE SURGERY Right     TONSILLECTOMY         Review of Systems   Constitutional: Positive for activity change, fatigue and unexpected weight change (up). HENT: Negative for nosebleeds. Eyes: Negative for visual disturbance. Respiratory: Positive for shortness of breath (Chronic and mild). Negative for cough and wheezing. Cardiovascular: Negative for chest pain, palpitations and leg swelling. Gastrointestinal: Negative for abdominal pain, blood in stool, constipation, diarrhea, nausea and vomiting. Genitourinary: Negative for difficulty urinating, frequency and hematuria. Musculoskeletal: Positive for arthralgias, back pain, gait problem and neck pain. Neurological: Negative for dizziness, light-headedness and headaches. Arm radiculopathy   Psychiatric/Behavioral: Positive for dysphoric mood. Negative for suicidal ideas. OBJECTIVE:  /82 (Site: Left Upper Arm, Position: Sitting, Cuff Size: Medium Adult)   Pulse 64   Temp 97.7 °F (36.5 °C) (Temporal)   Ht 5' 11\" (1.803 m)   Wt 233 lb (105.7 kg)   SpO2 96%   BMI 32.50 kg/m²      Body mass index is 32.5 kg/m². Physical Exam  Constitutional:       General: He is not in acute distress. Appearance: Normal appearance. He is well-developed. HENT:      Head: Normocephalic and atraumatic. Mouth/Throat:      Pharynx: Oropharynx is clear. Eyes:      Conjunctiva/sclera: Conjunctivae normal.   Neck:      Musculoskeletal: Neck supple. Muscular tenderness (Posterior tenderness) present. Thyroid: No thyromegaly. Vascular: No carotid bruit or JVD. Comments: Decreased rotation flexion and extension of neck  Cardiovascular:      Rate and Rhythm: Normal rate and regular rhythm. Heart sounds: Normal heart sounds. No murmur. No friction rub. No gallop. Comments:    Pulmonary:      Effort: Pulmonary effort is normal.      Breath sounds: Normal breath sounds. No wheezing. Chest:      Chest wall: No tenderness. Abdominal:      General: There is no distension. Palpations: Abdomen is soft. Tenderness: There is no abdominal tenderness. Comments: No hsm   Musculoskeletal:         General: Tenderness (Across low back) present. No swelling. Lymphadenopathy:      Cervical: No cervical adenopathy. Neurological:      General: No focal deficit present. Mental Status: He is alert. Gait: Gait normal.      Comments:       Psychiatric:         Behavior: Behavior normal.         Thought Content:  Thought content normal.      Comments: Seems a little depressed         ASSESSMENT/PLAN:    Laurence Hollis was seen today for referral - general.    Diagnoses and all orders for this visit:    Cervical disc disease  -     MRI CERVICAL SPINE WO CONTRAST; Future  -     Misc. Devices MISC; Message therapy  Edinson zone     eval treat for cervical and lumbar disc disease      Chronic pain of right knee  -     XR KNEE RIGHT (1-2 VIEWS); Future    Chronic midline low back pain without sciatica  -     Misc. Devices MISC; Message therapy  Edinson zone     eval treat for cervical and lumbar disc disease    Type 2 diabetes mellitus without complication, with long-term current use of insulin (HCC) long-range sugar is fair at 7.5 to work better on diet exercise weight loss low carbohydrate diet with his weight gain  -     POCT glycosylated hemoglobin (Hb A1C)  -     Microalbumin / Creatinine Urine Ratio    Chronic obstructive pulmonary disease, unspecified COPD type ([address] stable on inhalers. Still smoking and discussed    Malignant neoplasm of dome of urinary bladder (HCC)  -     POCT Urinalysis no Micro urinalysis looks well. He tells me he has followed up with Dr. Sohan Chamorro. Stressed need to at least have a cystoscopy yearly    Major depressive disorder with single episode, in partial remission ([address] on Cymbalta    Orders Placed This Encounter   Medications    Misc. Devices MISC     Sig: Message therapy  Edinson zone     eval treat for cervical and lumbar disc disease     Dispense:  1 each     Refill:  5        Return in about 6 months (around 10/13/2021), or if symptoms worsen or fail to improve. There are no Patient Instructions on file for this visit.

## 2021-04-20 ENCOUNTER — TELEPHONE (OUTPATIENT)
Dept: FAMILY MEDICINE CLINIC | Age: 51
End: 2021-04-20

## 2021-04-20 DIAGNOSIS — M54.40 CHRONIC MIDLINE LOW BACK PAIN WITH SCIATICA, SCIATICA LATERALITY UNSPECIFIED: Primary | ICD-10-CM

## 2021-04-20 DIAGNOSIS — G89.29 CHRONIC MIDLINE LOW BACK PAIN WITH SCIATICA, SCIATICA LATERALITY UNSPECIFIED: Primary | ICD-10-CM

## 2021-04-20 NOTE — TELEPHONE ENCOUNTER
Talked to [facility], she thought you were going to order testing for the back also.

## 2021-04-23 ASSESSMENT — ENCOUNTER SYMPTOMS
CONSTIPATION: 0
BACK PAIN: 1
NAUSEA: 0
WHEEZING: 0
DIARRHEA: 0
COUGH: 0
BLOOD IN STOOL: 0
ABDOMINAL PAIN: 0
SHORTNESS OF BREATH: 1
VOMITING: 0

## 2021-04-28 ENCOUNTER — HOSPITAL ENCOUNTER (OUTPATIENT)
Age: 51
Discharge: HOME | End: 2021-04-28
Payer: COMMERCIAL

## 2021-04-28 ENCOUNTER — HOSPITAL ENCOUNTER (OUTPATIENT)
Dept: GENERAL RADIOLOGY | Age: 51
Discharge: HOME | End: 2021-04-28
Payer: COMMERCIAL

## 2021-04-28 ENCOUNTER — HOSPITAL ENCOUNTER (OUTPATIENT)
Dept: MRI IMAGING | Age: 51
Discharge: HOME | End: 2021-04-28
Payer: COMMERCIAL

## 2021-04-28 DIAGNOSIS — M54.40 CHRONIC MIDLINE LOW BACK PAIN WITH SCIATICA, SCIATICA LATERALITY UNSPECIFIED: ICD-10-CM

## 2021-04-28 DIAGNOSIS — M25.561 CHRONIC PAIN OF RIGHT KNEE: ICD-10-CM

## 2021-04-28 DIAGNOSIS — G89.29 CHRONIC PAIN OF RIGHT KNEE: ICD-10-CM

## 2021-04-28 DIAGNOSIS — G89.29 CHRONIC MIDLINE LOW BACK PAIN WITH SCIATICA, SCIATICA LATERALITY UNSPECIFIED: ICD-10-CM

## 2021-04-28 DIAGNOSIS — M50.90 CERVICAL DISC DISEASE: ICD-10-CM

## 2021-04-28 PROCEDURE — 72100 X-RAY EXAM L-S SPINE 2/3 VWS: CPT

## 2021-04-28 PROCEDURE — 73560 X-RAY EXAM OF KNEE 1 OR 2: CPT

## 2021-04-28 PROCEDURE — 72141 MRI NECK SPINE W/O DYE: CPT

## 2021-04-29 DIAGNOSIS — K11.9 LESION OF PAROTID GLAND: Primary | ICD-10-CM

## 2021-05-04 ENCOUNTER — PATIENT MESSAGE (OUTPATIENT)
Dept: FAMILY MEDICINE CLINIC | Age: 51
End: 2021-05-04

## 2021-05-11 DIAGNOSIS — J44.1 ACUTE EXACERBATION OF CHRONIC OBSTRUCTIVE PULMONARY DISEASE (COPD) (HCC): ICD-10-CM

## 2021-06-07 ENCOUNTER — OFFICE VISIT (OUTPATIENT)
Dept: FAMILY MEDICINE CLINIC | Age: 51
End: 2021-06-07
Payer: COMMERCIAL

## 2021-06-07 VITALS
BODY MASS INDEX: 32.76 KG/M2 | HEART RATE: 67 BPM | DIASTOLIC BLOOD PRESSURE: 78 MMHG | TEMPERATURE: 97.9 F | WEIGHT: 234 LBS | OXYGEN SATURATION: 94 % | HEIGHT: 71 IN | SYSTOLIC BLOOD PRESSURE: 124 MMHG

## 2021-06-07 DIAGNOSIS — Z12.5 PROSTATE CANCER SCREENING: ICD-10-CM

## 2021-06-07 DIAGNOSIS — K11.9 LESION OF PAROTID GLAND: ICD-10-CM

## 2021-06-07 DIAGNOSIS — Z12.11 COLON CANCER SCREENING: ICD-10-CM

## 2021-06-07 DIAGNOSIS — E11.9 TYPE 2 DIABETES MELLITUS WITHOUT COMPLICATION, WITH LONG-TERM CURRENT USE OF INSULIN (HCC): ICD-10-CM

## 2021-06-07 DIAGNOSIS — M50.90 CERVICAL DISC DISEASE: ICD-10-CM

## 2021-06-07 DIAGNOSIS — Z00.00 PHYSICAL EXAM: Primary | ICD-10-CM

## 2021-06-07 DIAGNOSIS — Z11.59 ENCOUNTER FOR HEPATITIS C SCREENING TEST FOR LOW RISK PATIENT: ICD-10-CM

## 2021-06-07 DIAGNOSIS — Z79.4 TYPE 2 DIABETES MELLITUS WITHOUT COMPLICATION, WITH LONG-TERM CURRENT USE OF INSULIN (HCC): ICD-10-CM

## 2021-06-07 DIAGNOSIS — J44.9 CHRONIC OBSTRUCTIVE PULMONARY DISEASE, UNSPECIFIED COPD TYPE (HCC): ICD-10-CM

## 2021-06-07 DIAGNOSIS — C67.1 MALIGNANT NEOPLASM OF DOME OF URINARY BLADDER (HCC): ICD-10-CM

## 2021-06-07 PROCEDURE — 90715 TDAP VACCINE 7 YRS/> IM: CPT | Performed by: INTERNAL MEDICINE

## 2021-06-07 PROCEDURE — G0439 PPPS, SUBSEQ VISIT: HCPCS | Performed by: INTERNAL MEDICINE

## 2021-06-07 PROCEDURE — 90471 IMMUNIZATION ADMIN: CPT | Performed by: INTERNAL MEDICINE

## 2021-06-07 RX ORDER — ATORVASTATIN CALCIUM 20 MG/1
TABLET, FILM COATED ORAL
Qty: 90 TABLET | Refills: 0 | Status: SHIPPED | OUTPATIENT
Start: 2021-06-07 | End: 2021-09-14

## 2021-06-07 SDOH — ECONOMIC STABILITY: FOOD INSECURITY: WITHIN THE PAST 12 MONTHS, YOU WORRIED THAT YOUR FOOD WOULD RUN OUT BEFORE YOU GOT MONEY TO BUY MORE.: NEVER TRUE

## 2021-06-07 SDOH — ECONOMIC STABILITY: TRANSPORTATION INSECURITY
IN THE PAST 12 MONTHS, HAS THE LACK OF TRANSPORTATION KEPT YOU FROM MEDICAL APPOINTMENTS OR FROM GETTING MEDICATIONS?: NO

## 2021-06-07 SDOH — ECONOMIC STABILITY: FOOD INSECURITY: WITHIN THE PAST 12 MONTHS, THE FOOD YOU BOUGHT JUST DIDN'T LAST AND YOU DIDN'T HAVE MONEY TO GET MORE.: NEVER TRUE

## 2021-06-07 SDOH — ECONOMIC STABILITY: TRANSPORTATION INSECURITY
IN THE PAST 12 MONTHS, HAS LACK OF TRANSPORTATION KEPT YOU FROM MEETINGS, WORK, OR FROM GETTING THINGS NEEDED FOR DAILY LIVING?: NO

## 2021-06-07 ASSESSMENT — ENCOUNTER SYMPTOMS
SHORTNESS OF BREATH: 0
SINUS PRESSURE: 0
TROUBLE SWALLOWING: 1
VOMITING: 0
COUGH: 1
ABDOMINAL PAIN: 0
DIARRHEA: 0
NAUSEA: 0
BLOOD IN STOOL: 0
APNEA: 0
ROS SKIN COMMENTS: NO CONCERNING SKIN LESIONS
CONSTIPATION: 0
BACK PAIN: 1
WHEEZING: 1

## 2021-06-07 ASSESSMENT — SOCIAL DETERMINANTS OF HEALTH (SDOH): HOW HARD IS IT FOR YOU TO PAY FOR THE VERY BASICS LIKE FOOD, HOUSING, MEDICAL CARE, AND HEATING?: NOT HARD AT ALL

## 2021-06-07 NOTE — PROGRESS NOTES
SUBJECTIVE:  Angie Robles is a 48 y.o. male being evaluated for:    Chief Complaint   Patient presents with    Annual Exam    Shoulder Pain             HPI  Sugars running 130 to 160     Both upper arms hurting  Worse in the left  Biceps feel like lead  Arm described as a swarm of bees   Described as a nallely hinge    Neck disease but pain in upper chest with most neck positions  Gets the swarm of bees in it again  Unbearable at times   Has seen [facility]  is scheduled to see an ENT  Sees Dr Umberto Castle for pain management  Cervical mri with moderate spinal stenosis at C 3/C 4  Has appointment with ENT for parotid biopsy      Heart pounding  Drinking lots of sugar and caffeine  No chest pain   Allergies   Allergen Reactions    Lyrica [Pregabalin] Other (See Comments)     \"made him black out\"    Wellbutrin [Bupropion] Other (See Comments)     Made him viloent     Current Outpatient Medications   Medication Sig Dispense Refill    atorvastatin (LIPITOR) 20 MG tablet Take 1 tablet by mouth nightly 90 tablet 0    fluticasone-salmeterol (ADVAIR) 500-50 MCG/DOSE diskus inhaler INHALE 1 PUFF INTO THE LUNGS TWICE DAILY 60 each 5    Misc.  Devices MISC Message therapy  [place] zone     eval treat for cervical and lumbar disc disease 1 each 5    vardenafil (LEVITRA) 20 MG tablet Take 1 tablet by mouth as needed for Erectile Dysfunction 30 tablet 3    metFORMIN (GLUCOPHAGE) 500 MG tablet TAKE 1 TABLET BY MOUTH DAILY WITH BREAKFAST 30 tablet 5    DULoxetine (CYMBALTA) 60 MG extended release capsule Take 1 capsule by mouth once daily 90 capsule 1    tiZANidine (ZANAFLEX) 4 MG tablet Take 1 tablet by mouth 3 times daily 90 tablet 2    rOPINIRole (REQUIP) 1 MG tablet Take 1 tablet by mouth nightly 30 tablet 5    dicyclomine (BENTYL) 10 MG capsule Take 1 capsule by mouth 4 times daily 40 capsule 0    Capsaicin 0.075 % STCK Topically daily 1 Stick 5    albuterol sulfate HFA (VENTOLIN HFA) 108 (90 Base) MCG/ACT inhaler Inhale 2 puffs into the lungs 4 times daily as needed for Wheezing 1 Inhaler 5    blood glucose test strips (ASCENSIA AUTODISC VI;ONE TOUCH ULTRA TEST VI) strip Test Sugar Once Daily at Alternating Times of Day, DX: E11.9 100 each 3    B Complex Vitamins (B-COMPLEX/B-12 PO) Take by mouth daily      Cholecalciferol (VITAMIN D3 PO) Take by mouth daily      Omega-3 Fatty Acids (FISH OIL PO) Take by mouth daily      Multiple Vitamins-Minerals (MENS MULTIVITAMIN PO) Take by mouth daily      Ascorbic Acid (VITAMIN C PO) Take by mouth daily      POTASSIUM PO Take by mouth daily      gabapentin (NEURONTIN) 600 MG tablet Take 600 mg by mouth 3 times daily.  aspirin EC 81 MG EC tablet Take 1 tablet by mouth daily 30 tablet 5    oxyCODONE HCl (OXY-IR) 10 MG immediate release tablet Take 10 mg by mouth 4 times daily.  ONETOUCH DELICA LANCETS 48S MISC Test Sugar Once Daily at Alternating Times of Day, DX: E11.9 100 each 3     No current facility-administered medications for this visit.          Social History     Socioeconomic History    Marital status:      Spouse name: Not on file    Number of children: Not on file    Years of education: Not on file    Highest education level: Not on file   Occupational History    Not on file   Tobacco Use    Smoking status: Current Every Day Smoker     Packs/day: 1.50     Years: 31.00     Pack years: 46.50     Types: Cigarettes    Smokeless tobacco: Never Used    Tobacco comment: 1.50 PPD   Vaping Use    Vaping Use: Never used   Substance and Sexual Activity    Alcohol use: Yes     Comment: social    Drug use: No    Sexual activity: Yes     Partners: Female   Other Topics Concern    Not on file   Social History Narrative    Not on file     Social Determinants of Health     Financial Resource Strain: Low Risk     Difficulty of Paying Living Expenses: Not hard at all   Food Insecurity: No Food Insecurity    Worried About [address] Do Now I Don't in the Last Year: Never true    Ran Out of Food in the Last Year: Never true   Transportation Needs: No Transportation Needs    Lack of Transportation (Medical): No    Lack of Transportation (Non-Medical): No   Physical Activity:     Days of Exercise per Week:     Minutes of Exercise per Session:    Stress:     Feeling of Stress :    Social Connections:     Frequency of Communication with Friends and Family:     Frequency of Social Gatherings with Friends and Family:     Attends Sikh Services:     Active Member of Clubs or Organizations:     Attends Club or Organization Meetings:     Marital Status:    Intimate Partner Violence:     Fear of Current or Ex-Partner:     Emotionally Abused:     Physically Abused:     Sexually Abused:       Past Medical History:   Diagnosis Date    Bl[facility] [address]     Carpal tunnel syndrome, bilateral     Cervical disc disease     COPD (chronic obstructive pulmonary disease) ([facility] [address]     mild but still smoking    Depression     Lumbar disc disease     Neck pain     Osteoarthritis     Type 2 diabetes mellitus, controlled ([facility] [address] 09/04/2018     Past Surgical History:   Procedure Laterality Date    BLADDER TUMOR EXCISION      CARPAL TUNNEL RELEASE Bilateral 06/2015    CERVICAL DISCECTOMY  11/18/2016    CERVICAL FUSION      CHOLECYSTECTOMY      KNEE SURGERY Right     TONSILLECTOMY       Family History   Problem Relation Age of Onset    COPD Mother     Depression Father         manic    Coronary Art Dis Maternal Grandfather     Cancer Maternal Grandfather         skin cancer     Diabetes Paternal Grandmother     Cancer Maternal Uncle         does not know the type     No Known Problems Paternal Grandfather        Review of Systems   Constitutional: Positive for activity change and fatigue. Negative for unexpected weight change. HENT: Positive for trouble swallowing. Negative for congestion, nosebleeds and sinus pressure.     Eyes: Negative for visual Tenderness: There is no abdominal tenderness. Comments: No hsm   Musculoskeletal:         General: Tenderness (Across low back  shoulders ) present. No swelling. Cervical back: Neck supple. Muscular tenderness (Posterior tenderness) present. Lymphadenopathy:      Cervical: No cervical adenopathy. Skin:     General: Skin is warm and dry. Neurological:      General: No focal deficit present. Mental Status: He is alert. Gait: Gait normal.      Comments:       Psychiatric:         Behavior: Behavior normal.         Thought Content: Thought content normal.      Comments: Flat          ASSESSMENT/PLAN:    Vern Maurice was seen today for annual exam and shoulder pain. 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 Return in about 6 months (around 12/7/2021), or if symptoms worsen or fail to improve. There are no Patient Instructions on file for this visit.

## 2021-06-08 ENCOUNTER — TELEPHONE (OUTPATIENT)
Dept: FAMILY MEDICINE CLINIC | Age: 51
End: 2021-06-08

## 2021-06-08 NOTE — TELEPHONE ENCOUNTER
Luis A Segovia (wife) calling b/c pt wants to know if there is anything that the pt can take for dry mouth. Pl call [phone] to advise.

## 2021-06-22 ENCOUNTER — TELEPHONE (OUTPATIENT)
Dept: FAMILY MEDICINE CLINIC | Age: 51
End: 2021-06-22

## 2021-06-22 DIAGNOSIS — M50.90 CERVICAL DISC DISEASE: Primary | ICD-10-CM

## 2021-06-22 NOTE — TELEPHONE ENCOUNTER
Pt is asking for a referral for  [facility] for   Neurosurgeon     Neck pain   Fax # [phone]    Pl advise.      [phone]

## 2021-06-23 DIAGNOSIS — F32.4 MAJOR DEPRESSIVE DISORDER WITH SINGLE EPISODE, IN PARTIAL REMISSION (HCC): ICD-10-CM

## 2021-06-23 DIAGNOSIS — M50.00 DISC DISEASE WITH MYELOPATHY, CERVICAL: ICD-10-CM

## 2021-06-23 DIAGNOSIS — G25.81 RLS (RESTLESS LEGS SYNDROME): ICD-10-CM

## 2021-06-23 RX ORDER — ROPINIROLE 1 MG/1
1 TABLET, FILM COATED ORAL NIGHTLY
Qty: 90 TABLET | Refills: 1 | Status: SHIPPED | OUTPATIENT
Start: 2021-06-23 | End: 2021-12-17

## 2021-06-23 RX ORDER — DULOXETIN HYDROCHLORIDE 60 MG/1
CAPSULE, DELAYED RELEASE ORAL
Qty: 90 CAPSULE | Refills: 1 | Status: SHIPPED | OUTPATIENT
Start: 2021-06-23 | End: 2021-12-17

## 2021-06-23 NOTE — TELEPHONE ENCOUNTER
Lm for pt to return call for referral info.   [address] [facility]   [address]   [facility], [address] [place] [address]   Ph: [phone]   Fax: [phone]

## 2021-06-25 ENCOUNTER — TELEPHONE (OUTPATIENT)
Dept: FAMILY MEDICINE CLINIC | Age: 51
End: 2021-06-25

## 2021-06-25 NOTE — TELEPHONE ENCOUNTER
----- Message from Preston Memory sent at 6/25/2021  2:36 PM EDT -----  Subject: Referral Request    QUESTIONS   Reason for referral request? Pt says referral to [facility]    has not been received   Has the physician seen you for this condition before? Yes  Select a date? 2021-06-07  Select the physician (PCP or Specialist)? Outside Physician - [facility]    Preferred Specialist (if applicable)? Do you already have an appointment scheduled? No  Additional Information for Provider? Would like referral made as soon as   possible; [facility] fax# is [phone]  ---------------------------------------------------------------------------  --------------  CALL BACK INFO  What is the best way for the office to contact you? OK to leave message on   voicemail, OK to respond with electronic message via [facility] portal (only   for patients who have registered [facility] account)  Preferred Call Back Phone Number?  [phone]

## 2021-09-10 ENCOUNTER — HOSPITAL ENCOUNTER (OUTPATIENT)
Dept: CT IMAGING | Age: 51
Discharge: HOME | End: 2021-09-10
Payer: MEDICARE

## 2021-09-10 DIAGNOSIS — M50.21 DISPLACEMENT OF INTERVERTEBRAL DISC OF HIGH CERVICAL REGION: ICD-10-CM

## 2021-09-10 PROCEDURE — 72125 CT NECK SPINE W/O DYE: CPT

## 2021-09-14 RX ORDER — ATORVASTATIN CALCIUM 20 MG/1
TABLET, FILM COATED ORAL
Qty: 90 TABLET | Refills: 1 | Status: SHIPPED | OUTPATIENT
Start: 2021-09-14 | End: 2021-10-05 | Stop reason: DRUGHIGH

## 2021-09-30 ENCOUNTER — OFFICE VISIT (OUTPATIENT)
Dept: FAMILY MEDICINE CLINIC | Age: 51
End: 2021-09-30
Payer: MEDICARE

## 2021-09-30 VITALS
TEMPERATURE: 98.5 F | WEIGHT: 234 LBS | HEIGHT: 71 IN | HEART RATE: 80 BPM | OXYGEN SATURATION: 95 % | BODY MASS INDEX: 32.76 KG/M2 | SYSTOLIC BLOOD PRESSURE: 128 MMHG | DIASTOLIC BLOOD PRESSURE: 86 MMHG

## 2021-09-30 DIAGNOSIS — M54.12 CERVICAL RADICULOPATHY: ICD-10-CM

## 2021-09-30 DIAGNOSIS — E11.9 TYPE 2 DIABETES MELLITUS WITHOUT COMPLICATION, WITH LONG-TERM CURRENT USE OF INSULIN (HCC): ICD-10-CM

## 2021-09-30 DIAGNOSIS — Z01.818 PRE-OP EXAM: Primary | ICD-10-CM

## 2021-09-30 DIAGNOSIS — Z79.4 TYPE 2 DIABETES MELLITUS WITHOUT COMPLICATION, WITH LONG-TERM CURRENT USE OF INSULIN (HCC): ICD-10-CM

## 2021-09-30 DIAGNOSIS — C67.1 MALIGNANT NEOPLASM OF DOME OF URINARY BLADDER (HCC): ICD-10-CM

## 2021-09-30 DIAGNOSIS — F17.219 CIGARETTE NICOTINE DEPENDENCE WITH NICOTINE-INDUCED DISORDER: ICD-10-CM

## 2021-09-30 DIAGNOSIS — J44.9 CHRONIC OBSTRUCTIVE PULMONARY DISEASE, UNSPECIFIED COPD TYPE (HCC): ICD-10-CM

## 2021-09-30 DIAGNOSIS — Z87.891 PERSONAL HISTORY OF TOBACCO USE: ICD-10-CM

## 2021-09-30 PROCEDURE — G8417 CALC BMI ABV UP PARAM F/U: HCPCS | Performed by: INTERNAL MEDICINE

## 2021-09-30 PROCEDURE — G0008 ADMIN INFLUENZA VIRUS VAC: HCPCS | Performed by: INTERNAL MEDICINE

## 2021-09-30 PROCEDURE — G8926 SPIRO NO PERF OR DOC: HCPCS | Performed by: INTERNAL MEDICINE

## 2021-09-30 PROCEDURE — 3023F SPIROM DOC REV: CPT | Performed by: INTERNAL MEDICINE

## 2021-09-30 PROCEDURE — G8427 DOCREV CUR MEDS BY ELIG CLIN: HCPCS | Performed by: INTERNAL MEDICINE

## 2021-09-30 PROCEDURE — 93000 ELECTROCARDIOGRAM COMPLETE: CPT | Performed by: INTERNAL MEDICINE

## 2021-09-30 PROCEDURE — 2022F DILAT RTA XM EVC RTNOPTHY: CPT | Performed by: INTERNAL MEDICINE

## 2021-09-30 PROCEDURE — 90674 CCIIV4 VAC NO PRSV 0.5 ML IM: CPT | Performed by: INTERNAL MEDICINE

## 2021-09-30 PROCEDURE — 99212 OFFICE O/P EST SF 10 MIN: CPT | Performed by: INTERNAL MEDICINE

## 2021-09-30 ASSESSMENT — ENCOUNTER SYMPTOMS
COUGH: 0
DIARRHEA: 0
TROUBLE SWALLOWING: 0
NAUSEA: 0
BLOOD IN STOOL: 0
APNEA: 0
SHORTNESS OF BREATH: 0
CONSTIPATION: 0
BACK PAIN: 1
ABDOMINAL PAIN: 0
VOMITING: 0

## 2021-10-01 LAB
ABO/RH: NORMAL
ANTIBODY SCREEN: NORMAL
APTT: 39.9 SEC (ref 26.2–38.6)
HCT VFR BLD CALC: 48 % (ref 40.5–52.5)
HEMOGLOBIN: 16.2 G/DL (ref 13.5–17.5)
INR BLD: 1.01 (ref 0.88–1.12)
MCH RBC QN AUTO: 32.7 PG (ref 26–34)
MCHC RBC AUTO-ENTMCNC: 33.7 G/DL (ref 31–36)
MCV RBC AUTO: 97.1 FL (ref 80–100)
PDW BLD-RTO: 13.3 % (ref 12.4–15.4)
PLATELET # BLD: 98 K/UL (ref 135–450)
PMV BLD AUTO: 10.4 FL (ref 5–10.5)
PROTHROMBIN TIME: 11.4 SEC (ref 9.9–12.7)
RBC # BLD: 4.95 M/UL (ref 4.2–5.9)
WBC # BLD: 8.8 K/UL (ref 4–11)

## 2021-10-04 RX ORDER — EMPAGLIFLOZIN 25 MG/1
1 TABLET, FILM COATED ORAL DAILY
Qty: 30 TABLET | Refills: 5 | Status: CANCELLED | OUTPATIENT
Start: 2021-10-04

## 2021-10-04 RX ORDER — METFORMIN HYDROCHLORIDE 500 MG/1
2000 TABLET, EXTENDED RELEASE ORAL
Qty: 360 TABLET | Refills: 0 | Status: CANCELLED | OUTPATIENT
Start: 2021-10-04

## 2021-10-05 RX ORDER — ATORVASTATIN CALCIUM 40 MG/1
40 TABLET, FILM COATED ORAL DAILY
Qty: 90 TABLET | Refills: 1 | Status: SHIPPED | OUTPATIENT
Start: 2021-10-05 | End: 2022-03-02 | Stop reason: SDUPTHER

## 2021-10-05 RX ORDER — METFORMIN HYDROCHLORIDE 500 MG/1
2000 TABLET, EXTENDED RELEASE ORAL
Qty: 360 TABLET | Refills: 1 | Status: SHIPPED | OUTPATIENT
Start: 2021-10-05 | End: 2022-03-02 | Stop reason: SDUPTHER

## 2021-10-05 RX ORDER — EMPAGLIFLOZIN 25 MG/1
1 TABLET, FILM COATED ORAL DAILY
Qty: 90 TABLET | Refills: 1 | Status: SHIPPED | OUTPATIENT
Start: 2021-10-05 | End: 2022-03-02 | Stop reason: SDUPTHER

## 2021-12-02 ENCOUNTER — TELEPHONE (OUTPATIENT)
Dept: FAMILY MEDICINE CLINIC | Age: 51
End: 2021-12-02

## 2021-12-02 DIAGNOSIS — K64.9 HEMORRHOIDS, UNSPECIFIED HEMORRHOID TYPE: Primary | ICD-10-CM

## 2021-12-02 NOTE — TELEPHONE ENCOUNTER
Patient is having terrible time with hemorrhoids, and has tried over the counter medications with no relief. is there something you can recommend or can you see him for this. .  He has seen Dr Radha Esteban in the past.        Please advise, [phone]

## 2021-12-02 NOTE — TELEPHONE ENCOUNTER
Gave the number to call and schedule.     [facility]   [address], [address]   [place], [address]

## 2021-12-17 DIAGNOSIS — M50.00 DISC DISEASE WITH MYELOPATHY, CERVICAL: ICD-10-CM

## 2021-12-17 DIAGNOSIS — G25.81 RLS (RESTLESS LEGS SYNDROME): ICD-10-CM

## 2021-12-17 DIAGNOSIS — F32.4 MAJOR DEPRESSIVE DISORDER WITH SINGLE EPISODE, IN PARTIAL REMISSION (HCC): ICD-10-CM

## 2021-12-17 RX ORDER — DULOXETIN HYDROCHLORIDE 60 MG/1
CAPSULE, DELAYED RELEASE ORAL
Qty: 90 CAPSULE | Refills: 0 | Status: SHIPPED | OUTPATIENT
Start: 2021-12-17 | End: 2022-03-21

## 2021-12-17 RX ORDER — ALBUTEROL SULFATE 90 UG/1
AEROSOL, METERED RESPIRATORY (INHALATION)
Qty: 9 G | Refills: 0 | Status: SHIPPED | OUTPATIENT
Start: 2021-12-17

## 2021-12-17 RX ORDER — ROPINIROLE 1 MG/1
1 TABLET, FILM COATED ORAL NIGHTLY
Qty: 90 TABLET | Refills: 0 | Status: SHIPPED | OUTPATIENT
Start: 2021-12-17 | End: 2022-03-21

## 2022-03-02 RX ORDER — METFORMIN HYDROCHLORIDE 500 MG/1
2000 TABLET, EXTENDED RELEASE ORAL
Qty: 360 TABLET | Refills: 1 | Status: SHIPPED | OUTPATIENT
Start: 2022-03-02 | End: 2022-09-06

## 2022-03-02 RX ORDER — EMPAGLIFLOZIN 25 MG/1
1 TABLET, FILM COATED ORAL DAILY
Qty: 90 TABLET | Refills: 1 | Status: SHIPPED | OUTPATIENT
Start: 2022-03-02 | End: 2022-07-12

## 2022-03-02 RX ORDER — ATORVASTATIN CALCIUM 40 MG/1
40 TABLET, FILM COATED ORAL DAILY
Qty: 90 TABLET | Refills: 1 | Status: SHIPPED | OUTPATIENT
Start: 2022-03-02 | End: 2022-09-06

## 2022-03-02 NOTE — TELEPHONE ENCOUNTER
Julia Course w/ [address] stated that pt is needing a   90 day supply on the following:    Metformin   [address], [address] [facility]

## 2022-03-21 DIAGNOSIS — G25.81 RLS (RESTLESS LEGS SYNDROME): ICD-10-CM

## 2022-03-21 DIAGNOSIS — M50.00 DISC DISEASE WITH MYELOPATHY, CERVICAL: ICD-10-CM

## 2022-03-21 DIAGNOSIS — F32.4 MAJOR DEPRESSIVE DISORDER WITH SINGLE EPISODE, IN PARTIAL REMISSION (HCC): ICD-10-CM

## 2022-03-21 RX ORDER — DULOXETIN HYDROCHLORIDE 60 MG/1
CAPSULE, DELAYED RELEASE ORAL
Qty: 90 CAPSULE | Refills: 1 | Status: SHIPPED | OUTPATIENT
Start: 2022-03-21 | End: 2022-09-23

## 2022-03-21 RX ORDER — ROPINIROLE 1 MG/1
1 TABLET, FILM COATED ORAL NIGHTLY
Qty: 90 TABLET | Refills: 1 | Status: SHIPPED | OUTPATIENT
Start: 2022-03-21 | End: 2022-07-12 | Stop reason: DRUGHIGH

## 2022-03-30 DIAGNOSIS — E11.9 TYPE 2 DIABETES MELLITUS WITHOUT COMPLICATION, WITH LONG-TERM CURRENT USE OF INSULIN (HCC): Primary | ICD-10-CM

## 2022-03-30 DIAGNOSIS — D69.6 THROMBOCYTOPENIA (HCC): ICD-10-CM

## 2022-03-30 DIAGNOSIS — E78.9 BORDERLINE HIGH CHOLESTEROL: ICD-10-CM

## 2022-03-30 DIAGNOSIS — Z79.4 TYPE 2 DIABETES MELLITUS WITHOUT COMPLICATION, WITH LONG-TERM CURRENT USE OF INSULIN (HCC): Primary | ICD-10-CM

## 2022-05-05 ENCOUNTER — TELEPHONE (OUTPATIENT)
Dept: FAMILY MEDICINE CLINIC | Age: 52
End: 2022-05-05

## 2022-05-05 NOTE — TELEPHONE ENCOUNTER
----- Message from Kourtney Hein sent at 5/5/2022  3:12 PM EDT -----  Subject: Appointment Request    Reason for Call: Routine Pre-Op    QUESTIONS  Type of Appointment? Established Patient  Reason for appointment request? No appointments available during search  Additional Information for Provider? pt needs pre op for spinal fusion   surgery on 5/25 at [facility] EKG needed, screened green   ---------------------------------------------------------------------------  --------------  [employer]  What is the best way for the office to contact you? OK to leave message on   voicemail  Preferred Call Back Phone Number? [phone]  ---------------------------------------------------------------------------  --------------  SCRIPT ANSWERS  Relationship to Patient? Self  Do you have questions for your provider that need to be answered prior to   scheduling your pre-op appointment? No  Have you been diagnosed with, awaiting test results for, or told that you   are suspected of having COVID-19 (Coronavirus)? (If patient has tested   negative or was tested as a requirement for work, school, or travel and   not based on symptoms, answer no)? No  Within the past 10 days have you developed any of the following symptoms   (answer no if symptoms have been present longer than 10 days or began   more than 10 days ago)? Fever or Chills, Cough, Shortness of breath or   difficulty breathing, Loss of taste or smell, Sore throat, Nasal   congestion, Sneezing or runny nose, Fatigue or generalized body aches   (answer no if pain is specific to a body part e.g. back pain), Diarrhea,   Headache? No  Have you had close contact with someone with COVID-19 in the last 7 days? No  (Service Expert  click yes below to proceed with Earlier Media As Usual   Scheduling)?  Yes

## 2022-05-16 ENCOUNTER — OFFICE VISIT (OUTPATIENT)
Dept: FAMILY MEDICINE CLINIC | Age: 52
End: 2022-05-16
Payer: MEDICARE

## 2022-05-16 VITALS
SYSTOLIC BLOOD PRESSURE: 108 MMHG | BODY MASS INDEX: 30.13 KG/M2 | DIASTOLIC BLOOD PRESSURE: 60 MMHG | HEIGHT: 71 IN | WEIGHT: 215.2 LBS | RESPIRATION RATE: 16 BRPM | TEMPERATURE: 97.8 F | OXYGEN SATURATION: 96 % | HEART RATE: 87 BPM

## 2022-05-16 DIAGNOSIS — E11.9 TYPE 2 DIABETES MELLITUS WITHOUT COMPLICATION, WITH LONG-TERM CURRENT USE OF INSULIN (HCC): ICD-10-CM

## 2022-05-16 DIAGNOSIS — M50.90 CERVICAL DISC DISEASE: ICD-10-CM

## 2022-05-16 DIAGNOSIS — Z01.818 PRE-OP EXAMINATION: Primary | ICD-10-CM

## 2022-05-16 DIAGNOSIS — Z79.4 TYPE 2 DIABETES MELLITUS WITHOUT COMPLICATION, WITH LONG-TERM CURRENT USE OF INSULIN (HCC): ICD-10-CM

## 2022-05-16 DIAGNOSIS — J44.9 CHRONIC OBSTRUCTIVE PULMONARY DISEASE, UNSPECIFIED COPD TYPE (HCC): ICD-10-CM

## 2022-05-16 DIAGNOSIS — F32.4 MAJOR DEPRESSIVE DISORDER WITH SINGLE EPISODE, IN PARTIAL REMISSION (HCC): ICD-10-CM

## 2022-05-16 DIAGNOSIS — C67.1 MALIGNANT NEOPLASM OF DOME OF URINARY BLADDER (HCC): ICD-10-CM

## 2022-05-16 LAB — HBA1C MFR BLD: 6.7 %

## 2022-05-16 PROCEDURE — 99202 OFFICE O/P NEW SF 15 MIN: CPT | Performed by: INTERNAL MEDICINE

## 2022-05-16 PROCEDURE — 93000 ELECTROCARDIOGRAM COMPLETE: CPT | Performed by: INTERNAL MEDICINE

## 2022-05-16 PROCEDURE — 83036 HEMOGLOBIN GLYCOSYLATED A1C: CPT | Performed by: INTERNAL MEDICINE

## 2022-05-16 RX ORDER — DOCUSATE SODIUM 100 MG/1
100 CAPSULE, LIQUID FILLED ORAL DAILY
COMMUNITY

## 2022-05-16 RX ORDER — FLUTICASONE PROPIONATE AND SALMETEROL 500; 50 UG/1; UG/1
1 POWDER RESPIRATORY (INHALATION) EVERY 12 HOURS
Qty: 60 EACH | Refills: 3
Start: 2022-05-16

## 2022-05-16 RX ORDER — CYCLOBENZAPRINE HCL 10 MG
10 TABLET ORAL 3 TIMES DAILY PRN
COMMUNITY
Start: 2022-04-19

## 2022-05-16 ASSESSMENT — ENCOUNTER SYMPTOMS
COUGH: 0
VOMITING: 0
BLOOD IN STOOL: 0
NAUSEA: 0
SHORTNESS OF BREATH: 0
BACK PAIN: 0
TROUBLE SWALLOWING: 0
APNEA: 0
DIARRHEA: 0
CONSTIPATION: 0
WHEEZING: 0
ABDOMINAL PAIN: 0

## 2022-05-16 ASSESSMENT — PATIENT HEALTH QUESTIONNAIRE - PHQ9
4. FEELING TIRED OR HAVING LITTLE ENERGY: 3
7. TROUBLE CONCENTRATING ON THINGS, SUCH AS READING THE NEWSPAPER OR WATCHING TELEVISION: 0
8. MOVING OR SPEAKING SO SLOWLY THAT OTHER PEOPLE COULD HAVE NOTICED. OR THE OPPOSITE, BEING SO FIGETY OR RESTLESS THAT YOU HAVE BEEN MOVING AROUND A LOT MORE THAN USUAL: 0
5. POOR APPETITE OR OVEREATING: 0
9. THOUGHTS THAT YOU WOULD BE BETTER OFF DEAD, OR OF HURTING YOURSELF: 0
2. FEELING DOWN, DEPRESSED OR HOPELESS: 1
SUM OF ALL RESPONSES TO PHQ QUESTIONS 1-9: 7
1. LITTLE INTEREST OR PLEASURE IN DOING THINGS: 0
6. FEELING BAD ABOUT YOURSELF - OR THAT YOU ARE A FAILURE OR HAVE LET YOURSELF OR YOUR FAMILY DOWN: 0
SUM OF ALL RESPONSES TO PHQ QUESTIONS 1-9: 7
10. IF YOU CHECKED OFF ANY PROBLEMS, HOW DIFFICULT HAVE THESE PROBLEMS MADE IT FOR YOU TO DO YOUR WORK, TAKE CARE OF THINGS AT HOME, OR GET ALONG WITH OTHER PEOPLE: 0
3. TROUBLE FALLING OR STAYING ASLEEP: 3
SUM OF ALL RESPONSES TO PHQ QUESTIONS 1-9: 7
SUM OF ALL RESPONSES TO PHQ QUESTIONS 1-9: 7
SUM OF ALL RESPONSES TO PHQ9 QUESTIONS 1 & 2: 1

## 2022-05-16 NOTE — PROGRESS NOTES
SUBJECTIVE:  Jil Stewart is a 46 y.o. male being evaluated for:    Chief Complaint   Patient presents with    Pre-op Exam     PT HAVING SPINAL FUSION BY DR Tom Garcia @ [facility] ON 5/25/22.  [phone]       HPI   Neck pain for years but worsening over the last 2 to 3 months  More severe Burning sensation and hypersensitivity of his left arm  Hx of cervical fusion C4,5 6 anterior fusion requiring redo posteriorly same year  C3 and C4 requiring surgery octorber 2021  Now MRI with disc protrusion to the left and scheduled for extension of his fusion C6C7 level    Sugars running 130 to 140 but not checking very often   Allergies   Allergen Reactions    Lyrica [Pregabalin] Other (See Comments)     \"made him black out\"    Wellbutrin [Bupropion] Other (See Comments)     Made him viloent     Current Outpatient Medications   Medication Sig Dispense Refill    cyclobenzaprine (FLEXERIL) 10 MG tablet Take 10 mg by mouth 3 times daily as needed      docusate sodium (EQL STOOL SOFTENER) 100 MG capsule Take 100 mg by mouth daily      fluticasone-salmeterol (ADVAIR) 500-50 MCG/ACT AEPB diskus inhaler Inhale 1 puff into the lungs every 12 hours 60 each 3    rOPINIRole (REQUIP) 1 MG tablet Take 1 tablet by mouth nightly 90 tablet 1    DULoxetine (CYMBALTA) 60 MG extended release capsule Take 1 capsule by mouth once daily 90 capsule 1    metFORMIN (GLUCOPHAGE-XR) 500 MG extended release tablet Take 4 tablets by mouth daily (with breakfast) 360 tablet 1    empagliflozin (JARDIANCE) 25 MG tablet Take 1 tablet by mouth daily 90 tablet 1    atorvastatin (LIPITOR) 40 MG tablet Take 1 tablet by mouth daily 90 tablet 1    albuterol sulfate  (90 Base) MCG/ACT inhaler INHALE 2 PUFFS BY MOUTH 4 TIMES DAILY AS NEEDED FOR WHEEZING 9 g 0    blood glucose test strips (ASCENSIA AUTODISC VI;ONE TOUCH ULTRA TEST VI) strip Test Sugar Once Daily at Alternating Times of Day, DX: E11.9 100 each 3    gabapentin (NEURONTIN) 600 MG tablet Take 600 mg by mouth 3 times daily.  oxyCODONE HCl (OXY-IR) 10 MG immediate release tablet Take 10 mg by mouth 4 times daily.  ONETOUCH DELICA LANCETS 01D MISC Test Sugar Once Daily at Alternating Times of Day, DX: E11.9 100 each 3    vardenafil (LEVITRA) 20 MG tablet Take 1 tablet by mouth as needed for Erectile Dysfunction (Patient not taking: Reported on 5/16/2022) 30 tablet 3     No current facility-administered medications for this visit. Social History     Socioeconomic History    Marital status:      Spouse name: Not on file    Number of children: Not on file    Years of education: Not on file    Highest education level: Not on file   Occupational History    Not on file   Tobacco Use    Smoking status: Current Every Day Smoker     Packs/day: 1.50     Years: 31.00     Pack years: 46.50     Types: Cigarettes    Smokeless tobacco: Never Used    Tobacco comment: cutting down    Vaping Use    Vaping Use: Never used   Substance and Sexual Activity    Alcohol use: Yes     Comment: social    Drug use: No    Sexual activity: Yes     Partners: Female   Other Topics Concern    Not on file   Social History Narrative    Not on file     Social Determinants of Health     Financial Resource Strain: Low Risk     Difficulty of Paying Living Expenses: Not hard at all   Food Insecurity: No Food Insecurity    Worried About Running Out of Food in the Last Year: Never true    Kristy of Food in the Last Year: Never true   Transportation Needs: No Transportation Needs    Lack of Transportation (Medical): No    Lack of Transportation (Non-Medical):  No   Physical Activity:     Days of Exercise per Week: Not on file    Minutes of Exercise per Session: Not on file   Stress:     Feeling of Stress : Not on file   Social Connections:     Frequency of Communication with Friends and Family: Not on file    Frequency of Social Gatherings with Friends and Family: Not on file    Attends Druze Services: Not on file    Active Member of Clubs or Organizations: Not on file    Attends Club or Organization Meetings: Not on file    Marital Status: Not on file   Intimate Partner Violence:     Fear of Current or Ex-Partner: Not on file    Emotionally Abused: Not on file    Physically Abused: Not on file    Sexually Abused: Not on file   Housing Stability:     Unable to Pay for Housing in the Last Year: Not on file    Number of [place] in the Last Year: Not on file    Unstable Housing in the Last Year: Not on file      Past Medical History:   Diagnosis Date    Bladder carcinoma [facility])     sees Dr Americo Alva yearly    Carpal tunnel syndrome, bilateral     Cervical disc disease     COPD (chronic obstructive pulmonary disease) ([facility] [address]     mild but still smoking    Depression     Hyperlipidemia     Lumbar disc disease     Neck pain     Osteoarthritis     Type 2 diabetes mellitus, controlled ([facility] [address] 09/04/2018     Past Surgical History:   Procedure Laterality Date    BLADDER TUMOR EXCISION      CARPAL TUNNEL RELEASE Bilateral 06/2015    CERVICAL DISCECTOMY  11/18/2016    CERVICAL FUSION      CHOLECYSTECTOMY      KNEE SURGERY Right     TONSILLECTOMY       Family History   Problem Relation Age of Onset    COPD Mother     Depression Father         manic    Coronary Art Dis Maternal Grandfather     Cancer Maternal Grandfather         skin cancer     Diabetes Paternal Grandmother     Cancer Maternal Uncle         does not know the type     No Known Problems Paternal Grandfather    paternal gm with cancer not sure but question breast   no family hx of problems with anesthesia or bleeding clotting disorders    Review of Systems   Constitutional: Positive for activity change and unexpected weight change (lost trying vianey ). Negative for chills and fever.         No problems with anesthesia in patient or family  No known exposure to contagious or infectious diseases   HENT: Negative for congestion, dental problem (no loose teeth or caps in front ), nosebleeds and trouble swallowing. Eyes: Negative for visual disturbance. No glaucoma    Respiratory: Negative for apnea, cough, shortness of breath and wheezing. Copd and still smoking    Cardiovascular: Negative for chest pain, palpitations and leg swelling. No  Hx  Of MI, valvular heart disease, arrhythmia or chf    Gastrointestinal: Negative for abdominal pain, blood in stool, constipation, diarrhea, nausea and vomiting. No hepatitis or jaundice,  No hx of ulcer disease   Endocrine:        No hx of thyroid disease  Diabetes     Genitourinary: Negative for dysuria, frequency and hematuria. No hx of kidney disease   Bladder cancer requiring cystoscope yearly    Musculoskeletal: Positive for neck pain and neck stiffness. Negative for arthralgias, back pain and gait problem. Skin: Negative for rash and wound. Neurological: Negative for dizziness, tremors, seizures, syncope, speech difficulty, light-headedness, numbness and headaches. No hx of stroke or tia    Hematological: Does not bruise/bleed easily (no bleeding with previous surgeries). No hx of blood clots    Psychiatric/Behavioral: Positive for dysphoric mood (states is good ). Negative for suicidal ideas. The patient is not nervous/anxious. OBJECTIVE:  /60 (Site: Right Upper Arm)   Pulse 87   Temp 97.8 °F (36.6 °C) (Oral)   Resp 16   Ht 5' 10.5\" (1.791 m) Comment: WITHOUT SHOES  Wt 215 lb 3.2 oz (97.6 kg)   SpO2 96%   BMI 30.44 kg/m²      Body mass index is 30.44 kg/m². Physical Exam  Constitutional:       General: He is not in acute distress. Appearance: Normal appearance. He is well-developed. HENT:      Head: Normocephalic and atraumatic.       Right Ear: Tympanic membrane and ear canal normal.      Left Ear: Tympanic membrane and ear canal normal.      Nose: Nose normal. Mouth/Throat:      Pharynx: Oropharynx is clear. Eyes:      Conjunctiva/sclera: Conjunctivae normal.   Neck:      Thyroid: No thyromegaly. Vascular: No carotid bruit or JVD. Comments: Decreased rotation flexion and extension of neck  Cardiovascular:      Rate and Rhythm: Normal rate and regular rhythm. Heart sounds: Normal heart sounds. No murmur heard. No friction rub. No gallop. Comments:    Pulmonary:      Effort: Pulmonary effort is normal.      Breath sounds: Normal breath sounds. No wheezing. Chest:      Chest wall: No tenderness. Abdominal:      General: There is no distension. Palpations: Abdomen is soft. Tenderness: There is no abdominal tenderness. Comments: No hsm   Musculoskeletal:         General: No swelling. Cervical back: Neck supple. Tenderness present. Muscular tenderness (Posterior tenderness) present. Lymphadenopathy:      Cervical: No cervical adenopathy. Skin:     General: Skin is warm and dry. Neurological:      General: No focal deficit present. Mental Status: He is alert. Gait: Gait normal.      Comments:       Psychiatric:         Mood and Affect: Mood normal.         Behavior: Behavior normal.         Thought Content: Thought content normal.         ASSESSMENT/PLAN:    Janet Trotter was seen today for pre-op exam.    Diagnoses and all orders for this visit:    Pre-op examination labs to be done   -     EKG 12 Lead normal ekg     Cervical disc disease increased pain  Comments:  with left arm radiculopathy     Type 2 diabetes mellitus without complication, with long-term current use of insulin (HCC)  -     POCT glycosylated hemoglobin (Hb A1C)  Good at 6.7  -     Microalbumin / Creatinine Urine Ratio; Future    Chronic obstructive pulmonary disease, unspecified COPD type ([facility] [address] discussed tobacco and need to use his meds regular flory befor surgery   -     fluticasone-salmeterol (ADVAIR) 500-50 MCG/ACT AEPB diskus inhaler;  Inhale 1 puff into the lungs every 12 hours    Major depressive disorder with single episode, in partial remission (HCC) fair on cymbalta     Malignant neoplasm of dome of urinary bladder ([facility] [address] follows regularly with Dr Payal Walsh This Encounter   Medications    fluticasone-salmeterol (ADVAIR) 500-50 MCG/ACT AEPB diskus inhaler     Sig: Inhale 1 puff into the lungs every 12 hours     Dispense:  60 each     Refill:  3        No follow-ups on file. There are no Patient Instructions on file for this visit.

## 2022-05-17 LAB
CREATININE URINE: 60 MG/DL (ref 39–259)
MICROALBUMIN UR-MCNC: <1.2 MG/DL
MICROALBUMIN/CREAT UR-RTO: NORMAL MG/G (ref 0–30)

## 2022-05-19 ENCOUNTER — TELEPHONE (OUTPATIENT)
Dept: FAMILY MEDICINE CLINIC | Age: 52
End: 2022-05-19

## 2022-05-19 DIAGNOSIS — M50.00 DISC DISEASE WITH MYELOPATHY, CERVICAL: Primary | ICD-10-CM

## 2022-05-19 DIAGNOSIS — M50.90 CERVICAL DISC DISEASE: ICD-10-CM

## 2022-05-19 DIAGNOSIS — C67.1 MALIGNANT NEOPLASM OF DOME OF URINARY BLADDER (HCC): ICD-10-CM

## 2022-05-19 NOTE — TELEPHONE ENCOUNTER
Pt's ins card states no referral required. I put in referrals for each of the specialist and pt was advised.

## 2022-05-19 NOTE — TELEPHONE ENCOUNTER
Pt is needing referrals for the following b/c of his new insurance   Oxana Hugo   He has no apt set up just yet but will later on this year - yearly check up on bladder cancer. Dr. Shiloh Peng   Neuro surgeon at [address] [facility] [address] for 5/25/22 for a spinal fusion     Dr. Frances Oseguera was for 5/16/22 for nerve pain. Pt stated that the insurance told him that the referral can be back dated.      Pl advise   [phone]

## 2022-06-21 ENCOUNTER — TELEPHONE (OUTPATIENT)
Dept: FAMILY MEDICINE CLINIC | Age: 52
End: 2022-06-21

## 2022-06-21 NOTE — TELEPHONE ENCOUNTER
Patient calling for help, his pain management doctor fired him because he just had surgery and the surgeon gave him pain pills and that is not allowed. He is trying to find a new pain management to get in with asap.   He is asking if you can call in oxycodone 10 mg and he takes 4 a day to [facility] 5/16/22  NOV 7/12/22    Please call, [phone]

## 2022-06-22 NOTE — TELEPHONE ENCOUNTER
He would need an appointment  Cannot do pain meds without visit  /7 days at a time  Most I can do is 30 mg daily legally Will need tox screen also

## 2022-07-11 LAB
ALBUMIN SERPL-MCNC: 4.6 GM/DL (ref 3.5–5.2)
ALP BLD-CCNC: 98 U/L (ref 40–129)
ALT SERPL-CCNC: 16 U/L
ANION GAP SERPL CALCULATED.3IONS-SCNC: 11 MMOL/L (ref 7–16)
AST SERPL-CCNC: 16 U/L
BASOPHILS ABSOLUTE: 0.1 X10(3)/MCL (ref 0–0.1)
BASOPHILS RELATIVE PERCENT: 0.4 %
BILIRUB SERPL-MCNC: 0.5 MG/DL (ref 0.1–1.4)
BUN BLDV-MCNC: 9 MG/DL (ref 6–20)
CALCIUM SERPL-MCNC: 9.9 MG/DL (ref 8.6–10.4)
CHLORIDE BLD-SCNC: 102 MMOL/L (ref 98–107)
CO2: 30 MMOL/L (ref 22–29)
CREAT SERPL-MCNC: 0.94 MG/DL (ref 0.67–1.3)
CREATININE URINE: 222.3 MG/DL
EOSINOPHILS ABSOLUTE: 0.3 X10(3)/MCL (ref 0–0.5)
EOSINOPHILS RELATIVE PERCENT: 2.4 %
ESTIMATED AVERAGE GLUCOSE: 160 MG/DL
GFR, ESTIMATED: 98 ML/MIN/1.73 M2
GLUCOSE BLD-MCNC: 128 MG/DL (ref 74–100)
GRANULOCYTE IMMATURE ABS: 0.1 X10(3)/MCL (ref 0–0.1)
HBA1C MFR BLD: 7.2 % (ref 4.2–5.6)
HCT VFR BLD CALC: 46.3 % (ref 40–51)
HEMOGLOBIN: 15.4 G/DL (ref 13.7–17.5)
IMMATURE GRANULOCYTES: 0.4 %
LYMPHOCYTES ABSOLUTE: 45 %
LYMPHOCYTES ABSOLUTE: 5.3 X10(3)/MCL (ref 1.2–3.9)
MCH RBC QN AUTO: 31.8 PG (ref 26–34)
MCHC RBC AUTO-ENTMCNC: 33.3 G/DL (ref 30.7–35.5)
MCV RBC AUTO: 95.7 FL (ref 80–100)
MICROALBUMIN UR-MCNC: NORMAL MG/L
MICROALBUMIN/CREAT UR-RTO: NORMAL
MONOCYTES # BLD: 10.1 %
MONOCYTES ABSOLUTE: 1.2 X10(3)/MCL (ref 0.3–0.9)
NEUTROPHILS ABSOLUTE: 4.9 X10(3)/MCL (ref 1.6–6.1)
PDW BLD-RTO: 13.3 %
PLATELET # BLD: 143 X10(3)/MCL (ref 155–369)
PMV BLD AUTO: 12.2 FL (ref 8.8–12.5)
POTASSIUM SERPL-SCNC: 4.1 MMOL/L (ref 3.5–5)
RBC # BLD: 4.84 X10(6)/MCL (ref 4.6–6.1)
RBC # BLD: ABNORMAL NO UNITS
SEGMENTED NEUTROPHILS RELATIVE PERCENT: 41.7 %
SODIUM BLD-SCNC: 143 MMOL/L (ref 136–145)
TOTAL PROTEIN: 6.5 GM/DL (ref 6.4–8.3)
WBC # BLD: 11.9 X10(3)/MCL (ref 3.7–10.3)

## 2022-07-12 ENCOUNTER — OFFICE VISIT (OUTPATIENT)
Dept: FAMILY MEDICINE CLINIC | Age: 52
End: 2022-07-12
Payer: MEDICARE

## 2022-07-12 VITALS
TEMPERATURE: 97.9 F | DIASTOLIC BLOOD PRESSURE: 73 MMHG | HEART RATE: 83 BPM | OXYGEN SATURATION: 96 % | BODY MASS INDEX: 31.32 KG/M2 | HEIGHT: 70 IN | WEIGHT: 218.8 LBS | SYSTOLIC BLOOD PRESSURE: 108 MMHG

## 2022-07-12 DIAGNOSIS — C67.1 MALIGNANT NEOPLASM OF DOME OF URINARY BLADDER (HCC): ICD-10-CM

## 2022-07-12 DIAGNOSIS — J44.9 CHRONIC OBSTRUCTIVE PULMONARY DISEASE, UNSPECIFIED COPD TYPE (HCC): ICD-10-CM

## 2022-07-12 DIAGNOSIS — E11.9 TYPE 2 DIABETES MELLITUS WITHOUT COMPLICATION, WITH LONG-TERM CURRENT USE OF INSULIN (HCC): ICD-10-CM

## 2022-07-12 DIAGNOSIS — Z79.4 TYPE 2 DIABETES MELLITUS WITHOUT COMPLICATION, WITH LONG-TERM CURRENT USE OF INSULIN (HCC): ICD-10-CM

## 2022-07-12 DIAGNOSIS — F32.4 MAJOR DEPRESSIVE DISORDER WITH SINGLE EPISODE, IN PARTIAL REMISSION (HCC): ICD-10-CM

## 2022-07-12 DIAGNOSIS — M50.90 CERVICAL DISC DISEASE: ICD-10-CM

## 2022-07-12 DIAGNOSIS — G62.9 NEUROPATHY: Primary | ICD-10-CM

## 2022-07-12 DIAGNOSIS — G25.81 RLS (RESTLESS LEGS SYNDROME): ICD-10-CM

## 2022-07-12 LAB
BILIRUBIN, POC: NORMAL
BLOOD URINE, POC: NEGATIVE
CLARITY, POC: NORMAL
COLOR, POC: NORMAL
GLUCOSE URINE, POC: NEGATIVE
KETONES, POC: NEGATIVE
LEUKOCYTE EST, POC: NORMAL
NITRITE, POC: NEGATIVE
PH, POC: 5.5
PROTEIN, POC: NEGATIVE
SPECIFIC GRAVITY, POC: >=1.03
UROBILINOGEN, POC: NORMAL

## 2022-07-12 PROCEDURE — 81002 URINALYSIS NONAUTO W/O SCOPE: CPT | Performed by: INTERNAL MEDICINE

## 2022-07-12 PROCEDURE — 3051F HG A1C>EQUAL 7.0%<8.0%: CPT | Performed by: INTERNAL MEDICINE

## 2022-07-12 PROCEDURE — 99214 OFFICE O/P EST MOD 30 MIN: CPT | Performed by: INTERNAL MEDICINE

## 2022-07-12 RX ORDER — ROPINIROLE 1 MG/1
2 TABLET, FILM COATED ORAL NIGHTLY
Qty: 60 TABLET | Refills: 5 | Status: SHIPPED | OUTPATIENT
Start: 2022-07-12

## 2022-07-12 SDOH — ECONOMIC STABILITY: FOOD INSECURITY: WITHIN THE PAST 12 MONTHS, YOU WORRIED THAT YOUR FOOD WOULD RUN OUT BEFORE YOU GOT MONEY TO BUY MORE.: NEVER TRUE

## 2022-07-12 SDOH — ECONOMIC STABILITY: FOOD INSECURITY: WITHIN THE PAST 12 MONTHS, THE FOOD YOU BOUGHT JUST DIDN'T LAST AND YOU DIDN'T HAVE MONEY TO GET MORE.: NEVER TRUE

## 2022-07-12 ASSESSMENT — SOCIAL DETERMINANTS OF HEALTH (SDOH): HOW HARD IS IT FOR YOU TO PAY FOR THE VERY BASICS LIKE FOOD, HOUSING, MEDICAL CARE, AND HEATING?: NOT HARD AT ALL

## 2022-07-12 NOTE — PROGRESS NOTES
as needed for Erectile Dysfunction 30 tablet 3    blood glucose test strips (ASCENSIA AUTODISC VI;ONE TOUCH ULTRA TEST VI) strip Test Sugar Once Daily at Alternating Times of Day, DX: E11.9 100 each 3    gabapentin (NEURONTIN) 600 MG tablet Take 600 mg by mouth 3 times daily. oxyCODONE HCl (OXY-IR) 10 MG immediate release tablet Take 10 mg by mouth 4 times daily. ONETOUCH DELICA LANCETS 41Z MISC Test Sugar Once Daily at Alternating Times of Day, DX: E11.9 100 each 3     No current facility-administered medications for this visit. Social History     Socioeconomic History    Marital status:      Spouse name: Not on file    Number of children: Not on file    Years of education: Not on file    Highest education level: Not on file   Occupational History    Not on file   Tobacco Use    Smoking status: Every Day     Packs/day: 1.50     Years: 31.00     Pack years: 46.50     Types: Cigarettes    Smokeless tobacco: Never    Tobacco comments:     cutting down    Vaping Use    Vaping Use: Never used   Substance and Sexual Activity    Alcohol use: Yes     Comment: social    Drug use: No    Sexual activity: Yes     Partners: Female   Other Topics Concern    Not on file   Social History Narrative    Not on file     Social Determinants of Health     Financial Resource Strain: Low Risk     Difficulty of Paying Living Expenses: Not hard at all   Food Insecurity: No Food Insecurity    Worried About Running Out of Food in the Last Year: Never true    Ran Out of Food in the Last Year: Never true   Transportation Needs: No Transportation Needs    Lack of Transportation (Medical): No    Lack of Transportation (Non-Medical):  No   Physical Activity: Not on file   Stress: Not on file   Social Connections: Not on file   Intimate Partner Violence: Not on file   Housing Stability: Not on file      Past Medical History:   Diagnosis Date    Bladder carcinoma [facility])     sees Dr Mac Guzman yearly    Carpal tunnel syndrome, bilateral     Cervical disc disease     COPD (chronic obstructive pulmonary disease) (HCC)     mild but still smoking    Depression     Hyperlipidemia     Lumbar disc disease     Neck pain     Osteoarthritis     Type 2 diabetes mellitus, controlled [facility] 09/04/2018     Past Surgical History:   Procedure Laterality Date    BLADDER TUMOR EXCISION      CARPAL TUNNEL RELEASE Bilateral 06/2015    CERVICAL DISCECTOMY  11/18/2016    CERVICAL FUSION      CHOLECYSTECTOMY      KNEE SURGERY Right     TONSILLECTOMY         Review of Systems   Constitutional:  Positive for fatigue. HENT:  Negative for nosebleeds. Eyes:  Negative for visual disturbance. Respiratory:  Positive for shortness of breath (Chronic and mild). Negative for cough and wheezing. Cardiovascular:  Negative for chest pain, palpitations and leg swelling. Gastrointestinal:  Negative for abdominal pain, blood in stool, constipation, diarrhea, nausea and vomiting. Genitourinary:  Negative for difficulty urinating, frequency and hematuria. Musculoskeletal:  Positive for arthralgias, back pain and neck pain. Better   Neurological:  Negative for dizziness, light-headedness and headaches. Arm and hand neuropathic symptoms    Psychiatric/Behavioral:  Positive for dysphoric mood and sleep disturbance (restless legs). Negative for suicidal ideas. OBJECTIVE:  /73 (Site: Right Upper Arm, Position: Sitting, Cuff Size: Medium Adult)   Pulse 83   Temp 97.9 °F (36.6 °C) (Oral)   Ht 5' 10\" (1.778 m)   Wt 218 lb 12.8 oz (99.2 kg)   SpO2 96%   BMI 31.39 kg/m²      Body mass index is 31.39 kg/m². Physical Exam  Constitutional:       General: He is not in acute distress. Appearance: Normal appearance. He is well-developed. HENT:      Head: Normocephalic and atraumatic. Mouth/Throat:      Pharynx: Oropharynx is clear. Eyes:      Conjunctiva/sclera: Conjunctivae normal.   Neck:      Thyroid: No thyromegaly. Vascular: No carotid bruit or JVD. Comments: Decreased rotation flexion and extension of neck  Cardiovascular:      Rate and Rhythm: Normal rate and regular rhythm. Heart sounds: Normal heart sounds. No murmur heard. No friction rub. No gallop. Comments: Normal dp pulses bilaterally    Pulmonary:      Effort: Pulmonary effort is normal.      Breath sounds: Normal breath sounds. No wheezing. Chest:      Chest wall: No tenderness. Abdominal:      General: There is no distension. Palpations: Abdomen is soft. Tenderness: There is no abdominal tenderness. Comments: No hsm   Musculoskeletal:         General: Tenderness (toughing his left arm  minimal brushing touch) present. No swelling or deformity (of feet). Cervical back: Neck supple. Tenderness present. Muscular tenderness (Posterior tenderness) present. Lymphadenopathy:      Cervical: No cervical adenopathy. Skin:     Comments: No color change or heat/cold to touch   Neurological:      General: No focal deficit present. Mental Status: He is alert. Gait: Gait normal.      Comments: Normal sensation to micral filament  feet       Psychiatric:         Behavior: Behavior normal.         Thought Content: Thought content normal.      Comments: Mildly  depressed       ASSESSMENT/PLAN:    Nisreen Weiss was seen today for follow-up. Diagnoses and all orders for this visit:    Neuropathy worse LUE  question from his neck per patient EMG done and not CTS  To follow up with pain doctors and NS  On gabapentin  and cymbalta    Cervical disc disease better post operatively     RLS (restless legs syndrome) increase dose on gabapentin  If worsening to see neuro   -     rOPINIRole (REQUIP) 1 MG tablet; Take 2 tablets by mouth nightly    Type 2 diabetes mellitus without complication, with long-term current use of insulin (HCC)slightly up with hga1c 7.2  -     Diabetic Foot Exam  -     dapagliflozin (FARXIGA) 10 MG tablet;  Take 1 tablet by mouth every morning  -     POCT Urinalysis no Micro    Chronic obstructive pulmonary disease, unspecified COPD type (HCC) stable still smoking and discussed     Major depressive disorder with single episode, in partial remission ([facility] stable on meds     Malignant neoplasm of dome of urinary bladder ([facility]  Comments:  urine alright and following with dr Alexandro Moreno has upcoming appointment in the fall       Orders Placed This Encounter   Medications    dapagliflozin (FARXIGA) 10 MG tablet     Sig: Take 1 tablet by mouth every morning     Dispense:  30 tablet     Refill:  5    rOPINIRole (REQUIP) 1 MG tablet     Sig: Take 2 tablets by mouth nightly     Dispense:  60 tablet     Refill:  5        Return in about 3 months (around 10/12/2022), or if symptoms worsen or fail to improve, for medicare wellness and diabetes follow up . There are no Patient Instructions on file for this visit.

## 2022-07-16 ASSESSMENT — ENCOUNTER SYMPTOMS
BACK PAIN: 1
CONSTIPATION: 0
VOMITING: 0
DIARRHEA: 0
BLOOD IN STOOL: 0
COUGH: 0
ABDOMINAL PAIN: 0
SHORTNESS OF BREATH: 1
NAUSEA: 0
WHEEZING: 0

## 2022-09-06 RX ORDER — ATORVASTATIN CALCIUM 40 MG/1
TABLET, FILM COATED ORAL
Qty: 90 TABLET | Refills: 0 | Status: SHIPPED | OUTPATIENT
Start: 2022-09-06

## 2022-09-06 RX ORDER — METFORMIN HYDROCHLORIDE 500 MG/1
TABLET, EXTENDED RELEASE ORAL
Qty: 360 TABLET | Refills: 0 | Status: SHIPPED | OUTPATIENT
Start: 2022-09-06

## 2022-09-23 DIAGNOSIS — M50.00 DISC DISEASE WITH MYELOPATHY, CERVICAL: ICD-10-CM

## 2022-09-23 DIAGNOSIS — F32.4 MAJOR DEPRESSIVE DISORDER WITH SINGLE EPISODE, IN PARTIAL REMISSION (HCC): ICD-10-CM

## 2022-09-23 RX ORDER — DULOXETIN HYDROCHLORIDE 60 MG/1
CAPSULE, DELAYED RELEASE ORAL
Qty: 90 CAPSULE | Refills: 0 | Status: SHIPPED | OUTPATIENT
Start: 2022-09-23

## 2022-11-23 ENCOUNTER — TELEMEDICINE (OUTPATIENT)
Dept: FAMILY MEDICINE CLINIC | Age: 52
End: 2022-11-23
Payer: MEDICARE

## 2022-11-23 DIAGNOSIS — U07.1 COVID-19: Primary | ICD-10-CM

## 2022-11-23 PROCEDURE — 99213 OFFICE O/P EST LOW 20 MIN: CPT | Performed by: INTERNAL MEDICINE

## 2022-11-23 RX ORDER — NIRMATRELVIR AND RITONAVIR 300-100 MG
KIT ORAL
Qty: 30 TABLET | Refills: 0 | Status: SHIPPED | OUTPATIENT
Start: 2022-11-23 | End: 2022-11-28

## 2022-11-23 ASSESSMENT — ENCOUNTER SYMPTOMS
RHINORRHEA: 1
SHORTNESS OF BREATH: 0
COUGH: 1

## 2022-11-23 NOTE — PROGRESS NOTES
2022    TELEHEALTH EVALUATION -- Audio/Visual (During LMZOP-92 public health emergency)    HPI:    Tiffanie Barnes (:  1970) has requested an audio/video evaluation for the following concern(s):    Chief Complaint   Patient presents with    Positive For Covid-19     Ph. [phone]. Patient [place]. Patient c/o productive cough, fever, sweats, chills since last night. Patient denies sore throat, congestion.   Patient tested Positive for Covid-19 today    Tiffanie Barnes is a 46 y.o. male with the following history as recorded in [facility]:  Patient Active Problem List    Diagnosis Date Noted    Hypersomnia     Nocturnal hypoxemia     Type 2 diabetes mellitus without complication, with long-term current use of insulin ([facility] 10/18/2019    Mixed hyperlipidemia 10/18/2019    Cervical post-laminectomy syndrome 10/08/2019    Lumbar spondylosis 10/08/2019    Neuropathy 09/15/2018    Hypercholesteremia 09/15/2018    Major depressive disorder with single episode, in partial remission ([address] 2017    Smoker 2017    Congenital anomaly of larynx 2016    Malignant neoplasm of dome of urinary bladder ([address] 2016    Disc disease with myelopathy, cervical 2016    Post-traumatic osteoarthritis of knee 2016    Chronic obstructive pulmonary disease ([address] 2016    Chronic pain syndrome 2016    Prepatellar bursitis of right knee 10/11/2012     Current Outpatient Medications   Medication Sig Dispense Refill    DULoxetine (CYMBALTA) 60 MG extended release capsule Take 1 capsule by mouth once daily 90 capsule 0    metFORMIN (GLUCOPHAGE-XR) 500 MG extended release tablet TAKE 4 TABLETS BY MOUTH ONCE DAILY WITH BREAKFAST 360 tablet 0    atorvastatin (LIPITOR) 40 MG tablet Take 1 tablet by mouth once daily 90 tablet 0    rOPINIRole (REQUIP) 1 MG tablet Take 2 tablets by mouth nightly 60 tablet 5    cyclobenzaprine (FLEXERIL) 10 MG tablet Take 10 mg by mouth 3 times daily as needed      docusate sodium (COLACE) 100 MG capsule Take 100 mg by mouth daily      fluticasone-salmeterol (ADVAIR) 500-50 MCG/ACT AEPB diskus inhaler Inhale 1 puff into the lungs every 12 hours 60 each 3    albuterol sulfate  (90 Base) MCG/ACT inhaler INHALE 2 PUFFS BY MOUTH 4 TIMES DAILY AS NEEDED FOR WHEEZING 9 g 0    blood glucose test strips (ASCENSIA AUTODISC VI;ONE TOUCH ULTRA TEST VI) strip Test Sugar Once Daily at Alternating Times of Day, DX: E11.9 100 each 3    gabapentin (NEURONTIN) 600 MG tablet Take 600 mg by mouth 3 times daily. oxyCODONE HCl (OXY-IR) 10 MG immediate release tablet Take 10 mg by mouth 4 times daily. ONETOUCH DELICA LANCETS 40R MISC Test Sugar Once Daily at Alternating Times of Day, DX: E11.9 100 each 3    dapagliflozin (FARXIGA) 10 MG tablet Take 1 tablet by mouth every morning (Patient not taking: Reported on 11/23/2022) 30 tablet 5    vardenafil (LEVITRA) 20 MG tablet Take 1 tablet by mouth as needed for Erectile Dysfunction 30 tablet 3     No current facility-administered medications for this visit.      Allergies: Lyrica [pregabalin] and Wellbutrin [bupropion]  Past Medical History:   Diagnosis Date    Bladder carcinoma [facility])     sees Dr Amara Melgar yearly    Carpal tunnel syndrome, bilateral     Cervical disc disease     COPD (chronic obstructive pulmonary disease) (HCC)     mild but still smoking    Depression     Hyperlipidemia     Lumbar disc disease     Neck pain     Osteoarthritis     Type 2 diabetes mellitus, controlled ([facility] 09/04/2018     Past Surgical History:   Procedure Laterality Date    BLADDER TUMOR EXCISION      CARPAL TUNNEL RELEASE Bilateral 06/2015    CERVICAL DISCECTOMY  11/18/2016    CERVICAL FUSION      CHOLECYSTECTOMY      KNEE SURGERY Right     TONSILLECTOMY       Family History   Problem Relation Age of Onset    COPD Mother     Depression Father         manic    Coronary Art Dis Maternal Grandfather     Cancer Maternal Grandfather         skin cancer     Diabetes Paternal Grandmother     Cancer Maternal Uncle         does not know the type     No Known Problems Paternal Grandfather      Social History     Tobacco Use    Smoking status: Every Day     Packs/day: 1.50     Years: 31.00     Pack years: 46.50     Types: Cigarettes    Smokeless tobacco: Never    Tobacco comments:     cutting down    Substance Use Topics    Alcohol use: Yes     Comment: social        Review of Systems   Constitutional:  Positive for chills and fever. HENT:  Positive for congestion, postnasal drip and rhinorrhea. Respiratory:  Positive for cough. Negative for shortness of breath. Cardiovascular:  Negative for chest pain and palpitations. Genitourinary:  Negative for dysuria and frequency. Neurological:  Negative for dizziness and headaches. Prior to Visit Medications    Medication Sig Taking?  Authorizing Provider   DULoxetine (CYMBALTA) 60 MG extended release capsule Take 1 capsule by mouth once daily Yes THOMAS Villavicencio NP   metFORMIN (GLUCOPHAGE-XR) 500 MG extended release tablet TAKE 4 TABLETS BY MOUTH ONCE DAILY WITH BREAKFAST Yes THOMAS Villavicencio NP   atorvastatin (LIPITOR) 40 MG tablet Take 1 tablet by mouth once daily Yes THOMAS Villavicencio NP   rOPINIRole (REQUIP) 1 MG tablet Take 2 tablets by mouth nightly Yes Otilia Banks MD   cyclobenzaprine (FLEXERIL) 10 MG tablet Take 10 mg by mouth 3 times daily as needed Yes Historical Provider, MD   docusate sodium (COLACE) 100 MG capsule Take 100 mg by mouth daily Yes Historical Provider, MD   fluticasone-salmeterol (ADVAIR) 500-50 MCG/ACT AEPB diskus inhaler Inhale 1 puff into the lungs every 12 hours Yes Otilia Banks MD   albuterol sulfate  (90 Base) MCG/ACT inhaler INHALE 2 PUFFS BY MOUTH 4 TIMES DAILY AS NEEDED FOR WHEEZING Yes THOMAS Villavicencio NP   blood glucose test strips (ASCENSIA AUTODISC VI;ONE TOUCH ULTRA TEST VI) strip Test Sugar Once Daily at Alternating Times of Day, DX: E11.9 Yes Conchis Garcia MD   gabapentin (NEURONTIN) 600 MG tablet Take 600 mg by mouth 3 times daily. Yes Historical Provider, MD   oxyCODONE HCl (OXY-IR) 10 MG immediate release tablet Take 10 mg by mouth 4 times daily. Yes Historical Provider, MD Rahel Escobar LANCETS 30I MISC Test Sugar Once Daily at Alternating Times of Day, DX: E11.9 Yes Conchis Garcia MD   dapagliflozin (FARXIGA) 10 MG tablet Take 1 tablet by mouth every morning  Patient not taking: Reported on 11/23/2022  Conchis Garcia MD   vardenafil (LEVITRA) 20 MG tablet Take 1 tablet by mouth as needed for Erectile Dysfunction  Conchis Garcia MD       Social History     Tobacco Use    Smoking status: Every Day     Packs/day: 1.50     Years: 31.00     Pack years: 46.50     Types: Cigarettes    Smokeless tobacco: Never    Tobacco comments:     cutting down    Vaping Use    Vaping Use: Never used   Substance Use Topics    Alcohol use: Yes     Comment: social    Drug use: No            PHYSICAL EXAMINATION:  [ INSTRUCTIONS:  \"[x]\" Indicates a positive item  \"[]\" Indicates a negative item  -- DELETE ALL ITEMS NOT EXAMINED]  Vital Signs: (As obtained by patient/caregiver or practitioner observation)    Blood pressure-  Heart rate-    Respiratory rate- 12   Temperature-  Pulse oximetry-     Constitutional: [x] Appears well-developed and well-nourished [x] No apparent distress      [] Abnormal-   Mental status  [x] Alert and awake  [x] Oriented to person/place/time []Able to follow commands      Eyes:  EOM    []  Normal  [] Abnormal-  Sclera  [x]  Normal  [] Abnormal -         Discharge []  None visible  [] Abnormal -    HENT:   [x] Normocephalic, atraumatic.   [] Abnormal   [] Mouth/Throat: Mucous membranes are moist.     External Ears [x] Normal  [] Abnormal-     Neck: [x] No visualized mass     Pulmonary/Chest: [x] Respiratory effort normal.  [x] No visualized signs of difficulty breathing or respiratory distress        [] Abnormal-      Musculoskeletal:   [] Normal gait with no signs of ataxia         [x] Normal range of motion of neck        [] Abnormal-       Neurological:        [] No Facial Asymmetry (Cranial nerve 7 motor function) (limited exam to video visit)          [x] No gaze palsy        [] Abnormal-         Skin:        [x] No significant exanthematous lesions or discoloration noted on facial skin         [] Abnormal-            Psychiatric:       [x] Normal Affect [] No Hallucinations        [] Abnormal-     Other pertinent observable physical exam findings-     ASSESSMENT/PLAN:   Diagnosis Orders   1. COVID-19           Outpatient Encounter Medications as of 11/23/2022   Medication Sig Dispense Refill    nirmatrelvir/ritonavir (PAXLOVID, 300/100,) 20 x 150 MG & 10 x 100MG TBPK Take 3 tablets (two 150 mg nirmatrelvir and one 100 mg ritonavir tablets) by mouth every 12 hours for 5 days.  30 tablet 0    DULoxetine (CYMBALTA) 60 MG extended release capsule Take 1 capsule by mouth once daily 90 capsule 0    metFORMIN (GLUCOPHAGE-XR) 500 MG extended release tablet TAKE 4 TABLETS BY MOUTH ONCE DAILY WITH BREAKFAST 360 tablet 0    atorvastatin (LIPITOR) 40 MG tablet Take 1 tablet by mouth once daily 90 tablet 0    rOPINIRole (REQUIP) 1 MG tablet Take 2 tablets by mouth nightly 60 tablet 5    cyclobenzaprine (FLEXERIL) 10 MG tablet Take 10 mg by mouth 3 times daily as needed      docusate sodium (COLACE) 100 MG capsule Take 100 mg by mouth daily      fluticasone-salmeterol (ADVAIR) 500-50 MCG/ACT AEPB diskus inhaler Inhale 1 puff into the lungs every 12 hours 60 each 3    albuterol sulfate  (90 Base) MCG/ACT inhaler INHALE 2 PUFFS BY MOUTH 4 TIMES DAILY AS NEEDED FOR WHEEZING 9 g 0    blood glucose test strips (ASCENSIA AUTODISC VI;ONE TOUCH ULTRA TEST VI) strip Test Sugar Once Daily at Alternating Times of Day, DX: E11.9 100 each 3    gabapentin (NEURONTIN) 600 MG tablet Take 600 mg by mouth 3 times daily. oxyCODONE HCl (OXY-IR) 10 MG immediate release tablet Take 10 mg by mouth 4 times daily. ONETOUCH DELICA LANCETS 23X MISC Test Sugar Once Daily at Alternating Times of Day, DX: E11.9 100 each 3    dapagliflozin (FARXIGA) 10 MG tablet Take 1 tablet by mouth every morning (Patient not taking: Reported on 11/23/2022) 30 tablet 5    vardenafil (LEVITRA) 20 MG tablet Take 1 tablet by mouth as needed for Erectile Dysfunction 30 tablet 3     No facility-administered encounter medications on file as of 11/23/2022. No orders of the defined types were placed in this encounter. Patient instructed no to take lipitor . Levitra or advair while on paxlovid . Use albuterol . To er if any sob develops     Francy Jones, was evaluated through a synchronous (real-time) audio-video encounter. The patient (or guardian if applicable) is aware that this is a billable service, which includes applicable co-pays. This Virtual Visit was conducted with patient's (and/or legal guardian's) consent. The visit was conducted pursuant to the emergency declaration under the [address], [facility] authority and the [employer] and [employer] General Act. Patient identification was verified, and a caregiver was present when appropriate. The patient was located at [facility] . Provider was located at [facility] (Appt Dept): [address] [facility],[address],  [facility]. Total time spent on this encounter: Not billed by time    --Gary Yoon DO on 11/23/2022 at 12:52 PM    An electronic signature was used to authenticate this note.

## 2022-11-23 NOTE — PATIENT INSTRUCTIONS
Thank you for choosing [facility]. Please bring a current list of medications to every appointment. Please contact your pharmacy for any prescription refill(s) that you are requesting.

## 2022-12-16 RX ORDER — METFORMIN HYDROCHLORIDE 500 MG/1
TABLET, EXTENDED RELEASE ORAL
Qty: 360 TABLET | Refills: 0 | Status: SHIPPED | OUTPATIENT
Start: 2022-12-16

## 2023-01-06 DIAGNOSIS — M50.00 DISC DISEASE WITH MYELOPATHY, CERVICAL: ICD-10-CM

## 2023-01-06 DIAGNOSIS — F32.4 MAJOR DEPRESSIVE DISORDER WITH SINGLE EPISODE, IN PARTIAL REMISSION (HCC): ICD-10-CM

## 2023-01-06 RX ORDER — ATORVASTATIN CALCIUM 40 MG/1
TABLET, FILM COATED ORAL
Qty: 90 TABLET | Refills: 0 | Status: SHIPPED | OUTPATIENT
Start: 2023-01-06

## 2023-01-06 RX ORDER — DULOXETIN HYDROCHLORIDE 60 MG/1
CAPSULE, DELAYED RELEASE ORAL
Qty: 90 CAPSULE | Refills: 0 | Status: SHIPPED | OUTPATIENT
Start: 2023-01-06

## 2023-02-06 DIAGNOSIS — G25.81 RLS (RESTLESS LEGS SYNDROME): ICD-10-CM

## 2023-02-06 RX ORDER — ROPINIROLE 1 MG/1
2 TABLET, FILM COATED ORAL NIGHTLY
Qty: 60 TABLET | Refills: 2 | Status: SHIPPED | OUTPATIENT
Start: 2023-02-06

## 2023-03-13 RX ORDER — METFORMIN HYDROCHLORIDE 500 MG/1
TABLET, EXTENDED RELEASE ORAL
Qty: 120 TABLET | Refills: 2 | Status: SHIPPED | OUTPATIENT
Start: 2023-03-13

## 2023-04-16 DIAGNOSIS — M50.00 DISC DISEASE WITH MYELOPATHY, CERVICAL: ICD-10-CM

## 2023-04-16 DIAGNOSIS — E78.00 HYPERCHOLESTEREMIA: ICD-10-CM

## 2023-04-16 DIAGNOSIS — E11.9 TYPE 2 DIABETES MELLITUS WITHOUT COMPLICATION, WITH LONG-TERM CURRENT USE OF INSULIN (HCC): Primary | ICD-10-CM

## 2023-04-16 DIAGNOSIS — Z79.4 TYPE 2 DIABETES MELLITUS WITHOUT COMPLICATION, WITH LONG-TERM CURRENT USE OF INSULIN (HCC): Primary | ICD-10-CM

## 2023-04-16 DIAGNOSIS — F32.4 MAJOR DEPRESSIVE DISORDER WITH SINGLE EPISODE, IN PARTIAL REMISSION (HCC): ICD-10-CM

## 2023-04-16 DIAGNOSIS — Z51.81 MEDICATION MONITORING ENCOUNTER: ICD-10-CM

## 2023-04-17 RX ORDER — ATORVASTATIN CALCIUM 40 MG/1
TABLET, FILM COATED ORAL
Qty: 90 TABLET | Refills: 0 | Status: SHIPPED | OUTPATIENT
Start: 2023-04-17

## 2023-04-17 RX ORDER — DULOXETIN HYDROCHLORIDE 60 MG/1
CAPSULE, DELAYED RELEASE ORAL
Qty: 90 CAPSULE | Refills: 0 | Status: SHIPPED | OUTPATIENT
Start: 2023-04-17

## 2023-05-07 DIAGNOSIS — G25.81 RLS (RESTLESS LEGS SYNDROME): ICD-10-CM

## 2023-05-08 RX ORDER — ROPINIROLE 1 MG/1
2 TABLET, FILM COATED ORAL NIGHTLY
Qty: 60 TABLET | Refills: 0 | Status: SHIPPED | OUTPATIENT
Start: 2023-05-08

## 2023-05-17 ENCOUNTER — TELEPHONE (OUTPATIENT)
Dept: FAMILY MEDICINE CLINIC | Age: 53
End: 2023-05-17

## 2023-05-17 RX ORDER — AMOXICILLIN 875 MG/1
875 TABLET, COATED ORAL 2 TIMES DAILY
Qty: 20 TABLET | Refills: 0 | Status: SHIPPED | OUTPATIENT
Start: 2023-05-17 | End: 2023-05-27

## 2023-05-17 NOTE — TELEPHONE ENCOUNTER
Geraldine patient wife calling for him, he broke a piece of his lower front tooth off, it is now infected, he is having a hard time talking. They are asking if you would please send in antibiotics to [facility]. They are trying to find a dentist that can get him in soon.       Please call, [phone]

## 2023-05-17 NOTE — TELEPHONE ENCOUNTER
Spoke with Stephen Lewis pt's tooth broke off 2 days ago, it's painful and today he has left sided facial swelling. Recommended going to one of those urgent dental care but they don't know who takes his ins and still needs to call and find out.

## 2023-05-21 DIAGNOSIS — M50.00 DISC DISEASE WITH MYELOPATHY, CERVICAL: ICD-10-CM

## 2023-05-21 DIAGNOSIS — F32.4 MAJOR DEPRESSIVE DISORDER WITH SINGLE EPISODE, IN PARTIAL REMISSION (HCC): ICD-10-CM

## 2023-05-22 LAB
ALBUMIN SERPL-MCNC: 4.6 GM/DL (ref 3.5–5.2)
ALP BLD-CCNC: 90 U/L (ref 40–129)
ALT SERPL-CCNC: 21 U/L
ANION GAP SERPL CALCULATED.3IONS-SCNC: 12 MMOL/L (ref 7–16)
AST SERPL-CCNC: 16 U/L
BASOPHILS ABSOLUTE: 0.1 X10(3)/MCL (ref 0–0.1)
BASOPHILS RELATIVE PERCENT: 0.4 %
BILIRUB SERPL-MCNC: 0.5 MG/DL (ref 0.1–1.4)
BUN BLDV-MCNC: 10 MG/DL (ref 6–20)
CALCIUM SERPL-MCNC: 10.2 MG/DL (ref 8.6–10.4)
CHLORIDE BLD-SCNC: 99 MMOL/L (ref 98–107)
CO2: 27 MMOL/L (ref 22–29)
CREAT SERPL-MCNC: 0.92 MG/DL (ref 0.67–1.3)
CREATININE URINE: 193.4 MG/DL
EOSINOPHILS ABSOLUTE: 0.3 X10(3)/MCL (ref 0–0.5)
EOSINOPHILS RELATIVE PERCENT: 2.2 %
ESTIMATED AVERAGE GLUCOSE: 157 MG/DL
GFR, ESTIMATED: 100 ML/MIN/1.73 M2
GLUCOSE BLD-MCNC: 112 MG/DL (ref 74–100)
GRANULOCYTE IMMATURE ABS: 0 X10(3)/MCL (ref 0–0.1)
HBA1C MFR BLD: 7.1 % (ref 4.2–5.6)
HCT VFR BLD CALC: 49 % (ref 40–51)
HEMOGLOBIN: 16.2 G/DL (ref 13.7–17.5)
IMMATURE GRANULOCYTES: 0.4 %
LYMPHOCYTES ABSOLUTE: 3.8 X10(3)/MCL (ref 1.2–3.9)
LYMPHOCYTES ABSOLUTE: 33.9 %
MCH RBC QN AUTO: 31.8 PG (ref 26–34)
MCHC RBC AUTO-ENTMCNC: 33.1 G/DL (ref 30.7–35.5)
MCV RBC AUTO: 96.3 FL (ref 80–100)
MICROALBUMIN UR-MCNC: 13.5 MG/L
MICROALBUMIN/CREAT UR-RTO: 7 MG/G (ref 0–30)
MONOCYTES # BLD: 9.5 %
MONOCYTES ABSOLUTE: 1.1 X10(3)/MCL (ref 0.3–0.9)
NEUTROPHILS ABSOLUTE: 6.1 X10(3)/MCL (ref 1.6–6.1)
PDW BLD-RTO: 13.1 %
PLATELET # BLD: 183 X10(3)/MCL (ref 155–369)
PMV BLD AUTO: 12 FL (ref 8.8–12.5)
POTASSIUM SERPL-SCNC: 3.6 MMOL/L (ref 3.5–5)
RBC # BLD: 5.09 X10(6)/MCL (ref 4.6–6.1)
SEGMENTED NEUTROPHILS RELATIVE PERCENT: 53.6 %
SODIUM BLD-SCNC: 138 MMOL/L (ref 136–145)
TOTAL PROTEIN: 7.7 GM/DL (ref 6.4–8.3)
WBC # BLD: 11.3 X10(3)/MCL (ref 3.7–10.3)

## 2023-05-22 RX ORDER — ATORVASTATIN CALCIUM 40 MG/1
TABLET, FILM COATED ORAL
Qty: 90 TABLET | Refills: 0 | Status: SHIPPED | OUTPATIENT
Start: 2023-05-22

## 2023-05-22 RX ORDER — DULOXETIN HYDROCHLORIDE 60 MG/1
CAPSULE, DELAYED RELEASE ORAL
Qty: 90 CAPSULE | Refills: 0 | Status: SHIPPED | OUTPATIENT
Start: 2023-05-22

## 2023-05-23 ENCOUNTER — OFFICE VISIT (OUTPATIENT)
Dept: FAMILY MEDICINE CLINIC | Age: 53
End: 2023-05-23
Payer: MEDICARE

## 2023-05-23 VITALS
TEMPERATURE: 97.8 F | SYSTOLIC BLOOD PRESSURE: 114 MMHG | DIASTOLIC BLOOD PRESSURE: 72 MMHG | BODY MASS INDEX: 30.06 KG/M2 | WEIGHT: 210 LBS | HEART RATE: 84 BPM | OXYGEN SATURATION: 95 % | HEIGHT: 70 IN

## 2023-05-23 DIAGNOSIS — M25.551 RIGHT HIP PAIN: Primary | ICD-10-CM

## 2023-05-23 DIAGNOSIS — F17.200 SMOKER: ICD-10-CM

## 2023-05-23 DIAGNOSIS — G89.4 CHRONIC PAIN SYNDROME: ICD-10-CM

## 2023-05-23 DIAGNOSIS — F17.219 CIGARETTE NICOTINE DEPENDENCE WITH NICOTINE-INDUCED DISORDER: ICD-10-CM

## 2023-05-23 DIAGNOSIS — J44.9 CHRONIC OBSTRUCTIVE PULMONARY DISEASE, UNSPECIFIED COPD TYPE (HCC): ICD-10-CM

## 2023-05-23 DIAGNOSIS — E78.00 HYPERCHOLESTEREMIA: ICD-10-CM

## 2023-05-23 DIAGNOSIS — E11.9 TYPE 2 DIABETES MELLITUS WITHOUT COMPLICATION, WITH LONG-TERM CURRENT USE OF INSULIN (HCC): ICD-10-CM

## 2023-05-23 DIAGNOSIS — Z79.4 TYPE 2 DIABETES MELLITUS WITHOUT COMPLICATION, WITH LONG-TERM CURRENT USE OF INSULIN (HCC): ICD-10-CM

## 2023-05-23 PROCEDURE — 99214 OFFICE O/P EST MOD 30 MIN: CPT | Performed by: INTERNAL MEDICINE

## 2023-05-23 PROCEDURE — G8417 CALC BMI ABV UP PARAM F/U: HCPCS | Performed by: INTERNAL MEDICINE

## 2023-05-23 PROCEDURE — G8427 DOCREV CUR MEDS BY ELIG CLIN: HCPCS | Performed by: INTERNAL MEDICINE

## 2023-05-23 PROCEDURE — 3023F SPIROM DOC REV: CPT | Performed by: INTERNAL MEDICINE

## 2023-05-23 PROCEDURE — 2022F DILAT RTA XM EVC RTNOPTHY: CPT | Performed by: INTERNAL MEDICINE

## 2023-05-23 PROCEDURE — 3051F HG A1C>EQUAL 7.0%<8.0%: CPT | Performed by: INTERNAL MEDICINE

## 2023-05-23 PROCEDURE — 4004F PT TOBACCO SCREEN RCVD TLK: CPT | Performed by: INTERNAL MEDICINE

## 2023-05-23 PROCEDURE — 3017F COLORECTAL CA SCREEN DOC REV: CPT | Performed by: INTERNAL MEDICINE

## 2023-05-23 RX ORDER — OXYCODONE HYDROCHLORIDE 10 MG/1
10 TABLET ORAL EVERY 6 HOURS PRN
Qty: 120 TABLET | Refills: 0
Start: 2023-05-23 | End: 2023-06-22

## 2023-05-23 RX ORDER — LANCETS 30 GAUGE
1 EACH MISCELLANEOUS DAILY
Qty: 100 EACH | Refills: 1 | Status: SHIPPED | OUTPATIENT
Start: 2023-05-23

## 2023-05-23 RX ORDER — PRENATAL VIT 91/IRON/FOLIC/DHA 28-975-200
COMBINATION PACKAGE (EA) ORAL
Qty: 15 G | Refills: 1 | Status: SHIPPED | OUTPATIENT
Start: 2023-05-23

## 2023-05-23 RX ORDER — GABAPENTIN 800 MG/1
800 TABLET ORAL 3 TIMES DAILY
Qty: 90 TABLET | Refills: 3
Start: 2023-05-23 | End: 2023-08-21

## 2023-05-23 RX ORDER — FLUTICASONE PROPIONATE AND SALMETEROL 500; 50 UG/1; UG/1
1 POWDER RESPIRATORY (INHALATION) EVERY 12 HOURS
Qty: 60 EACH | Refills: 5 | Status: SHIPPED | OUTPATIENT
Start: 2023-05-23

## 2023-05-23 ASSESSMENT — PATIENT HEALTH QUESTIONNAIRE - PHQ9
3. TROUBLE FALLING OR STAYING ASLEEP: 0
10. IF YOU CHECKED OFF ANY PROBLEMS, HOW DIFFICULT HAVE THESE PROBLEMS MADE IT FOR YOU TO DO YOUR WORK, TAKE CARE OF THINGS AT HOME, OR GET ALONG WITH OTHER PEOPLE: 0
1. LITTLE INTEREST OR PLEASURE IN DOING THINGS: 0
SUM OF ALL RESPONSES TO PHQ QUESTIONS 1-9: 4
8. MOVING OR SPEAKING SO SLOWLY THAT OTHER PEOPLE COULD HAVE NOTICED. OR THE OPPOSITE, BEING SO FIGETY OR RESTLESS THAT YOU HAVE BEEN MOVING AROUND A LOT MORE THAN USUAL: 0
5. POOR APPETITE OR OVEREATING: 0
SUM OF ALL RESPONSES TO PHQ QUESTIONS 1-9: 4
SUM OF ALL RESPONSES TO PHQ QUESTIONS 1-9: 4
SUM OF ALL RESPONSES TO PHQ9 QUESTIONS 1 & 2: 0
4. FEELING TIRED OR HAVING LITTLE ENERGY: 3
6. FEELING BAD ABOUT YOURSELF - OR THAT YOU ARE A FAILURE OR HAVE LET YOURSELF OR YOUR FAMILY DOWN: 0
7. TROUBLE CONCENTRATING ON THINGS, SUCH AS READING THE NEWSPAPER OR WATCHING TELEVISION: 1
9. THOUGHTS THAT YOU WOULD BE BETTER OFF DEAD, OR OF HURTING YOURSELF: 0
2. FEELING DOWN, DEPRESSED OR HOPELESS: 0
SUM OF ALL RESPONSES TO PHQ QUESTIONS 1-9: 4

## 2023-05-23 NOTE — PROGRESS NOTES
evening    Hypercholesteremia  Right hip pain  -     XR HIP 2-3 VW W PELVIS RIGHT; Future    Chronic pain syndrome from pain management and reviewed oarrs  -     oxyCODONE HCl (OXY-IR) 10 MG immediate release tablet; Take 1 tablet by mouth every 6 hours as needed for Pain for up to 30 days. Intended supply: 30 days per pain management Max Daily Amount: 40 mg  -     gabapentin (NEURONTIN) 800 MG tablet; Take 1 tablet by mouth 3 times daily for 90 days. Cigarette nicotine dependence with nicotine-induced disorder  -     CT Lung Screen (Initial or Annual); Future    Smoker discussed cessation     Bladder cancer  following with Dr Cory Eid and per patient and wife getting cystoscopies and have been fine       . Orders Placed This Encounter   Medications    gabapentin (NEURONTIN) 800 MG tablet     Sig: Take 1 tablet by mouth 3 times daily for 90 days. Dispense:  90 tablet     Refill:  3    oxyCODONE HCl (OXY-IR) 10 MG immediate release tablet     Sig: Take 1 tablet by mouth every 6 hours as needed for Pain for up to 30 days. Intended supply: 30 days per pain management Max Daily Amount: 40 mg     Dispense:  120 tablet     Refill:  0     Reduce doses taken as pain becomes manageable    Lancets MISC     Si each by Does not apply route daily     Dispense:  100 each     Refill:  1    fluticasone-salmeterol (ADVAIR) 500-50 MCG/ACT AEPB diskus inhaler     Sig: Inhale 1 puff into the lungs in the morning and 1 puff in the evening. Dispense:  60 each     Refill:  5    dapagliflozin (FARXIGA) 10 MG tablet     Sig: Take 1 tablet by mouth every morning     Dispense:  90 tablet     Refill:  1        Return in about 6 months (around 2023), or if symptoms worsen or fail to improve, for medicare wellness . There are no Patient Instructions on file for this visit.

## 2023-05-28 ASSESSMENT — ENCOUNTER SYMPTOMS
SHORTNESS OF BREATH: 0
BLOOD IN STOOL: 0
VOMITING: 0
CONSTIPATION: 0
ABDOMINAL PAIN: 0
DIARRHEA: 0
BACK PAIN: 0
NAUSEA: 0
COUGH: 0

## 2023-05-31 DIAGNOSIS — G25.81 RLS (RESTLESS LEGS SYNDROME): ICD-10-CM

## 2023-05-31 RX ORDER — BLOOD-GLUCOSE METER
EACH MISCELLANEOUS
Qty: 1 KIT | Refills: 0 | Status: SHIPPED | OUTPATIENT
Start: 2023-05-31

## 2023-05-31 RX ORDER — LANCETS 30 GAUGE
1 EACH MISCELLANEOUS DAILY
Qty: 100 EACH | Refills: 1 | Status: CANCELLED | OUTPATIENT
Start: 2023-05-31

## 2023-05-31 RX ORDER — METFORMIN HYDROCHLORIDE 500 MG/1
TABLET, EXTENDED RELEASE ORAL
Qty: 120 TABLET | Refills: 2 | Status: SHIPPED | OUTPATIENT
Start: 2023-05-31

## 2023-05-31 RX ORDER — ROPINIROLE 1 MG/1
2 TABLET, FILM COATED ORAL NIGHTLY
Qty: 60 TABLET | Refills: 0 | Status: SHIPPED | OUTPATIENT
Start: 2023-05-31

## 2023-05-31 NOTE — TELEPHONE ENCOUNTER
Attempt #1:  Left detailed message on patient's mothers phone number stating provider note below per mothers request. Advised mother to return call to clinic to confirm message was understood and received. Clinic number given.  Arely Mckeon CMA     Last CMP on 5/22/23 at [phone]  [phone]

## 2023-07-20 DIAGNOSIS — G25.81 RLS (RESTLESS LEGS SYNDROME): ICD-10-CM

## 2023-07-20 RX ORDER — ROPINIROLE 1 MG/1
TABLET, FILM COATED ORAL
Qty: 60 TABLET | Refills: 2 | Status: SHIPPED | OUTPATIENT
Start: 2023-07-20

## 2023-07-27 ENCOUNTER — OFFICE VISIT (OUTPATIENT)
Dept: FAMILY MEDICINE CLINIC | Age: 53
End: 2023-07-27
Payer: MEDICARE

## 2023-07-27 VITALS
HEART RATE: 78 BPM | DIASTOLIC BLOOD PRESSURE: 75 MMHG | HEIGHT: 70 IN | BODY MASS INDEX: 31.15 KG/M2 | WEIGHT: 217.6 LBS | OXYGEN SATURATION: 95 % | TEMPERATURE: 97.9 F | SYSTOLIC BLOOD PRESSURE: 120 MMHG

## 2023-07-27 DIAGNOSIS — R10.31 RIGHT LOWER QUADRANT ABDOMINAL PAIN: ICD-10-CM

## 2023-07-27 DIAGNOSIS — R10.31 RIGHT LOWER QUADRANT ABDOMINAL PAIN: Primary | ICD-10-CM

## 2023-07-27 PROCEDURE — G8417 CALC BMI ABV UP PARAM F/U: HCPCS | Performed by: INTERNAL MEDICINE

## 2023-07-27 PROCEDURE — 4004F PT TOBACCO SCREEN RCVD TLK: CPT | Performed by: INTERNAL MEDICINE

## 2023-07-27 PROCEDURE — 81002 URINALYSIS NONAUTO W/O SCOPE: CPT | Performed by: INTERNAL MEDICINE

## 2023-07-27 PROCEDURE — 3017F COLORECTAL CA SCREEN DOC REV: CPT | Performed by: INTERNAL MEDICINE

## 2023-07-27 PROCEDURE — 99213 OFFICE O/P EST LOW 20 MIN: CPT | Performed by: INTERNAL MEDICINE

## 2023-07-27 PROCEDURE — G8427 DOCREV CUR MEDS BY ELIG CLIN: HCPCS | Performed by: INTERNAL MEDICINE

## 2023-07-27 RX ORDER — CIPROFLOXACIN 500 MG/1
500 TABLET, FILM COATED ORAL 2 TIMES DAILY
Qty: 20 TABLET | Refills: 0 | Status: SHIPPED | OUTPATIENT
Start: 2023-07-27 | End: 2023-08-06

## 2023-07-27 RX ORDER — DICYCLOMINE HYDROCHLORIDE 10 MG/1
10 CAPSULE ORAL 3 TIMES DAILY PRN
Qty: 30 CAPSULE | Refills: 1 | Status: SHIPPED | OUTPATIENT
Start: 2023-07-27

## 2023-07-27 NOTE — PROGRESS NOTES
SUBJECTIVE:  Mac Mitchell is a 46 y.o. male being evaluated for:    Chief Complaint   Patient presents with    Abdominal Pain     Nausea cramping       HPI   Diffuse abdominal pain and cramping  NO diarrhea or bloody stools   NO contipaition   Some nausea no vomiting  Felt hot but no fevers that he knows of   Sugars a little higher that it shoud be  He did quit [place] due   No dysuria or trouble urinating or blood in his urine    Symptoms stared a week ago and getting worse  Ignoring it   Has not treid anything    Last BM last night   Allergies   Allergen Reactions    Lyrica [Pregabalin] Other (See Comments)     \"made him black out\"    Wellbutrin [Bupropion] Other (See Comments)     Made him viloent     Current Outpatient Medications   Medication Sig Dispense Refill    dicyclomine (BENTYL) 10 MG capsule Take 1 capsule by mouth 3 times daily as needed (abd pain and bloating) 30 capsule 1    ciprofloxacin (CIPRO) 500 MG tablet Take 1 tablet by mouth 2 times daily for 10 days 20 tablet 0    rOPINIRole (REQUIP) 1 MG tablet TAKE 2 TABLETS EVERY NIGHT 60 tablet 2    metFORMIN (GLUCOPHAGE-XR) 500 MG extended release tablet TAKE 4 TABLETS BY MOUTH ONCE DAILY WITH BREAKFAST 120 tablet 2    blood glucose test strips (ASCENSIA AUTODISC VI;ONE TOUCH ULTRA TEST VI) strip Test Sugar Once Daily at Alternating Times of Day, DX: E11.9 100 each 3    Blood Glucose Monitoring Suppl (ONE TOUCH ULTRA 2) w/Device KIT Test Sugar Once Daily at Alternating Times of Day, DX: E11.9 1 kit 0    gabapentin (NEURONTIN) 800 MG tablet Take 1 tablet by mouth 3 times daily for 90 days. 90 tablet 3    Lancets MISC 1 each by Does not apply route daily 100 each 1    fluticasone-salmeterol (ADVAIR) 500-50 MCG/ACT AEPB diskus inhaler Inhale 1 puff into the lungs in the morning and 1 puff in the evening.  60 each 5    dapagliflozin (FARXIGA) 10 MG tablet Take 1 tablet by mouth every morning 90 tablet 1    hydrocortisone 2.5 % cream Apply topically 2 times

## 2023-07-28 LAB
ALBUMIN SERPL-MCNC: 4.7 G/DL (ref 3.4–5)
ALBUMIN/GLOB SERPL: 2 {RATIO} (ref 1.1–2.2)
ALP SERPL-CCNC: 86 U/L (ref 40–129)
ALT SERPL-CCNC: 17 U/L (ref 10–40)
ANION GAP SERPL CALCULATED.3IONS-SCNC: 12 MMOL/L (ref 3–16)
AST SERPL-CCNC: 16 U/L (ref 15–37)
BASOPHILS # BLD: 0.1 K/UL (ref 0–0.2)
BASOPHILS NFR BLD: 0.6 %
BILIRUB SERPL-MCNC: 0.4 MG/DL (ref 0–1)
BUN SERPL-MCNC: 10 MG/DL (ref 7–20)
CALCIUM SERPL-MCNC: 9.9 MG/DL (ref 8.3–10.6)
CHLORIDE SERPL-SCNC: 102 MMOL/L (ref 99–110)
CO2 SERPL-SCNC: 28 MMOL/L (ref 21–32)
CREAT SERPL-MCNC: 1 MG/DL (ref 0.9–1.3)
DEPRECATED RDW RBC AUTO: 13.9 % (ref 12.4–15.4)
EOSINOPHIL # BLD: 0.3 K/UL (ref 0–0.6)
EOSINOPHIL NFR BLD: 2.5 %
GFR SERPLBLD CREATININE-BSD FMLA CKD-EPI: >60 ML/MIN/{1.73_M2}
GLUCOSE SERPL-MCNC: 160 MG/DL (ref 70–99)
HCT VFR BLD AUTO: 46.9 % (ref 40.5–52.5)
HGB BLD-MCNC: 16.1 G/DL (ref 13.5–17.5)
LIPASE SERPL-CCNC: 45 U/L (ref 13–60)
LYMPHOCYTES # BLD: 3.2 K/UL (ref 1–5.1)
LYMPHOCYTES NFR BLD: 27.7 %
MCH RBC QN AUTO: 33.1 PG (ref 26–34)
MCHC RBC AUTO-ENTMCNC: 34.3 G/DL (ref 31–36)
MCV RBC AUTO: 96.4 FL (ref 80–100)
MONOCYTES # BLD: 1.2 K/UL (ref 0–1.3)
MONOCYTES NFR BLD: 10 %
NEUTROPHILS # BLD: 6.9 K/UL (ref 1.7–7.7)
NEUTROPHILS NFR BLD: 59.2 %
PLATELET # BLD AUTO: 143 K/UL (ref 135–450)
PMV BLD AUTO: 10.9 FL (ref 5–10.5)
POTASSIUM SERPL-SCNC: 4.9 MMOL/L (ref 3.5–5.1)
PROT SERPL-MCNC: 7 G/DL (ref 6.4–8.2)
RBC # BLD AUTO: 4.87 M/UL (ref 4.2–5.9)
SODIUM SERPL-SCNC: 142 MMOL/L (ref 136–145)
WBC # BLD AUTO: 11.7 K/UL (ref 4–11)

## 2023-07-30 ENCOUNTER — TELEPHONE (OUTPATIENT)
Dept: FAMILY MEDICINE CLINIC | Age: 53
End: 2023-07-30

## 2023-07-30 ASSESSMENT — ENCOUNTER SYMPTOMS
VOMITING: 0
NAUSEA: 1
DIARRHEA: 0
BLOOD IN STOOL: 0
ABDOMINAL PAIN: 1
CONSTIPATION: 0

## 2023-07-31 NOTE — TELEPHONE ENCOUNTER
Spoke with Lawernce Reasons pt seems to be doing better. Not complaining. Advised if sx's worsen to let us know.

## 2023-09-13 RX ORDER — METFORMIN HYDROCHLORIDE 500 MG/1
TABLET, EXTENDED RELEASE ORAL
Qty: 60 TABLET | Refills: 0 | Status: SHIPPED | OUTPATIENT
Start: 2023-09-13

## 2023-09-13 RX ORDER — METFORMIN HYDROCHLORIDE 500 MG/1
TABLET, EXTENDED RELEASE ORAL
Qty: 360 TABLET | Refills: 0 | Status: SHIPPED | OUTPATIENT
Start: 2023-09-13

## 2023-09-13 NOTE — TELEPHONE ENCOUNTER
Patient needs a short refill for 10 days while waiting on [facility] pharmacy to complete refill for 90 days.     Pharmacy is [facility] in [address] [place] [address]

## 2023-09-20 ENCOUNTER — TELEPHONE (OUTPATIENT)
Dept: FAMILY MEDICINE CLINIC | Age: 53
End: 2023-09-20

## 2023-09-20 RX ORDER — VARENICLINE TARTRATE 0.5 MG/1
.5-1 TABLET, FILM COATED ORAL SEE ADMIN INSTRUCTIONS
Qty: 57 TABLET | Refills: 0 | Status: SHIPPED | OUTPATIENT
Start: 2023-09-20

## 2023-09-20 NOTE — TELEPHONE ENCOUNTER
----- Message from Mahogany Jaden sent at 9/19/2023  9:53 PM EDT -----  Regarding: Stop smoking  Contact: [phone]  I'm ready to stop. Could you please send in a prescription to help. Like Chantix or patches, whichever insurance will cover. I can't take Wellbutrin!  Thank you Ken Rinne

## 2023-09-20 NOTE — TELEPHONE ENCOUNTER
Lm Dr Kamilah Gupta message for the pt instructing to call with any problems or questions.    Also sent response via my chart

## 2023-09-20 NOTE — TELEPHONE ENCOUNTER
Will start with chantix if depression or problems with it to call  Will do a month of titration up starter pack and ten to call with maintenance pack

## 2023-09-25 RX ORDER — VARENICLINE TARTRATE 25 MG
KIT ORAL
Qty: 1 EACH | Refills: 0 | OUTPATIENT
Start: 2023-09-25

## 2023-09-26 RX ORDER — VARENICLINE TARTRATE 25 MG
KIT ORAL
Qty: 1 EACH | Refills: 0 | Status: SHIPPED | OUTPATIENT
Start: 2023-09-26

## 2023-10-26 RX ORDER — ATORVASTATIN CALCIUM 40 MG/1
40 TABLET, FILM COATED ORAL DAILY
Qty: 90 TABLET | Refills: 1 | Status: SHIPPED | OUTPATIENT
Start: 2023-10-26

## 2023-11-06 DIAGNOSIS — G25.81 RLS (RESTLESS LEGS SYNDROME): ICD-10-CM

## 2023-11-06 DIAGNOSIS — F32.4 MAJOR DEPRESSIVE DISORDER WITH SINGLE EPISODE, IN PARTIAL REMISSION (HCC): ICD-10-CM

## 2023-11-06 DIAGNOSIS — M50.00 DISC DISEASE WITH MYELOPATHY, CERVICAL: ICD-10-CM

## 2023-11-06 RX ORDER — DULOXETIN HYDROCHLORIDE 60 MG/1
CAPSULE, DELAYED RELEASE ORAL
Qty: 90 CAPSULE | Refills: 10 | Status: SHIPPED | OUTPATIENT
Start: 2023-11-06

## 2023-11-06 RX ORDER — ROPINIROLE 1 MG/1
TABLET, FILM COATED ORAL
Qty: 180 TABLET | Refills: 10 | Status: SHIPPED | OUTPATIENT
Start: 2023-11-06

## 2023-11-08 RX ORDER — DAPAGLIFLOZIN 10 MG/1
10 TABLET, FILM COATED ORAL EVERY MORNING
Qty: 90 TABLET | Refills: 0 | Status: SHIPPED | OUTPATIENT
Start: 2023-11-08

## 2023-11-15 LAB
ALBUMIN SERPL-MCNC: 4.4 GM/DL (ref 3.5–5.2)
ALP BLD-CCNC: 79 U/L (ref 40–129)
ALT SERPL-CCNC: 19 U/L
ANION GAP SERPL CALCULATED.3IONS-SCNC: 10 MMOL/L (ref 7–16)
AST SERPL-CCNC: 18 U/L
BASOPHILS ABSOLUTE: 0 X10(3)/MCL (ref 0–0.1)
BASOPHILS RELATIVE PERCENT: 0.4 %
BILIRUB SERPL-MCNC: 0.4 MG/DL (ref 0.2–1.4)
BUN BLDV-MCNC: 11 MG/DL (ref 6–20)
CALCIUM SERPL-MCNC: 9.5 MG/DL (ref 8.6–10.4)
CHLORIDE BLD-SCNC: 108 MMOL/L (ref 98–107)
CO2: 26 MMOL/L (ref 22–29)
CREAT SERPL-MCNC: 0.83 MG/DL (ref 0.67–1.3)
CREATININE URINE: 88.9 MG/DL
EOSINOPHILS ABSOLUTE: 0.3 X10(3)/MCL (ref 0–0.5)
EOSINOPHILS RELATIVE PERCENT: 3.1 %
ESTIMATED AVERAGE GLUCOSE: 157 MG/DL
GFR, ESTIMATED: 105 ML/MIN/1.73 M2
GLUCOSE BLD-MCNC: 124 MG/DL (ref 74–100)
GRANULOCYTE IMMATURE ABS: 0 X10(3)/MCL (ref 0–0.1)
HBA1C MFR BLD: 7.1 % (ref 4.2–5.6)
HCT VFR BLD CALC: 52.7 % (ref 40–51)
HEMOGLOBIN: 17.4 G/DL (ref 13.7–17.5)
IMMATURE GRANULOCYTES: 0.3 %
LYMPHOCYTES ABSOLUTE: 2.9 X10(3)/MCL (ref 1.2–3.9)
LYMPHOCYTES ABSOLUTE: 32.6 %
MCH RBC QN AUTO: 31.5 PG (ref 26–34)
MCHC RBC AUTO-ENTMCNC: 33 G/DL (ref 30.7–35.5)
MCV RBC AUTO: 95.3 FL (ref 80–100)
MICROALBUMIN UR-MCNC: NORMAL MG/L
MICROALBUMIN/CREAT UR-RTO: NORMAL
MONOCYTES # BLD: 10.5 %
MONOCYTES ABSOLUTE: 0.9 X10(3)/MCL (ref 0.3–0.9)
NEUTROPHILS ABSOLUTE: 4.7 X10(3)/MCL (ref 1.6–6.1)
PDW BLD-RTO: 13.9 %
PLATELET # BLD: 153 X10(3)/MCL (ref 155–369)
PMV BLD AUTO: 12.8 FL (ref 8.8–12.5)
POTASSIUM SERPL-SCNC: 4.4 MMOL/L (ref 3.5–5)
RBC # BLD: 5.53 X10(6)/MCL (ref 4.6–6.1)
SEGMENTED NEUTROPHILS RELATIVE PERCENT: 53.1 %
SODIUM BLD-SCNC: 144 MMOL/L (ref 136–145)
TOTAL PROTEIN: 6.4 GM/DL (ref 6.4–8.3)
WBC # BLD: 8.9 X10(3)/MCL (ref 3.7–10.3)

## 2023-11-16 ENCOUNTER — OFFICE VISIT (OUTPATIENT)
Dept: FAMILY MEDICINE CLINIC | Age: 53
End: 2023-11-16

## 2023-11-16 VITALS
TEMPERATURE: 98.2 F | SYSTOLIC BLOOD PRESSURE: 116 MMHG | BODY MASS INDEX: 30.21 KG/M2 | OXYGEN SATURATION: 94 % | WEIGHT: 211 LBS | DIASTOLIC BLOOD PRESSURE: 68 MMHG | HEIGHT: 70 IN | HEART RATE: 73 BPM

## 2023-11-16 DIAGNOSIS — Z00.00 MEDICARE ANNUAL WELLNESS VISIT, SUBSEQUENT: Primary | ICD-10-CM

## 2023-11-16 DIAGNOSIS — F17.218 CIGARETTE NICOTINE DEPENDENCE WITH OTHER NICOTINE-INDUCED DISORDER: ICD-10-CM

## 2023-11-16 DIAGNOSIS — E11.9 TYPE 2 DIABETES MELLITUS WITHOUT COMPLICATION, WITH LONG-TERM CURRENT USE OF INSULIN (HCC): ICD-10-CM

## 2023-11-16 DIAGNOSIS — Z87.891 PERSONAL HISTORY OF TOBACCO USE: ICD-10-CM

## 2023-11-16 DIAGNOSIS — Z79.4 TYPE 2 DIABETES MELLITUS WITHOUT COMPLICATION, WITH LONG-TERM CURRENT USE OF INSULIN (HCC): ICD-10-CM

## 2023-11-16 DIAGNOSIS — M96.1 FAILED NECK SYNDROME: ICD-10-CM

## 2023-11-16 DIAGNOSIS — J44.9 CHRONIC OBSTRUCTIVE PULMONARY DISEASE, UNSPECIFIED COPD TYPE (HCC): ICD-10-CM

## 2023-11-16 DIAGNOSIS — N52.9 ERECTILE DYSFUNCTION, UNSPECIFIED ERECTILE DYSFUNCTION TYPE: ICD-10-CM

## 2023-11-16 DIAGNOSIS — E78.00 HYPERCHOLESTEREMIA: ICD-10-CM

## 2023-11-16 DIAGNOSIS — M77.11 RIGHT LATERAL EPICONDYLITIS: ICD-10-CM

## 2023-11-16 RX ORDER — VARENICLINE TARTRATE 1 MG/1
1 TABLET, FILM COATED ORAL 2 TIMES DAILY
Qty: 180 TABLET | Refills: 1 | Status: SHIPPED | OUTPATIENT
Start: 2023-11-16

## 2023-11-16 RX ORDER — PREDNISONE 20 MG/1
20 TABLET ORAL DAILY
Qty: 10 TABLET | Refills: 0 | Status: SHIPPED | OUTPATIENT
Start: 2023-11-16 | End: 2023-11-26

## 2023-11-16 RX ORDER — VARDENAFIL HYDROCHLORIDE 20 MG/1
20 TABLET ORAL PRN
Qty: 30 TABLET | Refills: 3 | Status: SHIPPED | OUTPATIENT
Start: 2023-11-16

## 2023-11-16 RX ORDER — METFORMIN HYDROCHLORIDE 500 MG/1
TABLET, EXTENDED RELEASE ORAL
Qty: 360 TABLET | Refills: 1 | Status: SHIPPED | OUTPATIENT
Start: 2023-11-16

## 2023-11-16 ASSESSMENT — LIFESTYLE VARIABLES
HOW MANY STANDARD DRINKS CONTAINING ALCOHOL DO YOU HAVE ON A TYPICAL DAY: 1 OR 2
HOW OFTEN DO YOU HAVE A DRINK CONTAINING ALCOHOL: 2-4 TIMES A MONTH

## 2023-11-16 ASSESSMENT — PATIENT HEALTH QUESTIONNAIRE - PHQ9
4. FEELING TIRED OR HAVING LITTLE ENERGY: 0
SUM OF ALL RESPONSES TO PHQ QUESTIONS 1-9: 2
7. TROUBLE CONCENTRATING ON THINGS, SUCH AS READING THE NEWSPAPER OR WATCHING TELEVISION: 0
SUM OF ALL RESPONSES TO PHQ QUESTIONS 1-9: 2
2. FEELING DOWN, DEPRESSED OR HOPELESS: 0
SUM OF ALL RESPONSES TO PHQ QUESTIONS 1-9: 2
8. MOVING OR SPEAKING SO SLOWLY THAT OTHER PEOPLE COULD HAVE NOTICED. OR THE OPPOSITE, BEING SO FIGETY OR RESTLESS THAT YOU HAVE BEEN MOVING AROUND A LOT MORE THAN USUAL: 0
6. FEELING BAD ABOUT YOURSELF - OR THAT YOU ARE A FAILURE OR HAVE LET YOURSELF OR YOUR FAMILY DOWN: 0
5. POOR APPETITE OR OVEREATING: 0
10. IF YOU CHECKED OFF ANY PROBLEMS, HOW DIFFICULT HAVE THESE PROBLEMS MADE IT FOR YOU TO DO YOUR WORK, TAKE CARE OF THINGS AT HOME, OR GET ALONG WITH OTHER PEOPLE: 0
3. TROUBLE FALLING OR STAYING ASLEEP: 0
SUM OF ALL RESPONSES TO PHQ QUESTIONS 1-9: 2
9. THOUGHTS THAT YOU WOULD BE BETTER OFF DEAD, OR OF HURTING YOURSELF: 0
1. LITTLE INTEREST OR PLEASURE IN DOING THINGS: 2
SUM OF ALL RESPONSES TO PHQ9 QUESTIONS 1 & 2: 2

## 2023-11-16 ASSESSMENT — ENCOUNTER SYMPTOMS
CONSTIPATION: 0
COUGH: 0
VOMITING: 0
SHORTNESS OF BREATH: 0
BLOOD IN STOOL: 0
TROUBLE SWALLOWING: 0
ABDOMINAL PAIN: 0
SINUS PRESSURE: 0
BACK PAIN: 1
NAUSEA: 0
ROS SKIN COMMENTS: NO CONCERNING SKIN LESIONS
DIARRHEA: 0
WHEEZING: 0

## 2023-11-16 ASSESSMENT — COLUMBIA-SUICIDE SEVERITY RATING SCALE - C-SSRS
3. HAVE YOU BEEN THINKING ABOUT HOW YOU MIGHT KILL YOURSELF?: NO
4. HAVE YOU HAD THESE THOUGHTS AND HAD SOME INTENTION OF ACTING ON THEM?: NO
7. DID THIS OCCUR IN THE LAST THREE MONTHS: NO
5. HAVE YOU STARTED TO WORK OUT OR WORKED OUT THE DETAILS OF HOW TO KILL YOURSELF? DO YOU INTEND TO CARRY OUT THIS PLAN?: NO

## 2023-11-16 NOTE — PATIENT INSTRUCTIONS
Recommendations:    A preventive eye exam performed by an eye specialist is recommended every 1-2 years to screen for glaucoma; cataracts, macular degeneration, and other eye disorders. A preventive dental visit is recommended every 6 months. Try to get at least 150 minutes of exercise per week or 10,000 steps per day on a pedometer . Order or download the FREE \"Exercise & Physical Activity: Your Everyday Guide\" from [employer]. Call 1-979.951.7445 or search [employer] online. You need 8026-6486 mg of calcium and 6131-2910 IU of vitamin D per day. It is possible to meet your calcium requirement with diet alone, but a vitamin D supplement is usually necessary to meet this goal.  When exposed to the sun, use a sunscreen that protects against both UVA and UVB radiation with an SPF of 30 or greater. Reapply every 2 to 3 hours or after sweating, drying off with a towel, or swimming. Always wear a seat belt when traveling in a car. Always wear a helmet when riding a bicycle or motorcycle.

## 2023-12-04 ENCOUNTER — TELEPHONE (OUTPATIENT)
Dept: ADMINISTRATIVE | Age: 53
End: 2023-12-04

## 2023-12-04 NOTE — TELEPHONE ENCOUNTER
Submitted PA for OneTouch Ultra 2 w/Device kit  Via [facility] Key: [id number]  STATUS: PENDING.    Follow up done daily; if no response in three days we will refax for status check.  If another three days goes by with no response we will call the insurance for status.

## 2023-12-04 NOTE — TELEPHONE ENCOUNTER
Submitted PA for OneTouch Ultra strips  Via [facility] Key: [id number] STATUS: PENDING. Follow up done daily; if no response in three days we will refax for status check. If another three days goes by with no response we will call the insurance for status.

## 2023-12-05 NOTE — TELEPHONE ENCOUNTER
The medication is APPROVED [facility] 12/31/2024. If this requires a response please respond to the pool ( P MHCX [address]). Thank you please advise patient.

## 2023-12-05 NOTE — TELEPHONE ENCOUNTER
The medication is APPROVED THROUGH 12/31/2024.    If this requires a response please respond to the pool ( P MHCX [facility] MEDICATION PRE-AUTH).      Thank you please advise patient.

## 2024-01-10 ENCOUNTER — TELEPHONE (OUTPATIENT)
Dept: FAMILY MEDICINE CLINIC | Age: 54
End: 2024-01-10

## 2024-01-10 DIAGNOSIS — M50.00 DISC DISEASE WITH MYELOPATHY, CERVICAL: ICD-10-CM

## 2024-01-10 DIAGNOSIS — F32.4 MAJOR DEPRESSIVE DISORDER WITH SINGLE EPISODE, IN PARTIAL REMISSION (HCC): ICD-10-CM

## 2024-01-10 DIAGNOSIS — M96.1 FAILED NECK SYNDROME: Primary | ICD-10-CM

## 2024-01-10 DIAGNOSIS — G25.81 RLS (RESTLESS LEGS SYNDROME): ICD-10-CM

## 2024-01-10 DIAGNOSIS — E11.9 TYPE 2 DIABETES MELLITUS WITHOUT COMPLICATION, WITH LONG-TERM CURRENT USE OF INSULIN (HCC): ICD-10-CM

## 2024-01-10 DIAGNOSIS — C67.9 MALIGNANT NEOPLASM OF URINARY BLADDER, UNSPECIFIED SITE (HCC): ICD-10-CM

## 2024-01-10 DIAGNOSIS — E78.2 MIXED HYPERLIPIDEMIA: ICD-10-CM

## 2024-01-10 DIAGNOSIS — Z79.4 TYPE 2 DIABETES MELLITUS WITHOUT COMPLICATION, WITH LONG-TERM CURRENT USE OF INSULIN (HCC): ICD-10-CM

## 2024-01-10 RX ORDER — ROPINIROLE 1 MG/1
TABLET, FILM COATED ORAL
Qty: 180 TABLET | Refills: 1 | Status: SHIPPED | OUTPATIENT
Start: 2024-01-10

## 2024-01-10 RX ORDER — METFORMIN HYDROCHLORIDE 500 MG/1
TABLET, EXTENDED RELEASE ORAL
Qty: 360 TABLET | Refills: 1 | Status: SHIPPED | OUTPATIENT
Start: 2024-01-10

## 2024-01-10 RX ORDER — ATORVASTATIN CALCIUM 40 MG/1
40 TABLET, FILM COATED ORAL DAILY
Qty: 90 TABLET | Refills: 1 | Status: SHIPPED | OUTPATIENT
Start: 2024-01-10

## 2024-01-10 RX ORDER — FLUTICASONE PROPIONATE AND SALMETEROL 500; 50 UG/1; UG/1
1 POWDER RESPIRATORY (INHALATION) EVERY 12 HOURS
Qty: 3 EACH | Refills: 1 | Status: SHIPPED | OUTPATIENT
Start: 2024-01-10

## 2024-01-10 RX ORDER — DULOXETIN HYDROCHLORIDE 60 MG/1
CAPSULE, DELAYED RELEASE ORAL
Qty: 90 CAPSULE | Refills: 1 | Status: SHIPPED | OUTPATIENT
Start: 2024-01-10

## 2024-01-10 NOTE — TELEPHONE ENCOUNTER
Patient was in with his wife and they now have new insurance.  And they need to have referrals done and all their medications sent. Let him know what was done please

## 2024-01-11 ENCOUNTER — TELEPHONE (OUTPATIENT)
Dept: FAMILY MEDICINE CLINIC | Age: 54
End: 2024-01-11

## 2024-01-11 DIAGNOSIS — F17.218 CIGARETTE NICOTINE DEPENDENCE WITH OTHER NICOTINE-INDUCED DISORDER: ICD-10-CM

## 2024-01-11 DIAGNOSIS — Z87.891 PERSONAL HISTORY OF TOBACCO USE: ICD-10-CM

## 2024-01-11 NOTE — TELEPHONE ENCOUNTER
----- Message from Parth Chamorro sent at 1/10/2024 10:26 PM EST -----  Regarding: Stop smoking  Contact: [phone]  Will you please send in another prescription for Chantix? Last year's insurance never covered a 2nd month. Desperately need the help to stop. Thanks Parth

## 2024-01-11 NOTE — TELEPHONE ENCOUNTER
Called [phone] (home) left message for Parth to give a call back for information on referral.

## 2024-01-15 RX ORDER — VARENICLINE TARTRATE 1 MG/1
1 TABLET, FILM COATED ORAL 2 TIMES DAILY
Qty: 180 TABLET | Refills: 1 | Status: SHIPPED | OUTPATIENT
Start: 2024-01-15

## 2024-01-15 NOTE — TELEPHONE ENCOUNTER
As long as has done okay with it will start with maintenance pack Sent to [facility]?

## 2024-01-15 NOTE — TELEPHONE ENCOUNTER
When I spoke with Sheila she said that Parth was doing really good on the chantix then ins wouldn't cover it?   Yes, send Chantix to [facility].

## 2024-02-28 ENCOUNTER — TELEPHONE (OUTPATIENT)
Dept: ADMINISTRATIVE | Age: 54
End: 2024-02-28

## 2024-02-28 NOTE — TELEPHONE ENCOUNTER
Submitted PA for Varenicline Tartrate 1MG tablets   Via [facility] Key: [id number]  STATUS: PENDING.    PA Case: [id number], Status: Approved, Coverage Starts on: 1/1/2024 12:00:00 AM, Coverage Ends on: 5/28/2024 12:00:00 AM.     Follow up done daily; if no decision with in three days we will refax.  If another three days goes by with no decision will call the insurance for status.

## 2024-05-16 ENCOUNTER — OFFICE VISIT (OUTPATIENT)
Dept: FAMILY MEDICINE CLINIC | Age: 54
End: 2024-05-16
Payer: MEDICARE

## 2024-05-16 VITALS
WEIGHT: 221 LBS | HEART RATE: 74 BPM | TEMPERATURE: 98.3 F | HEIGHT: 70 IN | RESPIRATION RATE: 16 BRPM | DIASTOLIC BLOOD PRESSURE: 72 MMHG | BODY MASS INDEX: 31.64 KG/M2 | OXYGEN SATURATION: 94 % | SYSTOLIC BLOOD PRESSURE: 106 MMHG

## 2024-05-16 DIAGNOSIS — E11.9 TYPE 2 DIABETES MELLITUS WITHOUT COMPLICATION, WITH LONG-TERM CURRENT USE OF INSULIN (HCC): Primary | ICD-10-CM

## 2024-05-16 DIAGNOSIS — R53.83 FATIGUE, UNSPECIFIED TYPE: ICD-10-CM

## 2024-05-16 DIAGNOSIS — Z79.4 TYPE 2 DIABETES MELLITUS WITHOUT COMPLICATION, WITH LONG-TERM CURRENT USE OF INSULIN (HCC): Primary | ICD-10-CM

## 2024-05-16 DIAGNOSIS — M96.1 FAILED NECK SYNDROME: ICD-10-CM

## 2024-05-16 DIAGNOSIS — E78.2 MIXED HYPERLIPIDEMIA: ICD-10-CM

## 2024-05-16 DIAGNOSIS — Z87.891 PERSONAL HISTORY OF TOBACCO USE: ICD-10-CM

## 2024-05-16 DIAGNOSIS — N52.1 ERECTILE DYSFUNCTION DUE TO DISEASES CLASSIFIED ELSEWHERE: ICD-10-CM

## 2024-05-16 LAB — HBA1C MFR BLD: 7 %

## 2024-05-16 PROCEDURE — G0296 VISIT TO DETERM LDCT ELIG: HCPCS | Performed by: INTERNAL MEDICINE

## 2024-05-16 PROCEDURE — 3051F HG A1C>EQUAL 7.0%<8.0%: CPT | Performed by: INTERNAL MEDICINE

## 2024-05-16 PROCEDURE — 83036 HEMOGLOBIN GLYCOSYLATED A1C: CPT | Performed by: INTERNAL MEDICINE

## 2024-05-16 PROCEDURE — 99214 OFFICE O/P EST MOD 30 MIN: CPT | Performed by: INTERNAL MEDICINE

## 2024-05-16 SDOH — ECONOMIC STABILITY: FOOD INSECURITY: WITHIN THE PAST 12 MONTHS, THE FOOD YOU BOUGHT JUST DIDN'T LAST AND YOU DIDN'T HAVE MONEY TO GET MORE.: NEVER TRUE

## 2024-05-16 SDOH — ECONOMIC STABILITY: FOOD INSECURITY: WITHIN THE PAST 12 MONTHS, YOU WORRIED THAT YOUR FOOD WOULD RUN OUT BEFORE YOU GOT MONEY TO BUY MORE.: NEVER TRUE

## 2024-05-16 SDOH — ECONOMIC STABILITY: HOUSING INSECURITY
IN THE LAST 12 MONTHS, WAS THERE A TIME WHEN YOU DID NOT HAVE A STEADY PLACE TO SLEEP OR SLEPT IN A SHELTER (INCLUDING NOW)?: NO

## 2024-05-16 SDOH — ECONOMIC STABILITY: INCOME INSECURITY: HOW HARD IS IT FOR YOU TO PAY FOR THE VERY BASICS LIKE FOOD, HOUSING, MEDICAL CARE, AND HEATING?: NOT VERY HARD

## 2024-05-16 ASSESSMENT — PATIENT HEALTH QUESTIONNAIRE - PHQ9
8. MOVING OR SPEAKING SO SLOWLY THAT OTHER PEOPLE COULD HAVE NOTICED. OR THE OPPOSITE, BEING SO FIGETY OR RESTLESS THAT YOU HAVE BEEN MOVING AROUND A LOT MORE THAN USUAL: NOT AT ALL
SUM OF ALL RESPONSES TO PHQ QUESTIONS 1-9: 1
4. FEELING TIRED OR HAVING LITTLE ENERGY: NOT AT ALL
SUM OF ALL RESPONSES TO PHQ9 QUESTIONS 1 & 2: 1
SUM OF ALL RESPONSES TO PHQ QUESTIONS 1-9: 1
9. THOUGHTS THAT YOU WOULD BE BETTER OFF DEAD, OR OF HURTING YOURSELF: NOT AT ALL
2. FEELING DOWN, DEPRESSED OR HOPELESS: NOT AT ALL
10. IF YOU CHECKED OFF ANY PROBLEMS, HOW DIFFICULT HAVE THESE PROBLEMS MADE IT FOR YOU TO DO YOUR WORK, TAKE CARE OF THINGS AT HOME, OR GET ALONG WITH OTHER PEOPLE: NOT DIFFICULT AT ALL
7. TROUBLE CONCENTRATING ON THINGS, SUCH AS READING THE NEWSPAPER OR WATCHING TELEVISION: NOT AT ALL
1. LITTLE INTEREST OR PLEASURE IN DOING THINGS: SEVERAL DAYS
SUM OF ALL RESPONSES TO PHQ QUESTIONS 1-9: 1
SUM OF ALL RESPONSES TO PHQ QUESTIONS 1-9: 1
3. TROUBLE FALLING OR STAYING ASLEEP: NOT AT ALL
5. POOR APPETITE OR OVEREATING: NOT AT ALL
6. FEELING BAD ABOUT YOURSELF - OR THAT YOU ARE A FAILURE OR HAVE LET YOURSELF OR YOUR FAMILY DOWN: NOT AT ALL

## 2024-05-18 ASSESSMENT — ENCOUNTER SYMPTOMS
ABDOMINAL PAIN: 0
CONSTIPATION: 0
DIARRHEA: 1
BACK PAIN: 1
SHORTNESS OF BREATH: 0
COUGH: 0
NAUSEA: 0
BLOOD IN STOOL: 0

## 2024-05-22 ENCOUNTER — TELEPHONE (OUTPATIENT)
Dept: FAMILY MEDICINE CLINIC | Age: 54
End: 2024-05-22

## 2024-05-22 RX ORDER — TERBINAFINE HYDROCHLORIDE 250 MG/1
250 TABLET ORAL DAILY
Qty: 30 TABLET | Refills: 0 | Status: SHIPPED | OUTPATIENT
Start: 2024-05-22 | End: 2024-06-21

## 2024-05-22 NOTE — TELEPHONE ENCOUNTER
[phone] (home)   5:45 PM    Left message for pt to return call  Medication has been sent to pharmacy

## 2024-05-22 NOTE — TELEPHONE ENCOUNTER
Elroy with [facility] calling pt is there and states the patient was there to  an antifungal medication for his face and it's not there.    Hazcv-[phone]

## 2024-06-01 DIAGNOSIS — M50.00 DISC DISEASE WITH MYELOPATHY, CERVICAL: ICD-10-CM

## 2024-06-01 DIAGNOSIS — Z79.4 TYPE 2 DIABETES MELLITUS WITHOUT COMPLICATION, WITH LONG-TERM CURRENT USE OF INSULIN (HCC): ICD-10-CM

## 2024-06-01 DIAGNOSIS — F32.4 MAJOR DEPRESSIVE DISORDER WITH SINGLE EPISODE, IN PARTIAL REMISSION (HCC): ICD-10-CM

## 2024-06-01 DIAGNOSIS — E11.9 TYPE 2 DIABETES MELLITUS WITHOUT COMPLICATION, WITH LONG-TERM CURRENT USE OF INSULIN (HCC): ICD-10-CM

## 2024-06-01 DIAGNOSIS — G25.81 RLS (RESTLESS LEGS SYNDROME): ICD-10-CM

## 2024-06-03 RX ORDER — DULOXETIN HYDROCHLORIDE 60 MG/1
CAPSULE, DELAYED RELEASE ORAL
Qty: 90 CAPSULE | Refills: 1 | Status: SHIPPED | OUTPATIENT
Start: 2024-06-03

## 2024-06-03 RX ORDER — ROPINIROLE 1 MG/1
TABLET, FILM COATED ORAL
Qty: 180 TABLET | Refills: 1 | Status: SHIPPED | OUTPATIENT
Start: 2024-06-03

## 2024-06-03 RX ORDER — ATORVASTATIN CALCIUM 40 MG/1
40 TABLET, FILM COATED ORAL DAILY
Qty: 90 TABLET | Refills: 1 | Status: SHIPPED | OUTPATIENT
Start: 2024-06-03

## 2024-06-03 RX ORDER — EMPAGLIFLOZIN 25 MG/1
25 TABLET, FILM COATED ORAL DAILY
Qty: 90 TABLET | Refills: 1 | Status: SHIPPED | OUTPATIENT
Start: 2024-06-03

## 2024-06-03 RX ORDER — METFORMIN HYDROCHLORIDE 500 MG/1
TABLET, EXTENDED RELEASE ORAL
Qty: 360 TABLET | Refills: 1 | Status: SHIPPED | OUTPATIENT
Start: 2024-06-03

## 2024-06-14 DIAGNOSIS — R53.83 FATIGUE, UNSPECIFIED TYPE: ICD-10-CM

## 2024-06-26 ENCOUNTER — OFFICE VISIT (OUTPATIENT)
Dept: FAMILY MEDICINE CLINIC | Age: 54
End: 2024-06-26
Payer: MEDICARE

## 2024-06-26 VITALS
TEMPERATURE: 98 F | WEIGHT: 224.2 LBS | HEART RATE: 78 BPM | SYSTOLIC BLOOD PRESSURE: 100 MMHG | OXYGEN SATURATION: 94 % | DIASTOLIC BLOOD PRESSURE: 70 MMHG | HEIGHT: 70 IN | BODY MASS INDEX: 32.1 KG/M2

## 2024-06-26 DIAGNOSIS — J44.9 CHRONIC OBSTRUCTIVE PULMONARY DISEASE, UNSPECIFIED COPD TYPE (HCC): ICD-10-CM

## 2024-06-26 DIAGNOSIS — E78.2 MIXED HYPERLIPIDEMIA: ICD-10-CM

## 2024-06-26 DIAGNOSIS — G47.429 NARCOLEPSY DUE TO UNDERLYING CONDITION WITHOUT CATAPLEXY: ICD-10-CM

## 2024-06-26 DIAGNOSIS — Z79.4 TYPE 2 DIABETES MELLITUS WITHOUT COMPLICATION, WITH LONG-TERM CURRENT USE OF INSULIN (HCC): ICD-10-CM

## 2024-06-26 DIAGNOSIS — E11.9 TYPE 2 DIABETES MELLITUS WITHOUT COMPLICATION, WITH LONG-TERM CURRENT USE OF INSULIN (HCC): ICD-10-CM

## 2024-06-26 DIAGNOSIS — L71.9 ROSACEA: ICD-10-CM

## 2024-06-26 DIAGNOSIS — R53.82 CHRONIC FATIGUE: Primary | ICD-10-CM

## 2024-06-26 DIAGNOSIS — R35.1 NOCTURIA: ICD-10-CM

## 2024-06-26 LAB
BILIRUBIN, POC: NORMAL
BLOOD URINE, POC: NORMAL
CLARITY, POC: CLEAR
COLOR, POC: NORMAL
GLUCOSE URINE, POC: 1000
KETONES, POC: NORMAL
LEUKOCYTE EST, POC: NORMAL
NITRITE, POC: NORMAL
PH, POC: 5.5
PROTEIN, POC: NORMAL
SPECIFIC GRAVITY, POC: 1.01
UROBILINOGEN, POC: 0.2

## 2024-06-26 PROCEDURE — 99214 OFFICE O/P EST MOD 30 MIN: CPT | Performed by: INTERNAL MEDICINE

## 2024-06-26 PROCEDURE — 3051F HG A1C>EQUAL 7.0%<8.0%: CPT | Performed by: INTERNAL MEDICINE

## 2024-06-26 PROCEDURE — 81002 URINALYSIS NONAUTO W/O SCOPE: CPT | Performed by: INTERNAL MEDICINE

## 2024-06-26 RX ORDER — DOXYCYCLINE HYCLATE 100 MG
100 TABLET ORAL 2 TIMES DAILY
Qty: 60 TABLET | Refills: 0 | Status: SHIPPED | OUTPATIENT
Start: 2024-06-26 | End: 2024-07-26

## 2024-06-26 RX ORDER — DOXYCYCLINE HYCLATE 100 MG
100 TABLET ORAL 2 TIMES DAILY
Qty: 20 TABLET | Refills: 0 | Status: SHIPPED | OUTPATIENT
Start: 2024-06-26 | End: 2024-06-26 | Stop reason: CLARIF

## 2024-06-26 NOTE — PROGRESS NOTES
No swelling.      Cervical back: Neck supple. Tenderness present. Muscular tenderness (Posterior tenderness) present.   Lymphadenopathy:      Cervical: No cervical adenopathy.   Skin:     General: Skin is warm and dry.      Findings: Rash (pustles on nose rash on cheeks and forehead) present.   Neurological:      General: No focal deficit present.      Mental Status: He is alert.      Gait: Gait normal.   Psychiatric:         Mood and Affect: Mood normal.         Behavior: Behavior normal.         Thought Content: Thought content normal.         ASSESSMENT/PLAN:    Parth was seen today for fatigue.    Diagnoses and all orders for this visit:    Chronic fatigue ? Narcolepsy and treatment?   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 cataplexy  -     Red Gardner MD, Sleep Medicine, [facility]    Type 2 diabetes mellitus without complication, with long-term current use of insulin ([facility]) recent HGA1c 7  -     Comprehensive Metabolic Panel; Future    Mixed hyperlipidemia  -     Comprehensive Metabolic Panel; Future  -     Lipid Panel; Future    Chronic obstructive pulmonary disease, unspecified COPD type (HCC) on inhalers and okay     Rosacea  -     doxycycline hyclate (VIBRA-TABS) 100 MG tablet; Take 1 tablet by mouth 2 times daily    Nocturia  -     POCT Urinalysis no Micro        Orders Placed This Encounter   Medications    DISCONTD: doxycycline hyclate (VIBRA-TABS) 100 MG tablet     Sig: Take 1 tablet by mouth 2 times daily for 10 days     Dispense:  20 tablet     Refill:  0    doxycycline hyclate (VIBRA-TABS) 100 MG tablet     Sig: Take 1 tablet by mouth 2 times daily     Dispense:  60 tablet     Refill:  0        Return if symptoms worsen or fail to improve, for keep medicare wellness in November .     There are no Patient Instructions on file

## 2024-07-01 DIAGNOSIS — R53.82 CHRONIC FATIGUE: ICD-10-CM

## 2024-07-01 DIAGNOSIS — E78.2 MIXED HYPERLIPIDEMIA: ICD-10-CM

## 2024-07-01 DIAGNOSIS — Z79.4 TYPE 2 DIABETES MELLITUS WITHOUT COMPLICATION, WITH LONG-TERM CURRENT USE OF INSULIN (HCC): ICD-10-CM

## 2024-07-01 DIAGNOSIS — E11.9 TYPE 2 DIABETES MELLITUS WITHOUT COMPLICATION, WITH LONG-TERM CURRENT USE OF INSULIN (HCC): ICD-10-CM

## 2024-07-25 ENCOUNTER — TELEPHONE (OUTPATIENT)
Dept: FAMILY MEDICINE CLINIC | Age: 54
End: 2024-07-25

## 2024-07-25 NOTE — TELEPHONE ENCOUNTER
4:54 PM    Advised pt he needs to follow up with pain management for neck pain    Pt scheduled for OV 8-13-24 to discuss memory loss and anxiety

## 2024-07-25 NOTE — TELEPHONE ENCOUNTER
Geraldine (wife) calling to see if the pt can be seen soon for increased anxiety and pain? Pl advise.

## 2024-07-25 NOTE — TELEPHONE ENCOUNTER
3:54 PM  [phone] Geraldine pt wife     Calling to get more information regarding what type of pain is he experiencing  And for how long

## 2024-08-13 ENCOUNTER — OFFICE VISIT (OUTPATIENT)
Dept: FAMILY MEDICINE CLINIC | Age: 54
End: 2024-08-13

## 2024-08-13 VITALS
DIASTOLIC BLOOD PRESSURE: 62 MMHG | BODY MASS INDEX: 31.64 KG/M2 | HEART RATE: 88 BPM | WEIGHT: 221 LBS | RESPIRATION RATE: 16 BRPM | OXYGEN SATURATION: 94 % | SYSTOLIC BLOOD PRESSURE: 108 MMHG | HEIGHT: 70 IN | TEMPERATURE: 98.6 F

## 2024-08-13 DIAGNOSIS — L71.9 ROSACEA: ICD-10-CM

## 2024-08-13 DIAGNOSIS — R45.1 AGITATION: ICD-10-CM

## 2024-08-13 DIAGNOSIS — M50.00 DISC DISEASE WITH MYELOPATHY, CERVICAL: ICD-10-CM

## 2024-08-13 DIAGNOSIS — G47.429 NARCOLEPSY DUE TO UNDERLYING CONDITION WITHOUT CATAPLEXY: Primary | ICD-10-CM

## 2024-08-13 DIAGNOSIS — C67.1 MALIGNANT NEOPLASM OF DOME OF URINARY BLADDER (HCC): ICD-10-CM

## 2024-08-13 DIAGNOSIS — E11.9 TYPE 2 DIABETES MELLITUS WITHOUT COMPLICATION, WITHOUT LONG-TERM CURRENT USE OF INSULIN (HCC): ICD-10-CM

## 2024-08-13 RX ORDER — DIVALPROEX SODIUM 250 MG/1
250 TABLET, DELAYED RELEASE ORAL NIGHTLY
Qty: 90 TABLET | Refills: 3 | Status: SHIPPED | OUTPATIENT
Start: 2024-08-13

## 2024-08-13 RX ORDER — DOXYCYCLINE HYCLATE 100 MG
100 TABLET ORAL 2 TIMES DAILY
Qty: 60 TABLET | Refills: 0 | Status: SHIPPED | OUTPATIENT
Start: 2024-08-13 | End: 2024-09-12

## 2024-08-13 RX ORDER — FLUOXETINE HYDROCHLORIDE 20 MG/1
20 CAPSULE ORAL DAILY
Qty: 30 CAPSULE | Refills: 3 | Status: SHIPPED | OUTPATIENT
Start: 2024-08-13

## 2024-08-13 RX ORDER — LANOLIN ALCOHOL/MO/W.PET/CERES
1000 CREAM (GRAM) TOPICAL DAILY
COMMUNITY

## 2024-08-13 RX ORDER — LANCETS 30 GAUGE
1 EACH MISCELLANEOUS DAILY
Qty: 100 EACH | Refills: 1 | Status: SHIPPED | OUTPATIENT
Start: 2024-08-13

## 2024-08-13 RX ORDER — MODAFINIL 200 MG/1
200 TABLET ORAL DAILY
Qty: 30 TABLET | Refills: 2 | Status: SHIPPED | OUTPATIENT
Start: 2024-08-13 | End: 2024-11-11

## 2024-08-13 RX ORDER — CALCIUM CARBONATE/VITAMIN D3 600 MG-10
1 TABLET ORAL DAILY
COMMUNITY

## 2024-08-13 RX ORDER — METFORMIN HYDROCHLORIDE 500 MG/1
TABLET, EXTENDED RELEASE ORAL
Qty: 360 TABLET | Refills: 1 | Status: SHIPPED | OUTPATIENT
Start: 2024-08-13

## 2024-08-13 RX ORDER — GABAPENTIN 600 MG/1
600 TABLET ORAL 4 TIMES DAILY
COMMUNITY
Start: 2024-06-25

## 2024-08-13 RX ORDER — CHLORAL HYDRATE 500 MG
1000 CAPSULE ORAL 2 TIMES DAILY
Qty: 60 CAPSULE | Refills: 5
Start: 2024-08-13

## 2024-08-13 ASSESSMENT — PATIENT HEALTH QUESTIONNAIRE - PHQ9
SUM OF ALL RESPONSES TO PHQ QUESTIONS 1-9: 12
10. IF YOU CHECKED OFF ANY PROBLEMS, HOW DIFFICULT HAVE THESE PROBLEMS MADE IT FOR YOU TO DO YOUR WORK, TAKE CARE OF THINGS AT HOME, OR GET ALONG WITH OTHER PEOPLE: EXTREMELY DIFFICULT
1. LITTLE INTEREST OR PLEASURE IN DOING THINGS: SEVERAL DAYS
6. FEELING BAD ABOUT YOURSELF - OR THAT YOU ARE A FAILURE OR HAVE LET YOURSELF OR YOUR FAMILY DOWN: NEARLY EVERY DAY
3. TROUBLE FALLING OR STAYING ASLEEP: SEVERAL DAYS
SUM OF ALL RESPONSES TO PHQ QUESTIONS 1-9: 12
SUM OF ALL RESPONSES TO PHQ9 QUESTIONS 1 & 2: 2
4. FEELING TIRED OR HAVING LITTLE ENERGY: NEARLY EVERY DAY
9. THOUGHTS THAT YOU WOULD BE BETTER OFF DEAD, OR OF HURTING YOURSELF: NOT AT ALL
7. TROUBLE CONCENTRATING ON THINGS, SUCH AS READING THE NEWSPAPER OR WATCHING TELEVISION: SEVERAL DAYS
2. FEELING DOWN, DEPRESSED OR HOPELESS: SEVERAL DAYS
SUM OF ALL RESPONSES TO PHQ QUESTIONS 1-9: 12
8. MOVING OR SPEAKING SO SLOWLY THAT OTHER PEOPLE COULD HAVE NOTICED. OR THE OPPOSITE, BEING SO FIGETY OR RESTLESS THAT YOU HAVE BEEN MOVING AROUND A LOT MORE THAN USUAL: SEVERAL DAYS
5. POOR APPETITE OR OVEREATING: SEVERAL DAYS
SUM OF ALL RESPONSES TO PHQ QUESTIONS 1-9: 12

## 2024-08-13 NOTE — PROGRESS NOTES
SUBJECTIVE:  Parth Chamorro is a 53 y.o. male being evaluated for:    Chief Complaint   Patient presents with    Anxiety     Duloxetine not working well, gets agitated easily.         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      Cholecalciferol (VITAMIN D-3 PO) Take 1 tablet by mouth daily      vitamin B-12 (CYANOCOBALAMIN) 1000 MCG tablet Take 1 tablet by mouth daily      Omega 3 1200 MG CAPS Take 1 tablet by mouth daily      Potassium 99 MG TABS Take 1 tablet by mouth daily      MAGNESIUM PO Take 1 tablet by mouth daily      NONFORMULARY Take 1 tablet by mouth 2 times daily Indications: MEMORY, FOCUS, CLARITY ASHWAGANDA      modafinil (PROVIGIL) 200 MG tablet Take 1 tablet by mouth daily for 90 days. Max Daily Amount: 200 mg 30 tablet 2    divalproex (DEPAKOTE) 250 MG DR tablet Take 1 tablet by mouth at bedtime 90 tablet 3    FLUoxetine (PROZAC) 20 MG capsule Take 1 capsule by mouth daily 30 capsule 3    doxycycline hyclate (VIBRA-TABS) 100 MG tablet Take 1 tablet by mouth 2 times daily 60 tablet 0    Omega-3 Fatty Acids (FISH OIL) 1000 MG capsule Take 1 capsule by mouth 2 times daily 60 capsule 5

## 2024-08-14 ENCOUNTER — TELEPHONE (OUTPATIENT)
Dept: ADMINISTRATIVE | Age: 54
End: 2024-08-14

## 2024-08-14 ENCOUNTER — TELEPHONE (OUTPATIENT)
Dept: FAMILY MEDICINE CLINIC | Age: 54
End: 2024-08-14

## 2024-08-14 ASSESSMENT — ENCOUNTER SYMPTOMS
BLOOD IN STOOL: 0
VOMITING: 0
ABDOMINAL PAIN: 0
BACK PAIN: 1
DIARRHEA: 0
NAUSEA: 0
COUGH: 0
SHORTNESS OF BREATH: 0
CONSTIPATION: 0
APNEA: 0

## 2024-08-14 NOTE — TELEPHONE ENCOUNTER
[facility] pharmacy calling to find out if pt should be taking modafinil and oxycodone together.    Gxqtape-[phone]

## 2024-08-14 NOTE — TELEPHONE ENCOUNTER
Started PA for Modafinil 200MG tablets  Via [facility] Key: [id number]  STATUS: NOT SENT    Please send a response to the PA pool when Progress Note dated 8/13/24 has been signed.  This note needs to be submitted with the PA.    Please respond to the pool ( P MHCX PSC MEDICATION PRE-AUTH).      Thank you!

## 2024-08-15 NOTE — TELEPHONE ENCOUNTER
[facility] has reversed and archived the PA request for Modafinil 200MG tablets.  I called the pharmacy and spoke to Shaylee.  Shaylee said that they got a paid claim for the medication and the cost will be $26.44.    Please advise the patient.  Thank you!

## 2024-11-07 ENCOUNTER — TELEPHONE (OUTPATIENT)
Dept: FAMILY MEDICINE CLINIC | Age: 54
End: 2024-11-07

## 2024-11-07 DIAGNOSIS — E78.2 MIXED HYPERLIPIDEMIA: ICD-10-CM

## 2024-11-07 DIAGNOSIS — E11.9 TYPE 2 DIABETES MELLITUS WITHOUT COMPLICATION, WITHOUT LONG-TERM CURRENT USE OF INSULIN (HCC): Primary | ICD-10-CM

## 2024-11-07 NOTE — TELEPHONE ENCOUNTER
Pt has an appt on 11/19 and is asking if he needs labs done prior to the appt. Please put in orders if needed.

## 2024-11-18 DIAGNOSIS — E11.9 TYPE 2 DIABETES MELLITUS WITHOUT COMPLICATION, WITH LONG-TERM CURRENT USE OF INSULIN (HCC): ICD-10-CM

## 2024-11-18 DIAGNOSIS — Z79.4 TYPE 2 DIABETES MELLITUS WITHOUT COMPLICATION, WITH LONG-TERM CURRENT USE OF INSULIN (HCC): ICD-10-CM

## 2024-11-18 LAB — HBA1C MFR BLD HPLC: 7.3 %

## 2024-11-18 RX ORDER — ATORVASTATIN CALCIUM 40 MG/1
40 TABLET, FILM COATED ORAL DAILY
Qty: 90 TABLET | Refills: 0 | Status: SHIPPED | OUTPATIENT
Start: 2024-11-18

## 2024-11-18 RX ORDER — EMPAGLIFLOZIN 25 MG/1
25 TABLET, FILM COATED ORAL DAILY
Qty: 90 TABLET | Refills: 1 | Status: SHIPPED | OUTPATIENT
Start: 2024-11-18

## 2024-11-19 ENCOUNTER — OFFICE VISIT (OUTPATIENT)
Dept: FAMILY MEDICINE CLINIC | Age: 54
End: 2024-11-19

## 2024-11-19 VITALS
HEIGHT: 70 IN | BODY MASS INDEX: 30.01 KG/M2 | HEART RATE: 90 BPM | OXYGEN SATURATION: 95 % | RESPIRATION RATE: 18 BRPM | WEIGHT: 209.6 LBS | DIASTOLIC BLOOD PRESSURE: 80 MMHG | SYSTOLIC BLOOD PRESSURE: 114 MMHG | TEMPERATURE: 98.1 F

## 2024-11-19 DIAGNOSIS — C67.1 MALIGNANT NEOPLASM OF DOME OF URINARY BLADDER (HCC): ICD-10-CM

## 2024-11-19 DIAGNOSIS — G89.4 CHRONIC PAIN SYNDROME: ICD-10-CM

## 2024-11-19 DIAGNOSIS — E11.9 TYPE 2 DIABETES MELLITUS WITHOUT COMPLICATION, WITH LONG-TERM CURRENT USE OF INSULIN (HCC): ICD-10-CM

## 2024-11-19 DIAGNOSIS — Z00.00 MEDICARE ANNUAL WELLNESS VISIT, SUBSEQUENT: Primary | ICD-10-CM

## 2024-11-19 DIAGNOSIS — Z87.891 PERSONAL HISTORY OF TOBACCO USE: ICD-10-CM

## 2024-11-19 DIAGNOSIS — R21 FACIAL RASH: ICD-10-CM

## 2024-11-19 DIAGNOSIS — L98.9 CHANGING SKIN LESION: ICD-10-CM

## 2024-11-19 DIAGNOSIS — Z79.4 TYPE 2 DIABETES MELLITUS WITHOUT COMPLICATION, WITH LONG-TERM CURRENT USE OF INSULIN (HCC): ICD-10-CM

## 2024-11-19 DIAGNOSIS — F32.4 MAJOR DEPRESSIVE DISORDER WITH SINGLE EPISODE, IN PARTIAL REMISSION (HCC): ICD-10-CM

## 2024-11-19 DIAGNOSIS — F45.8 TEETH GRINDING: ICD-10-CM

## 2024-11-19 DIAGNOSIS — J44.9 CHRONIC OBSTRUCTIVE PULMONARY DISEASE, UNSPECIFIED COPD TYPE (HCC): ICD-10-CM

## 2024-11-19 DIAGNOSIS — G47.419 PRIMARY NARCOLEPSY WITHOUT CATAPLEXY: ICD-10-CM

## 2024-11-19 RX ORDER — ALBUTEROL SULFATE 90 UG/1
2 INHALANT RESPIRATORY (INHALATION) 4 TIMES DAILY PRN
Qty: 18 G | Refills: 5 | Status: SHIPPED | OUTPATIENT
Start: 2024-11-19

## 2024-11-19 RX ORDER — DEXTROAMPHETAMINE SACCHARATE, AMPHETAMINE ASPARTATE MONOHYDRATE, DEXTROAMPHETAMINE SULFATE AND AMPHETAMINE SULFATE 5; 5; 5; 5 MG/1; MG/1; MG/1; MG/1
20 CAPSULE, EXTENDED RELEASE ORAL DAILY
Qty: 30 CAPSULE | Refills: 0 | Status: SHIPPED | OUTPATIENT
Start: 2024-11-19 | End: 2024-12-19

## 2024-11-19 RX ORDER — ROPINIROLE 2 MG/1
2 TABLET, FILM COATED ORAL NIGHTLY
Qty: 90 TABLET | Refills: 1 | Status: SHIPPED | OUTPATIENT
Start: 2024-11-19

## 2024-11-19 RX ORDER — METRONIDAZOLE 10 MG/G
GEL TOPICAL
Qty: 60 G | Refills: 0 | Status: SHIPPED | OUTPATIENT
Start: 2024-11-19

## 2024-11-19 RX ORDER — DULOXETIN HYDROCHLORIDE 60 MG/1
60 CAPSULE, DELAYED RELEASE ORAL 2 TIMES DAILY
Qty: 180 CAPSULE | Refills: 1 | Status: SHIPPED | OUTPATIENT
Start: 2024-11-19

## 2024-11-19 RX ORDER — METFORMIN HYDROCHLORIDE 500 MG/1
TABLET, EXTENDED RELEASE ORAL
Qty: 360 TABLET | Refills: 1 | Status: SHIPPED | OUTPATIENT
Start: 2024-11-19

## 2024-11-19 RX ORDER — MODAFINIL 200 MG/1
200 TABLET ORAL DAILY
Qty: 30 TABLET | Refills: 2 | Status: CANCELLED | OUTPATIENT
Start: 2024-11-19 | End: 2025-02-17

## 2024-11-19 SDOH — HEALTH STABILITY: PHYSICAL HEALTH: ON AVERAGE, HOW MANY MINUTES DO YOU ENGAGE IN EXERCISE AT THIS LEVEL?: 0 MIN

## 2024-11-19 SDOH — HEALTH STABILITY: PHYSICAL HEALTH: ON AVERAGE, HOW MANY DAYS PER WEEK DO YOU ENGAGE IN MODERATE TO STRENUOUS EXERCISE (LIKE A BRISK WALK)?: 0 DAYS

## 2024-11-19 ASSESSMENT — ENCOUNTER SYMPTOMS
ABDOMINAL PAIN: 0
CONSTIPATION: 0
SINUS PRESSURE: 0
SHORTNESS OF BREATH: 0
VOMITING: 0
DIARRHEA: 0
COUGH: 1
BACK PAIN: 1
BLOOD IN STOOL: 0
TROUBLE SWALLOWING: 1
WHEEZING: 0
NAUSEA: 0

## 2024-11-19 ASSESSMENT — PATIENT HEALTH QUESTIONNAIRE - PHQ9
3. TROUBLE FALLING OR STAYING ASLEEP: MORE THAN HALF THE DAYS
SUM OF ALL RESPONSES TO PHQ QUESTIONS 1-9: 16
2. FEELING DOWN, DEPRESSED OR HOPELESS: SEVERAL DAYS
4. FEELING TIRED OR HAVING LITTLE ENERGY: NEARLY EVERY DAY
6. FEELING BAD ABOUT YOURSELF - OR THAT YOU ARE A FAILURE OR HAVE LET YOURSELF OR YOUR FAMILY DOWN: SEVERAL DAYS
10. IF YOU CHECKED OFF ANY PROBLEMS, HOW DIFFICULT HAVE THESE PROBLEMS MADE IT FOR YOU TO DO YOUR WORK, TAKE CARE OF THINGS AT HOME, OR GET ALONG WITH OTHER PEOPLE: VERY DIFFICULT
SUM OF ALL RESPONSES TO PHQ9 QUESTIONS 1 & 2: 4
7. TROUBLE CONCENTRATING ON THINGS, SUCH AS READING THE NEWSPAPER OR WATCHING TELEVISION: NEARLY EVERY DAY
1. LITTLE INTEREST OR PLEASURE IN DOING THINGS: NEARLY EVERY DAY
SUM OF ALL RESPONSES TO PHQ QUESTIONS 1-9: 16
SUM OF ALL RESPONSES TO PHQ QUESTIONS 1-9: 16
5. POOR APPETITE OR OVEREATING: NEARLY EVERY DAY
9. THOUGHTS THAT YOU WOULD BE BETTER OFF DEAD, OR OF HURTING YOURSELF: NOT AT ALL
8. MOVING OR SPEAKING SO SLOWLY THAT OTHER PEOPLE COULD HAVE NOTICED. OR THE OPPOSITE, BEING SO FIGETY OR RESTLESS THAT YOU HAVE BEEN MOVING AROUND A LOT MORE THAN USUAL: NOT AT ALL
SUM OF ALL RESPONSES TO PHQ QUESTIONS 1-9: 16

## 2024-11-19 ASSESSMENT — LIFESTYLE VARIABLES
HOW MANY STANDARD DRINKS CONTAINING ALCOHOL DO YOU HAVE ON A TYPICAL DAY: 1 OR 2
HOW OFTEN DO YOU HAVE A DRINK CONTAINING ALCOHOL: 3
HOW OFTEN DO YOU HAVE A DRINK CONTAINING ALCOHOL: 2-4 TIMES A MONTH
HOW MANY STANDARD DRINKS CONTAINING ALCOHOL DO YOU HAVE ON A TYPICAL DAY: 1
HOW OFTEN DO YOU HAVE SIX OR MORE DRINKS ON ONE OCCASION: 1

## 2024-11-19 NOTE — PROGRESS NOTES
depression, anxiety, or family problems that can make it hard to focus on weight loss.  Consider joining a support group for people who are trying to lose weight. Your doctor can suggest groups in your area.  Where can you learn more?  Go to https://www.[employer].net/patientEd and enter U357 to learn more about \"Starting a Weight Loss Plan: Care Instructions.\"  Current as of: September 20, 2023  Content Version: 14.2  © 2024 [employer].   Care instructions adapted under license by [employer]. If you have questions about a medical condition or this instruction, always ask your healthcare professional. [employer], [employer] disclaims any warranty or liability for your use of this information.           Advance Directives: Care Instructions  Overview  An advance directive is a legal way to state your wishes at the end of your life. It tells your family and your doctor what to do if you can't say what you want.  There are two main types of advance directives. You can change them any time your wishes change.  Living will.  This form tells your family and your doctor your wishes about life support and other treatment. The form is also called a declaration.  Medical power of .  This form lets you name a person to make treatment decisions for you when you can't speak for yourself. This person is called a health care agent (health care proxy, health care surrogate). The form is also called a durable power of  for health care.  If you do not have an advance directive, decisions about your medical care may be made by a family member, or by a doctor or a  who doesn't know you.  It may help to think of an advance directive as a gift to the people who care for you. If you have one, they won't have to make tough decisions by themselves.  For more information, including forms for your state, see the [employer] website (www.caringinfo.org/planning/advance-directives/).  Follow-up care is a key

## 2024-11-20 LAB
CREAT UR-MCNC: 108 MG/DL (ref 39–259)
MICROALBUMIN UR DL<=1MG/L-MCNC: <1.2 MG/DL
MICROALBUMIN/CREAT UR: NORMAL MG/G (ref 0–30)

## 2024-11-21 ENCOUNTER — TELEPHONE (OUTPATIENT)
Dept: FAMILY MEDICINE CLINIC | Age: 54
End: 2024-11-21

## 2024-11-21 NOTE — TELEPHONE ENCOUNTER
As I cannot get med covered would suggest he see a sleep doctor and see if we can get some of the newer ones allowed ? Who he had seen before

## 2024-11-21 NOTE — TELEPHONE ENCOUNTER
Pt advised and verbalized understanding.    Pt has referral to Dr. Joseph still open in his chart.  Number given to pt to have him call and schedule an appt with Dr. Joseph.    Pt gave verbal understanding.

## 2024-11-21 NOTE — TELEPHONE ENCOUNTER
PA for Adderall XR 20 mg was Denied by Ins. Dx of [place] without Cataplexy did not meet requirement(s) of a medially accepted indication.     Please advise

## 2024-12-06 ENCOUNTER — TELEPHONE (OUTPATIENT)
Dept: FAMILY MEDICINE CLINIC | Age: 54
End: 2024-12-06

## 2024-12-06 RX ORDER — BLOOD-GLUCOSE METER
1 KIT MISCELLANEOUS DAILY
Qty: 1 KIT | Refills: 0 | Status: SHIPPED | OUTPATIENT
Start: 2024-12-06

## 2024-12-06 NOTE — TELEPHONE ENCOUNTER
My monitor stopped working so haven't been able to check sugar. How can I get a new one? Thanks Parth

## 2024-12-10 ENCOUNTER — TELEPHONE (OUTPATIENT)
Dept: FAMILY MEDICINE CLINIC | Age: 54
End: 2024-12-10

## 2024-12-10 DIAGNOSIS — E11.9 TYPE 2 DIABETES MELLITUS WITHOUT COMPLICATION, WITH LONG-TERM CURRENT USE OF INSULIN (HCC): Primary | ICD-10-CM

## 2024-12-10 DIAGNOSIS — Z79.4 TYPE 2 DIABETES MELLITUS WITHOUT COMPLICATION, WITH LONG-TERM CURRENT USE OF INSULIN (HCC): Primary | ICD-10-CM

## 2024-12-10 RX ORDER — LANCETS 28 GAUGE
EACH MISCELLANEOUS
Qty: 100 EACH | Refills: 0 | Status: SHIPPED | OUTPATIENT
Start: 2024-12-10

## 2024-12-10 RX ORDER — GLUCOSAMINE HCL/CHONDROITIN SU 500-400 MG
CAPSULE ORAL
Qty: 100 STRIP | Refills: 0 | Status: SHIPPED | OUTPATIENT
Start: 2024-12-10

## 2024-12-10 NOTE — TELEPHONE ENCOUNTER
Patient also needs Lancets and test strips sent to [facility]. Free [employer] [place] .

## 2024-12-23 ENCOUNTER — OFFICE VISIT (OUTPATIENT)
Dept: FAMILY MEDICINE CLINIC | Age: 54
End: 2024-12-23

## 2024-12-23 VITALS
OXYGEN SATURATION: 93 % | BODY MASS INDEX: 31.25 KG/M2 | HEIGHT: 69 IN | TEMPERATURE: 98.1 F | SYSTOLIC BLOOD PRESSURE: 106 MMHG | HEART RATE: 91 BPM | DIASTOLIC BLOOD PRESSURE: 70 MMHG | RESPIRATION RATE: 18 BRPM | WEIGHT: 211 LBS

## 2024-12-23 DIAGNOSIS — E11.9 TYPE 2 DIABETES MELLITUS WITHOUT COMPLICATION, WITHOUT LONG-TERM CURRENT USE OF INSULIN (HCC): ICD-10-CM

## 2024-12-23 DIAGNOSIS — R25.1 LIP TREMOR: ICD-10-CM

## 2024-12-23 DIAGNOSIS — G25.81 RLS (RESTLESS LEGS SYNDROME): ICD-10-CM

## 2024-12-23 DIAGNOSIS — G47.419 PRIMARY NARCOLEPSY WITHOUT CATAPLEXY: Primary | ICD-10-CM

## 2024-12-23 RX ORDER — DEXTROAMPHETAMINE SACCHARATE, AMPHETAMINE ASPARTATE MONOHYDRATE, DEXTROAMPHETAMINE SULFATE AND AMPHETAMINE SULFATE 5; 5; 5; 5 MG/1; MG/1; MG/1; MG/1
20 CAPSULE, EXTENDED RELEASE ORAL DAILY
Qty: 30 CAPSULE | Refills: 0 | Status: SHIPPED | OUTPATIENT
Start: 2024-12-27 | End: 2025-01-26

## 2024-12-23 RX ORDER — PRIMIDONE 50 MG/1
50 TABLET ORAL NIGHTLY
Qty: 30 TABLET | Refills: 1 | Status: SHIPPED | OUTPATIENT
Start: 2024-12-23

## 2024-12-23 NOTE — PROGRESS NOTES
SUBJECTIVE:  Parth Chamorro is a 54 y.o. male being evaluated for:    Chief Complaint   Patient presents with    Diabetes     Pt is here for follow-up and medication refills. Pt states that the Adderall is working very well.       HPI   Sugars has not been checking it   140 this am  had ablation done this am  Seems a lot better afterwards  Able to bend   Increased dose of metformin for HGA1c 7.3 and is doing well with metformin  NO gi issues     Staying awake and it is amazing  Staying awake even if they wake him up early Neg eval for sleep apnea  T    Restless leg on requip   has not helped the tremors in his mouth    Foot locks at time    Sleeping better wife has not noticed anything   Allergies   Allergen Reactions    Lyrica [Pregabalin] Other (See Comments)     \"made him black out\"    Wellbutrin [Bupropion] Other (See Comments)     Made him viloent     Current Outpatient Medications   Medication Sig Dispense Refill    amphetamine-dextroamphetamine (ADDERALL XR) 20 MG extended release capsule Take 1 capsule by mouth daily for 30 days. Max Daily Amount: 20 mg 30 capsule 0    primidone (MYSOLINE) 50 MG tablet Take 1 tablet by mouth nightly 30 tablet 1    FreeStyle Lancets MISC Use 1 lancet daily at alternating times of day as instructed E11.9 100 each 0    blood glucose monitor strips Use 1 strip daily at alternating times of day as instructed E11.9 100 strip 0    glucose monitoring kit 1 kit by Does not apply route daily 1 kit 0    DULoxetine (CYMBALTA) 60 MG extended release capsule Take 1 capsule by mouth 2 times daily 180 capsule 1    rOPINIRole (REQUIP) 2 MG tablet Take 1 tablet by mouth nightly 90 tablet 1    metFORMIN (GLUCOPHAGE-XR) 500 MG extended release tablet TAKE 4 TABLETS BY MOUTH ONCE DAILY WITH BREAKFAST 360 tablet 1    metroNIDAZOLE (METROGEL) 1 % gel Apply topically to face twice daily after washing  daily. 60 g 0    Budeson-Glycopyrrol-Formoterol 160-9-4.8 MCG/ACT AERO Inhale 2 puffs into the lungs

## 2024-12-27 ASSESSMENT — ENCOUNTER SYMPTOMS
CONSTIPATION: 0
BACK PAIN: 1
DIARRHEA: 0
SHORTNESS OF BREATH: 0
NAUSEA: 0
ABDOMINAL PAIN: 0
VOMITING: 0
COUGH: 1

## 2025-01-03 RX ORDER — BLOOD-GLUCOSE METER
1 KIT MISCELLANEOUS DAILY PRN
Qty: 1 KIT | Refills: 0 | Status: SHIPPED | OUTPATIENT
Start: 2025-01-03

## 2025-01-20 RX ORDER — PRIMIDONE 50 MG/1
50 TABLET ORAL NIGHTLY
Qty: 90 TABLET | Refills: 1 | Status: SHIPPED | OUTPATIENT
Start: 2025-01-20

## 2025-01-27 SDOH — ECONOMIC STABILITY: INCOME INSECURITY: IN THE LAST 12 MONTHS, WAS THERE A TIME WHEN YOU WERE NOT ABLE TO PAY THE MORTGAGE OR RENT ON TIME?: PATIENT DECLINED

## 2025-01-27 SDOH — ECONOMIC STABILITY: FOOD INSECURITY: WITHIN THE PAST 12 MONTHS, YOU WORRIED THAT YOUR FOOD WOULD RUN OUT BEFORE YOU GOT MONEY TO BUY MORE.: NEVER TRUE

## 2025-01-27 SDOH — ECONOMIC STABILITY: FOOD INSECURITY: WITHIN THE PAST 12 MONTHS, THE FOOD YOU BOUGHT JUST DIDN'T LAST AND YOU DIDN'T HAVE MONEY TO GET MORE.: NEVER TRUE

## 2025-01-27 ASSESSMENT — PATIENT HEALTH QUESTIONNAIRE - PHQ9
3. TROUBLE FALLING OR STAYING ASLEEP: NOT AT ALL
SUM OF ALL RESPONSES TO PHQ9 QUESTIONS 1 & 2: 3
2. FEELING DOWN, DEPRESSED OR HOPELESS: SEVERAL DAYS
6. FEELING BAD ABOUT YOURSELF - OR THAT YOU ARE A FAILURE OR HAVE LET YOURSELF OR YOUR FAMILY DOWN: SEVERAL DAYS
2. FEELING DOWN, DEPRESSED OR HOPELESS: SEVERAL DAYS
SUM OF ALL RESPONSES TO PHQ QUESTIONS 1-9: 14
SUM OF ALL RESPONSES TO PHQ QUESTIONS 1-9: 14
8. MOVING OR SPEAKING SO SLOWLY THAT OTHER PEOPLE COULD HAVE NOTICED. OR THE OPPOSITE - BEING SO FIDGETY OR RESTLESS THAT YOU HAVE BEEN MOVING AROUND A LOT MORE THAN USUAL: MORE THAN HALF THE DAYS
7. TROUBLE CONCENTRATING ON THINGS, SUCH AS READING THE NEWSPAPER OR WATCHING TELEVISION: MORE THAN HALF THE DAYS
SUM OF ALL RESPONSES TO PHQ QUESTIONS 1-9: 14
5. POOR APPETITE OR OVEREATING: NEARLY EVERY DAY
4. FEELING TIRED OR HAVING LITTLE ENERGY: NEARLY EVERY DAY
1. LITTLE INTEREST OR PLEASURE IN DOING THINGS: MORE THAN HALF THE DAYS
8. MOVING OR SPEAKING SO SLOWLY THAT OTHER PEOPLE COULD HAVE NOTICED. OR THE OPPOSITE, BEING SO FIGETY OR RESTLESS THAT YOU HAVE BEEN MOVING AROUND A LOT MORE THAN USUAL: MORE THAN HALF THE DAYS
5. POOR APPETITE OR OVEREATING: NEARLY EVERY DAY
6. FEELING BAD ABOUT YOURSELF - OR THAT YOU ARE A FAILURE OR HAVE LET YOURSELF OR YOUR FAMILY DOWN: SEVERAL DAYS
1. LITTLE INTEREST OR PLEASURE IN DOING THINGS: MORE THAN HALF THE DAYS
7. TROUBLE CONCENTRATING ON THINGS, SUCH AS READING THE NEWSPAPER OR WATCHING TELEVISION: MORE THAN HALF THE DAYS
3. TROUBLE FALLING OR STAYING ASLEEP: NOT AT ALL
SUM OF ALL RESPONSES TO PHQ QUESTIONS 1-9: 14
4. FEELING TIRED OR HAVING LITTLE ENERGY: NEARLY EVERY DAY
10. IF YOU CHECKED OFF ANY PROBLEMS, HOW DIFFICULT HAVE THESE PROBLEMS MADE IT FOR YOU TO DO YOUR WORK, TAKE CARE OF THINGS AT HOME, OR GET ALONG WITH OTHER PEOPLE: EXTREMELY DIFFICULT
SUM OF ALL RESPONSES TO PHQ QUESTIONS 1-9: 14
10. IF YOU CHECKED OFF ANY PROBLEMS, HOW DIFFICULT HAVE THESE PROBLEMS MADE IT FOR YOU TO DO YOUR WORK, TAKE CARE OF THINGS AT HOME, OR GET ALONG WITH OTHER PEOPLE: EXTREMELY DIFFICULT
9. THOUGHTS THAT YOU WOULD BE BETTER OFF DEAD, OR OF HURTING YOURSELF: NOT AT ALL
9. THOUGHTS THAT YOU WOULD BE BETTER OFF DEAD, OR OF HURTING YOURSELF: NOT AT ALL

## 2025-01-28 ENCOUNTER — OFFICE VISIT (OUTPATIENT)
Dept: FAMILY MEDICINE CLINIC | Age: 55
End: 2025-01-28
Payer: MEDICARE

## 2025-01-28 VITALS
WEIGHT: 207.6 LBS | SYSTOLIC BLOOD PRESSURE: 118 MMHG | BODY MASS INDEX: 30.23 KG/M2 | TEMPERATURE: 98.2 F | DIASTOLIC BLOOD PRESSURE: 74 MMHG | OXYGEN SATURATION: 96 % | HEART RATE: 73 BPM | RESPIRATION RATE: 16 BRPM

## 2025-01-28 DIAGNOSIS — J44.9 CHRONIC OBSTRUCTIVE PULMONARY DISEASE, UNSPECIFIED COPD TYPE (HCC): ICD-10-CM

## 2025-01-28 DIAGNOSIS — G47.419 PRIMARY NARCOLEPSY WITHOUT CATAPLEXY: ICD-10-CM

## 2025-01-28 DIAGNOSIS — R09.89 DECREASED PEDAL PULSES: Primary | ICD-10-CM

## 2025-01-28 DIAGNOSIS — G62.9 NEUROPATHY: ICD-10-CM

## 2025-01-28 DIAGNOSIS — E11.9 TYPE 2 DIABETES MELLITUS WITHOUT COMPLICATION, WITHOUT LONG-TERM CURRENT USE OF INSULIN (HCC): ICD-10-CM

## 2025-01-28 PROCEDURE — 99214 OFFICE O/P EST MOD 30 MIN: CPT | Performed by: INTERNAL MEDICINE

## 2025-01-28 RX ORDER — BUDESONIDE AND FORMOTEROL FUMARATE 160; 4.5 UG/1; UG/1
2 AEROSOL, METERED RESPIRATORY (INHALATION) 2 TIMES DAILY
COMMUNITY
Start: 2024-11-19

## 2025-01-28 RX ORDER — DEXTROAMPHETAMINE SACCHARATE, AMPHETAMINE ASPARTATE MONOHYDRATE, DEXTROAMPHETAMINE SULFATE AND AMPHETAMINE SULFATE 5; 5; 5; 5 MG/1; MG/1; MG/1; MG/1
20 CAPSULE, EXTENDED RELEASE ORAL DAILY
Qty: 30 CAPSULE | Refills: 0 | Status: SHIPPED | OUTPATIENT
Start: 2025-01-28 | End: 2025-02-27

## 2025-01-28 RX ORDER — UBIDECARENONE 75 MG
100 CAPSULE ORAL DAILY
Qty: 30 TABLET | Refills: 3 | Status: SHIPPED | OUTPATIENT
Start: 2025-01-28 | End: 2025-05-28

## 2025-01-28 RX ORDER — VARENICLINE TARTRATE 0.5 MG/1
.5-1 TABLET, FILM COATED ORAL SEE ADMIN INSTRUCTIONS
Qty: 57 TABLET | Refills: 0 | Status: SHIPPED | OUTPATIENT
Start: 2025-01-28

## 2025-01-28 NOTE — PROGRESS NOTES
FUSION      CHOLECYSTECTOMY      KNEE SURGERY Right     TONSILLECTOMY         Review of Systems   Constitutional:  Positive for fatigue (better no longer falling asleep).   HENT:  Negative for nosebleeds.    Eyes:  Negative for visual disturbance.   Respiratory:  Positive for cough (not changing). Negative for shortness of breath.    Cardiovascular:  Positive for leg swelling. Negative for chest pain and palpitations.   Gastrointestinal:  Negative for abdominal pain, constipation, diarrhea, nausea and vomiting.   Genitourinary:  Negative for difficulty urinating.   Musculoskeletal:  Positive for back pain.   Neurological:  Positive for tremors (better). Negative for dizziness, speech difficulty, light-headedness, numbness and headaches.        Paraesthesias        OBJECTIVE:  /74 (Site: Left Upper Arm, Position: Sitting, Cuff Size: Medium Adult)   Pulse 73   Temp 98.2 °F (36.8 °C) (Oral)   Resp 16   Wt 94.2 kg (207 lb 9.6 oz)   SpO2 96%   BMI 30.23 kg/m²      Body mass index is 30.23 kg/m².     Physical Exam  Constitutional:       General: He is not in acute distress.     Appearance: Normal appearance. He is well-developed.      Comments: Asleep several times during the office visit   HENT:      Head: Normocephalic and atraumatic.      Nose: Nose normal.      Mouth/Throat:      Pharynx: Oropharynx is clear.   Eyes:      Conjunctiva/sclera: Conjunctivae normal.   Neck:      Thyroid: No thyromegaly.      Vascular: No carotid bruit or JVD.   Cardiovascular:      Rate and Rhythm: Normal rate and regular rhythm.      Heart sounds: Normal heart sounds. No murmur heard.     No gallop.      Comments: Varicosities   Decreased DP pulses   Pulmonary:      Effort: Pulmonary effort is normal.      Breath sounds: Normal breath sounds. No wheezing.   Abdominal:      General: There is no distension.      Palpations: Abdomen is soft.      Tenderness: There is no abdominal tenderness.   Musculoskeletal:         General:

## 2025-02-01 ASSESSMENT — ENCOUNTER SYMPTOMS
ABDOMINAL PAIN: 0
CONSTIPATION: 0
SHORTNESS OF BREATH: 0
VOMITING: 0
BACK PAIN: 1
COUGH: 1
NAUSEA: 0
DIARRHEA: 0

## 2025-02-10 RX ORDER — ATORVASTATIN CALCIUM 40 MG/1
40 TABLET, FILM COATED ORAL DAILY
Qty: 90 TABLET | Refills: 0 | Status: SHIPPED | OUTPATIENT
Start: 2025-02-10

## 2025-02-19 ENCOUNTER — OFFICE VISIT (OUTPATIENT)
Dept: FAMILY MEDICINE CLINIC | Age: 55
End: 2025-02-19

## 2025-02-19 VITALS
WEIGHT: 212.6 LBS | SYSTOLIC BLOOD PRESSURE: 118 MMHG | DIASTOLIC BLOOD PRESSURE: 86 MMHG | HEART RATE: 82 BPM | TEMPERATURE: 97.5 F | RESPIRATION RATE: 18 BRPM | OXYGEN SATURATION: 94 % | BODY MASS INDEX: 31.49 KG/M2 | HEIGHT: 69 IN

## 2025-02-19 DIAGNOSIS — F17.200 SMOKER: ICD-10-CM

## 2025-02-19 DIAGNOSIS — G47.419 PRIMARY NARCOLEPSY WITHOUT CATAPLEXY: ICD-10-CM

## 2025-02-19 DIAGNOSIS — J44.1 COPD EXACERBATION (HCC): Primary | ICD-10-CM

## 2025-02-19 DIAGNOSIS — H93.13 TINNITUS OF BOTH EARS: ICD-10-CM

## 2025-02-19 LAB
INFLUENZA A ANTIGEN, POC: NEGATIVE
INFLUENZA B ANTIGEN, POC: NEGATIVE
LOT EXPIRE DATE: NORMAL
LOT KIT NUMBER: NORMAL
SARS-COV-2, POC: NORMAL
VALID INTERNAL CONTROL: YES
VENDOR AND KIT NAME POC: NORMAL

## 2025-02-19 RX ORDER — VARENICLINE TARTRATE 0.5 MG/1
.5-1 TABLET, FILM COATED ORAL SEE ADMIN INSTRUCTIONS
Qty: 57 TABLET | Refills: 0 | Status: SHIPPED | OUTPATIENT
Start: 2025-02-19

## 2025-02-19 RX ORDER — BENZONATATE 100 MG/1
100 CAPSULE ORAL 3 TIMES DAILY PRN
Qty: 30 CAPSULE | Refills: 1 | Status: SHIPPED | OUTPATIENT
Start: 2025-02-19 | End: 2025-03-11

## 2025-02-19 RX ORDER — PREDNISONE 10 MG/1
TABLET ORAL
Qty: 30 TABLET | Refills: 0 | Status: SHIPPED | OUTPATIENT
Start: 2025-02-19

## 2025-02-19 RX ORDER — DOXYCYCLINE HYCLATE 100 MG
100 TABLET ORAL 2 TIMES DAILY
Qty: 20 TABLET | Refills: 0 | Status: SHIPPED | OUTPATIENT
Start: 2025-02-19 | End: 2025-03-01

## 2025-02-19 NOTE — ASSESSMENT & PLAN NOTE
Chronic, not at goal (unstable), working on cessation     Orders:    varenicline (CHANTIX) 0.5 MG tablet; Take 1-2 tablets by mouth See Admin Instructions 0.5mg DAILY for 3 days followed by 0.5mg TWICE DAILY for 4 days followed by 1mg TWICE DAILY

## 2025-02-19 NOTE — PROGRESS NOTES
Parth Chamorro (:  1970) is a 54 y.o. male,Established patient, here for evaluation of the following chief complaint(s):  Cough (Pt is here for cough with yellow/green/brown phlegm x3 weeks. States that it has gotten worse lately. States that he has also had a headache. Denies body aches, chills, head/chest congestion, or fever. )         Assessment & Plan  COPD exacerbation (HCC)    Treat with antibiotics and steroids       Tinnitus of both ears    To try Flonase and background sounds       Primary narcolepsy without cataplexy   Chronic, at goal (stable), continue current treatment plan remains on adderall and OARRS was reviewed       Smoker   Chronic, not at goal (unstable), working on cessation     Orders:    varenicline (CHANTIX) 0.5 MG tablet; Take 1-2 tablets by mouth See Admin Instructions 0.5mg DAILY for 3 days followed by 0.5mg TWICE DAILY for 4 days followed by 1mg TWICE DAILY         Assessment & Plan  1. Exacerbation of Chronic Obstructive Pulmonary Disease (COPD).  He is advised to continue using his inhalers, including Breo, daily. A prescription for an antibiotic, to be taken twice daily for 10 days, and a prednisone taper pack has been issued. A prescription for Chantix has been provided to aid in smoking cessation efforts. Cough pills have been prescribed to manage his cough. If his respiratory condition deteriorates, he should contact the clinic immediately.    2. Tinnitus.  His ears exhibit slight retraction, suggesting potential fluid accumulation. He is advised to play music or background sounds at night to alleviate the ringing in his ears. A prescription for Flonase nasal spray, to be administered as one spray in each nostril daily, has been provided. If the tinnitus persists, a referral to an ENT specialist and a hearing test will be considered.    3. Narcolepsy.  His last Adderall refill was on 2025. He is advised to contact the clinic next Wednesday for an Adderall refill.

## 2025-02-25 ENCOUNTER — TELEPHONE (OUTPATIENT)
Dept: FAMILY MEDICINE CLINIC | Age: 55
End: 2025-02-25

## 2025-02-26 NOTE — TELEPHONE ENCOUNTER
Attempted to call [employer] at [phone] twice.  Line disconnected each time.     Member ID: [id number]  
Attempted to call [employer] at [phone] twice.  Line disconnected each time.      Member ID: [id number]  
Called to do PA with [facility]. Spoke with Naomi.    PA denied d/t not meeting criteria.    Pending case #: [id number]    Physician does have the option to call [phone] to talk with another physician regarding this case.    Please advise.  
[facility]  [phone]      Wcuyg-[phone]       
Perlita with [facility] calling. Pt has appointment for   Vascular ankle brachial index (NICOLE) w/ [facility] (Order #[id number]) on 1/28/25     It will need a Prior Auth through [facility]  02/28/2025  NPI-[id number]  Procedure Code 21876  DX Code R09.89    Tdsgi-[phone]   Sue-[phone]   
social work

## 2025-02-27 ENCOUNTER — TELEPHONE (OUTPATIENT)
Dept: FAMILY MEDICINE CLINIC | Age: 55
End: 2025-02-27

## 2025-02-27 DIAGNOSIS — I73.9 CLAUDICATION OF BOTH LOWER EXTREMITIES: Primary | ICD-10-CM

## 2025-02-27 DIAGNOSIS — R20.2 PARESTHESIAS: ICD-10-CM

## 2025-02-27 DIAGNOSIS — G47.419 PRIMARY NARCOLEPSY WITHOUT CATAPLEXY: ICD-10-CM

## 2025-02-27 RX ORDER — DEXTROAMPHETAMINE SACCHARATE, AMPHETAMINE ASPARTATE MONOHYDRATE, DEXTROAMPHETAMINE SULFATE AND AMPHETAMINE SULFATE 5; 5; 5; 5 MG/1; MG/1; MG/1; MG/1
20 CAPSULE, EXTENDED RELEASE ORAL DAILY
Qty: 30 CAPSULE | Refills: 0 | Status: SHIPPED | OUTPATIENT
Start: 2025-02-27 | End: 2025-03-29

## 2025-02-27 NOTE — TELEPHONE ENCOUNTER
Sheila (wife) calling b/c pt received a phone call from [facility] that the vascular ankle branchial study index will need to be prior authorized by our office. Pt wants to know if this could be done? Pl advise.

## 2025-02-27 NOTE — TELEPHONE ENCOUNTER
Pt's wife advised that test is denied through insurance.  Please place referral as stated the other day.

## 2025-02-28 NOTE — TELEPHONE ENCOUNTER
Pt's wife advised and stated she would like for it to be sent to [facility].    Referral faxed to [facility].

## 2025-03-20 ENCOUNTER — OFFICE VISIT (OUTPATIENT)
Dept: FAMILY MEDICINE CLINIC | Age: 55
End: 2025-03-20
Payer: MEDICARE

## 2025-03-20 ENCOUNTER — RESULTS FOLLOW-UP (OUTPATIENT)
Dept: FAMILY MEDICINE CLINIC | Age: 55
End: 2025-03-20

## 2025-03-20 VITALS
RESPIRATION RATE: 16 BRPM | WEIGHT: 210 LBS | BODY MASS INDEX: 30.58 KG/M2 | DIASTOLIC BLOOD PRESSURE: 62 MMHG | SYSTOLIC BLOOD PRESSURE: 104 MMHG | TEMPERATURE: 97.6 F

## 2025-03-20 DIAGNOSIS — G25.0 ESSENTIAL TREMOR: ICD-10-CM

## 2025-03-20 DIAGNOSIS — Z79.4 TYPE 2 DIABETES MELLITUS WITHOUT COMPLICATION, WITH LONG-TERM CURRENT USE OF INSULIN: ICD-10-CM

## 2025-03-20 DIAGNOSIS — C67.1 MALIGNANT NEOPLASM OF DOME OF URINARY BLADDER (HCC): ICD-10-CM

## 2025-03-20 DIAGNOSIS — J44.9 CHRONIC OBSTRUCTIVE PULMONARY DISEASE, UNSPECIFIED COPD TYPE (HCC): ICD-10-CM

## 2025-03-20 DIAGNOSIS — E11.9 TYPE 2 DIABETES MELLITUS WITHOUT COMPLICATION, WITH LONG-TERM CURRENT USE OF INSULIN: ICD-10-CM

## 2025-03-20 DIAGNOSIS — G47.419 PRIMARY NARCOLEPSY WITHOUT CATAPLEXY: Primary | ICD-10-CM

## 2025-03-20 DIAGNOSIS — F17.200 SMOKER: ICD-10-CM

## 2025-03-20 DIAGNOSIS — R21 RASH AND NONSPECIFIC SKIN ERUPTION: ICD-10-CM

## 2025-03-20 LAB — HBA1C MFR BLD: 7.1 %

## 2025-03-20 PROCEDURE — 83036 HEMOGLOBIN GLYCOSYLATED A1C: CPT | Performed by: INTERNAL MEDICINE

## 2025-03-20 PROCEDURE — 3051F HG A1C>EQUAL 7.0%<8.0%: CPT | Performed by: INTERNAL MEDICINE

## 2025-03-20 PROCEDURE — 99214 OFFICE O/P EST MOD 30 MIN: CPT | Performed by: INTERNAL MEDICINE

## 2025-03-20 RX ORDER — GLUCOSAMINE HCL/CHONDROITIN SU 500-400 MG
CAPSULE ORAL
Qty: 100 STRIP | Refills: 1 | Status: SHIPPED | OUTPATIENT
Start: 2025-03-20

## 2025-03-20 RX ORDER — VARENICLINE TARTRATE 1 MG/1
1 TABLET, FILM COATED ORAL 2 TIMES DAILY
Qty: 180 TABLET | Refills: 1 | Status: SHIPPED | OUTPATIENT
Start: 2025-03-20

## 2025-03-20 RX ORDER — PRIMIDONE 50 MG/1
50 TABLET ORAL 2 TIMES DAILY
Qty: 180 TABLET | Refills: 1 | Status: SHIPPED | OUTPATIENT
Start: 2025-03-20

## 2025-03-20 RX ORDER — AVOBENZONE, HOMOSALATE, OCTISALATE, OCTOCRYLENE 30; 40; 45; 26 MG/ML; MG/ML; MG/ML; MG/ML
1 CREAM TOPICAL DAILY
Qty: 100 EACH | Refills: 5 | Status: SHIPPED | OUTPATIENT
Start: 2025-03-20

## 2025-03-20 RX ORDER — BLOOD-GLUCOSE METER
1 KIT MISCELLANEOUS DAILY
Qty: 1 KIT | Refills: 0 | Status: SHIPPED | OUTPATIENT
Start: 2025-03-20

## 2025-03-20 RX ORDER — BETAMETHASONE DIPROPIONATE 0.05 %
OINTMENT (GRAM) TOPICAL
Qty: 50 G | Refills: 0 | Status: SHIPPED | OUTPATIENT
Start: 2025-03-20

## 2025-03-20 RX ORDER — DEXTROAMPHETAMINE SACCHARATE, AMPHETAMINE ASPARTATE MONOHYDRATE, DEXTROAMPHETAMINE SULFATE AND AMPHETAMINE SULFATE 7.5; 7.5; 7.5; 7.5 MG/1; MG/1; MG/1; MG/1
30 CAPSULE, EXTENDED RELEASE ORAL DAILY
Qty: 30 CAPSULE | Refills: 0 | Status: SHIPPED | OUTPATIENT
Start: 2025-03-27 | End: 2025-04-26

## 2025-03-20 NOTE — PROGRESS NOTES
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 Max Daily Amount: 30 mg     Dispense:  30 capsule     Refill:  0    primidone (MYSOLINE) 50 MG tablet     Sig: Take 1 tablet by mouth in the morning and at bedtime     Dispense:  180 tablet     Refill:  1    betamethasone dipropionate 0.05 % ointment     Sig: Apply topically twice daily to affected areas     Dispense:  50 g     Refill:  0    varenicline (CHANTIX) 1 MG tablet     Sig: Take 1 tablet by mouth 2 times daily     Dispense:  180 tablet     Refill:  1    glucose monitoring kit     Si kit by Does not apply route daily Check sugars daily at alternating times of day as directed cover type covered by his insurance E11.9     Dispense:  1 kit     Refill:  0    Lancets MISC     Si each by Does not apply route daily E11.9     Dispense:  100 each     Refill:  5    blood glucose monitor strips     Sig: Check sugars daily at alternating times of day as directed   E11.9     Dispense:  100 strip     Refill:  1     Brand per patient preference. May round up to next available package size.        Return in about 3 months (around 2025), or if symptoms worsen or fail to improve.     There are no Patient Instructions on file for this visit.

## 2025-03-20 NOTE — ASSESSMENT & PLAN NOTE
On inhalers to continue to work on smoking cessation     Orders:    varenicline (CHANTIX) 1 MG tablet; Take 1 tablet by mouth 2 times daily

## 2025-03-20 NOTE — ASSESSMENT & PLAN NOTE
Hga1c 7,1  continue meds the dame diet     Orders:    POCT glycosylated hemoglobin (Hb A1C)    Lancets MISC; 1 each by Does not apply route daily E11.9

## 2025-04-03 ENCOUNTER — TELEPHONE (OUTPATIENT)
Dept: FAMILY MEDICINE CLINIC | Age: 55
End: 2025-04-03

## 2025-04-04 NOTE — TELEPHONE ENCOUNTER
I did get his vascular studies back from [facility].  Overall the right leg is completely normal.  Left leg with only some mild decreased blood flow.  Question how his symptoms are doing

## 2025-04-04 NOTE — TELEPHONE ENCOUNTER
YAHIR patient regarding results. He states his left leg is improved. Patient denies any swelling, redness, pain, or temperature changed. Patient does not some mild intermittent numbness in the left foot. Patient will call if his symptoms worsen or do not resolve.

## 2025-04-13 ENCOUNTER — PATIENT MESSAGE (OUTPATIENT)
Dept: FAMILY MEDICINE CLINIC | Age: 55
End: 2025-04-13

## 2025-04-14 ENCOUNTER — TELEPHONE (OUTPATIENT)
Dept: FAMILY MEDICINE CLINIC | Age: 55
End: 2025-04-14

## 2025-04-14 NOTE — TELEPHONE ENCOUNTER
When is my next lab routine lab work due? My annual well visit was in Nov 2024. Would I be due in May since that's 6 months? Thanks Parth

## 2025-04-14 NOTE — TELEPHONE ENCOUNTER
We did a hga1c with visit in march as all labs were good we could probably wait until November as long as you are feeling fine

## 2025-04-15 ENCOUNTER — OFFICE VISIT (OUTPATIENT)
Dept: SLEEP MEDICINE | Age: 55
End: 2025-04-15
Payer: MEDICARE

## 2025-04-15 VITALS
DIASTOLIC BLOOD PRESSURE: 70 MMHG | OXYGEN SATURATION: 95 % | TEMPERATURE: 98.9 F | BODY MASS INDEX: 30.18 KG/M2 | HEART RATE: 80 BPM | SYSTOLIC BLOOD PRESSURE: 100 MMHG | WEIGHT: 215.6 LBS | RESPIRATION RATE: 18 BRPM | HEIGHT: 71 IN

## 2025-04-15 DIAGNOSIS — G47.19 EXCESSIVE DAYTIME SLEEPINESS: ICD-10-CM

## 2025-04-15 DIAGNOSIS — G47.33 OSA (OBSTRUCTIVE SLEEP APNEA): Primary | ICD-10-CM

## 2025-04-15 DIAGNOSIS — R53.83 FATIGUE, UNSPECIFIED TYPE: ICD-10-CM

## 2025-04-15 DIAGNOSIS — Z91.89 AT RISK FOR CENTRAL SLEEP APNEA: ICD-10-CM

## 2025-04-15 DIAGNOSIS — F11.90 CHRONIC, CONTINUOUS USE OF OPIOIDS: ICD-10-CM

## 2025-04-15 DIAGNOSIS — G47.10 HYPERSOMNIA: ICD-10-CM

## 2025-04-15 DIAGNOSIS — G47.33 OVERLAP SYNDROME OF OBSTRUCTIVE SLEEP APNEA AND CHRONIC OBSTRUCTIVE PULMONARY DISEASE (HCC): ICD-10-CM

## 2025-04-15 DIAGNOSIS — J44.9 OVERLAP SYNDROME OF OBSTRUCTIVE SLEEP APNEA AND CHRONIC OBSTRUCTIVE PULMONARY DISEASE (HCC): ICD-10-CM

## 2025-04-15 DIAGNOSIS — G47.34 NOCTURNAL HYPOXEMIA: ICD-10-CM

## 2025-04-15 PROCEDURE — 99204 OFFICE O/P NEW MOD 45 MIN: CPT | Performed by: PSYCHIATRY & NEUROLOGY

## 2025-04-15 ASSESSMENT — SLEEP AND FATIGUE QUESTIONNAIRES
HOW LIKELY ARE YOU TO NOD OFF OR FALL ASLEEP WHILE SITTING QUIETLY AFTER LUNCH WITHOUT ALCOHOL: HIGH CHANCE OF DOZING
HOW LIKELY ARE YOU TO NOD OFF OR FALL ASLEEP WHILE SITTING QUIETLY AFTER LUNCH WITHOUT ALCOHOL: HIGH CHANCE OF DOZING
HOW LIKELY ARE YOU TO NOD OFF OR FALL ASLEEP WHILE SITTING INACTIVE IN A PUBLIC PLACE: HIGH CHANCE OF DOZING
HOW LIKELY ARE YOU TO NOD OFF OR FALL ASLEEP WHILE WATCHING TV: MODERATE CHANCE OF DOZING
HOW LIKELY ARE YOU TO NOD OFF OR FALL ASLEEP WHILE LYING DOWN TO REST IN THE AFTERNOON WHEN CIRCUMSTANCES PERMIT: HIGH CHANCE OF DOZING
HOW LIKELY ARE YOU TO NOD OFF OR FALL ASLEEP WHEN YOU ARE A PASSENGER IN A CAR FOR AN HOUR WITHOUT A BREAK: MODERATE CHANCE OF DOZING
HOW LIKELY ARE YOU TO NOD OFF OR FALL ASLEEP WHILE SITTING AND TALKING TO SOMEONE: SLIGHT CHANCE OF DOZING
HOW LIKELY ARE YOU TO NOD OFF OR FALL ASLEEP IN A CAR, WHILE STOPPED FOR A FEW MINUTES IN TRAFFIC: SLIGHT CHANCE OF DOZING
HOW LIKELY ARE YOU TO NOD OFF OR FALL ASLEEP WHILE LYING DOWN TO REST IN THE AFTERNOON WHEN CIRCUMSTANCES PERMIT: HIGH CHANCE OF DOZING
HOW LIKELY ARE YOU TO NOD OFF OR FALL ASLEEP WHEN YOU ARE A PASSENGER IN A CAR FOR AN HOUR WITHOUT A BREAK: MODERATE CHANCE OF DOZING
HOW LIKELY ARE YOU TO NOD OFF OR FALL ASLEEP WHILE SITTING AND TALKING TO SOMEONE: SLIGHT CHANCE OF DOZING
HOW LIKELY ARE YOU TO NOD OFF OR FALL ASLEEP WHILE SITTING AND READING: HIGH CHANCE OF DOZING
HOW LIKELY ARE YOU TO NOD OFF OR FALL ASLEEP WHILE WATCHING TV: MODERATE CHANCE OF DOZING
HOW LIKELY ARE YOU TO NOD OFF OR FALL ASLEEP IN A CAR, WHILE STOPPED FOR A FEW MINUTES IN TRAFFIC: SLIGHT CHANCE OF DOZING
ESS TOTAL SCORE: 18
HOW LIKELY ARE YOU TO NOD OFF OR FALL ASLEEP WHILE SITTING INACTIVE IN A PUBLIC PLACE: HIGH CHANCE OF DOZING
HOW LIKELY ARE YOU TO NOD OFF OR FALL ASLEEP WHILE SITTING AND READING: HIGH CHANCE OF DOZING

## 2025-04-15 ASSESSMENT — ENCOUNTER SYMPTOMS
EYES NEGATIVE: 1
SORE THROAT: 1
APNEA: 1
GASTROINTESTINAL NEGATIVE: 1
SHORTNESS OF BREATH: 1
WHEEZING: 1
BACK PAIN: 1

## 2025-04-15 NOTE — PROGRESS NOTES
Provider, Historical, MD       Allergies as of 04/15/2025 - Fully Reviewed 04/15/2025   Allergen Reaction Noted    Lyrica [pregabalin] Other (See Comments) 12/06/2018    Wellbutrin [bupropion] Other (See Comments) 11/09/2016       Patient Active Problem List   Diagnosis    Malignant neoplasm of dome of urinary bladder ([facility])    Disc disease with myelopathy, cervical    Post-traumatic osteoarthritis of knee    Chronic obstructive pulmonary disease ([facility])    Chronic pain syndrome    Major depressive disorder with single episode, in partial remission    Smoker    Prepatellar bursitis of right knee    Congenital anomaly of larynx    Neuropathy    Hypercholesteremia    Cervical post-laminectomy syndrome    Lumbar spondylosis    Type 2 diabetes mellitus without complication, with long-term current use of insulin    Mixed hyperlipidemia    Hypersomnia    Nocturnal hypoxemia       Past Medical History:   Diagnosis Date    Bladder carcinoma     sees Dr Neal yearly    Cervical disc disease     COPD (chronic obstructive pulmonary disease) ([facility])     mild but still smoking    Depression     Hyperlipidemia     Lumbar disc disease     Osteoarthritis     Type 2 diabetes mellitus, controlled ([facility]) 09/04/2018       Past Surgical History:   Procedure Laterality Date    BLADDER TUMOR EXCISION      CARPAL TUNNEL RELEASE Bilateral 06/2015    CERVICAL DISCECTOMY  11/18/2016    CERVICAL FUSION      CHOLECYSTECTOMY      KNEE SURGERY Right     TONSILLECTOMY         Family History   Problem Relation Age of Onset    COPD Mother     Depression Father         manic    Cancer Maternal Uncle         does not know the type     COPD Maternal Grandmother     Coronary Art Dis Maternal Grandfather     Cancer Maternal Grandfather         skin cancer     Diabetes Paternal Grandmother     No Known Problems Paternal Grandfather        Review of Systems   Constitutional:  Positive for diaphoresis and fatigue.   HENT:  Positive for congestion and sore throat.

## 2025-04-15 NOTE — PATIENT INSTRUCTIONS
Orders Placed This Encounter   Procedures    Baseline Diagnostic Sleep Study     Standing Status:   Future     Expected Date:   4/15/2025     Expiration Date:   4/15/2026     Adult or Pediatric:   Adult Study (>7 Years)     Location For Sleep Study:   [facility] Location:   [facility]    Sleep Study with PAP Titration     Standing Status:   Future     Expected Date:   4/15/2025     Expiration Date:   4/15/2026     Sleep Study Titration Type:   CPAP     Location For Sleep Study:   [facility] Location:   [facility]

## 2025-04-29 ENCOUNTER — HOSPITAL ENCOUNTER (OUTPATIENT)
Dept: SLEEP CENTER | Age: 55
Discharge: HOME | End: 2025-04-29
Attending: PSYCHIATRY & NEUROLOGY
Payer: MEDICARE

## 2025-04-29 DIAGNOSIS — Z91.89 AT RISK FOR CENTRAL SLEEP APNEA: ICD-10-CM

## 2025-04-29 DIAGNOSIS — J44.9 OVERLAP SYNDROME OF OBSTRUCTIVE SLEEP APNEA AND CHRONIC OBSTRUCTIVE PULMONARY DISEASE (HCC): ICD-10-CM

## 2025-04-29 DIAGNOSIS — F11.90 CHRONIC, CONTINUOUS USE OF OPIOIDS: ICD-10-CM

## 2025-04-29 DIAGNOSIS — G47.33 OSA (OBSTRUCTIVE SLEEP APNEA): ICD-10-CM

## 2025-04-29 DIAGNOSIS — G47.33 OVERLAP SYNDROME OF OBSTRUCTIVE SLEEP APNEA AND CHRONIC OBSTRUCTIVE PULMONARY DISEASE (HCC): ICD-10-CM

## 2025-04-29 PROCEDURE — 95810 POLYSOM 6/> YRS 4/> PARAM: CPT

## 2025-04-30 ENCOUNTER — TELEPHONE (OUTPATIENT)
Dept: FAMILY MEDICINE CLINIC | Age: 55
End: 2025-04-30

## 2025-04-30 ENCOUNTER — PATIENT MESSAGE (OUTPATIENT)
Dept: FAMILY MEDICINE CLINIC | Age: 55
End: 2025-04-30

## 2025-04-30 DIAGNOSIS — E11.9 TYPE 2 DIABETES MELLITUS WITHOUT COMPLICATION, WITH LONG-TERM CURRENT USE OF INSULIN (HCC): ICD-10-CM

## 2025-04-30 DIAGNOSIS — Z79.4 TYPE 2 DIABETES MELLITUS WITHOUT COMPLICATION, WITH LONG-TERM CURRENT USE OF INSULIN (HCC): ICD-10-CM

## 2025-04-30 DIAGNOSIS — G47.419 PRIMARY NARCOLEPSY WITHOUT CATAPLEXY: ICD-10-CM

## 2025-04-30 PROBLEM — G47.39 COMPLEX SLEEP APNEA SYNDROME: Status: ACTIVE | Noted: 2025-04-30

## 2025-04-30 PROBLEM — R09.02 HYPOXEMIA: Status: ACTIVE | Noted: 2025-04-30

## 2025-04-30 RX ORDER — DEXTROAMPHETAMINE SACCHARATE, AMPHETAMINE ASPARTATE MONOHYDRATE, DEXTROAMPHETAMINE SULFATE AND AMPHETAMINE SULFATE 7.5; 7.5; 7.5; 7.5 MG/1; MG/1; MG/1; MG/1
30 CAPSULE, EXTENDED RELEASE ORAL DAILY
Qty: 30 CAPSULE | Refills: 0 | Status: SHIPPED | OUTPATIENT
Start: 2025-04-30 | End: 2025-05-30

## 2025-04-30 NOTE — TELEPHONE ENCOUNTER
I'm out of Jardiance but the cost of $400 & I can't afford this. Is there some way to get assistance with paying for this?

## 2025-04-30 NOTE — TELEPHONE ENCOUNTER
I would call and see why it is expensive questions if it is a deductible issue?  Or formulary issue it still looks like it is the preferred one in his class here

## 2025-05-01 ENCOUNTER — TELEPHONE (OUTPATIENT)
Dept: PULMONOLOGY | Age: 55
End: 2025-05-01

## 2025-05-01 ENCOUNTER — TELEPHONE (OUTPATIENT)
Dept: FAMILY MEDICINE CLINIC | Age: 55
End: 2025-05-01

## 2025-05-01 NOTE — TELEPHONE ENCOUNTER
PSG Sleep study showed mild FAROOQ (on a scale of mild, moderate and severe).  AHI was 7.8  per hr. (Average times per hr breathing was obstructed).  O2 Desaturations to 87% (lowest o2)   Dr Recommends:    Follow up with the patient's sleep physician to discuss results      Avoid sedatives, alcohol and caffeinated drinks at bedtime.    Recommend:  titration     Sleep Lab used: [facility]    Avoid driving while sleepy.       LMOM to call

## 2025-05-01 NOTE — TELEPHONE ENCOUNTER
Received CT screening of his chest.  Only micronodules which are very small.  Are seen multilevel disc disease in his neck which he is aware of.  No sign of cancer.  Repeat scanning in a year

## 2025-05-01 NOTE — TELEPHONE ENCOUNTER
Called [facility] who stated that they didn't have a prescription on file.    New prescription sent.    Called pt and LM informing to call office back.    Need to inform pt that new prescription was sent to pharmacy and that they will call if they have any issues.

## 2025-05-02 RX ORDER — ATORVASTATIN CALCIUM 40 MG/1
40 TABLET, FILM COATED ORAL DAILY
Qty: 90 TABLET | Refills: 0 | Status: SHIPPED | OUTPATIENT
Start: 2025-05-02

## 2025-05-19 ENCOUNTER — TELEPHONE (OUTPATIENT)
Dept: FAMILY MEDICINE CLINIC | Age: 55
End: 2025-05-19

## 2025-05-19 NOTE — TELEPHONE ENCOUNTER
Pt would like to know if it is okay to take a supplement that is cinnamon with meds that she is on ? She states that he heard it gives a lot of benefit on taking cinnamon ?

## 2025-05-20 ENCOUNTER — HOSPITAL ENCOUNTER (OUTPATIENT)
Dept: SLEEP CENTER | Age: 55
Discharge: HOME | End: 2025-05-20
Payer: MEDICARE

## 2025-05-20 DIAGNOSIS — J44.9 OVERLAP SYNDROME OF OBSTRUCTIVE SLEEP APNEA AND CHRONIC OBSTRUCTIVE PULMONARY DISEASE (HCC): ICD-10-CM

## 2025-05-20 DIAGNOSIS — G47.33 OVERLAP SYNDROME OF OBSTRUCTIVE SLEEP APNEA AND CHRONIC OBSTRUCTIVE PULMONARY DISEASE (HCC): ICD-10-CM

## 2025-05-20 DIAGNOSIS — F11.90 CHRONIC, CONTINUOUS USE OF OPIOIDS: ICD-10-CM

## 2025-05-20 DIAGNOSIS — G47.33 OSA (OBSTRUCTIVE SLEEP APNEA): ICD-10-CM

## 2025-05-20 DIAGNOSIS — Z91.89 AT RISK FOR CENTRAL SLEEP APNEA: ICD-10-CM

## 2025-05-20 PROCEDURE — 95811 POLYSOM 6/>YRS CPAP 4/> PARM: CPT

## 2025-05-22 PROBLEM — G47.33 OSA (OBSTRUCTIVE SLEEP APNEA): Status: ACTIVE | Noted: 2025-04-30

## 2025-05-22 NOTE — PROGRESS NOTES
Parth Chamorro         : 1970  [] [facility]     [] [facility]      [] [facility]     [][facility]    [] [facility]  [] [facility]   [] [facility]  [] [facility] [] [employer] [] [facility] [] [facility] [] [employer] [] [facility]  Diagnosis: [x] FAROOQ (G47.33) [] CSA (G47.31) [] Apnea (G47.30)   Length of Need: [] 12 Months [x] 99 Months [] Other:    Machine (NANI!):  [x] ResMed AirSense     Auto [] Other:     [x]  CPAP () [] Bilevel ()   Mode: [x] Auto [] Spontaneous    Mode: [] Auto [] Spontaneous           Between 9 and 14 cm                 Comfort Settings:     If the order for CPAP  - Ramp Pressure: 5 cmH2O                                        - Ramp time: 15 min                                     -  Flex/EPR - 3 full time       Humidifier: [x] Heated ()        [x] Water chamber replacement ()/ 1 per 6 months        Mask:  Please always start with the mask the patient used during the titraion   [x] Nasal () /1 per 3 months    [x] Patient choice -Size and fit mask    [x] Dispense: medium Airfit P10 nasal pillows    [x] Headgear () / 1 per 6 months        [x] Replacement Nasal Pillows ()/2 per month         Tubing: [x] Heated ()/1 per 3 months    [] Standard ()/1 per 3 months [] Other:           Filters: [x] Non-disposable ()/1 per 6 months     [x] Ultra-Fine, Disposable ()/2 per month        Miscellaneous: [x] Chin Strap ()/ 1 per 6 months [] O2 bleed-in:       LPM   [] Oximetry on CPAP/Bilevel []  Other:          Start Order Date: 25    MEDICAL JUSTIFICATION:  I, the undersigned, certify that the above prescribed supplies are medically necessary for this patient’s wellbeing.  In my opinion, the supplies are both reasonable and necessary in reference to accepted standards of medicalpractice in treatment of this patient’s condition.    Red Joseph MD      NPI: [id number]       Order Signed Date:

## 2025-05-27 ENCOUNTER — TELEPHONE (OUTPATIENT)
Dept: PULMONOLOGY | Age: 55
End: 2025-05-27

## 2025-05-27 NOTE — TELEPHONE ENCOUNTER
Titration study shows adequate control of the events at a CPAP pressure of 9/14 cm.      NEED DME:     Patient will need appointment 31-90 days after starting new machine    LMOM to call

## 2025-06-02 RX ORDER — DULOXETIN HYDROCHLORIDE 60 MG/1
60 CAPSULE, DELAYED RELEASE ORAL 2 TIMES DAILY
Qty: 180 CAPSULE | Refills: 0 | Status: SHIPPED | OUTPATIENT
Start: 2025-06-02

## 2025-06-02 RX ORDER — ROPINIROLE 2 MG/1
2 TABLET, FILM COATED ORAL NIGHTLY
Qty: 90 TABLET | Refills: 0 | Status: SHIPPED | OUTPATIENT
Start: 2025-06-02

## 2025-06-07 DIAGNOSIS — G62.9 NEUROPATHY: ICD-10-CM

## 2025-06-09 DIAGNOSIS — G47.419 PRIMARY NARCOLEPSY WITHOUT CATAPLEXY: ICD-10-CM

## 2025-06-10 RX ORDER — DEXTROAMPHETAMINE SACCHARATE, AMPHETAMINE ASPARTATE MONOHYDRATE, DEXTROAMPHETAMINE SULFATE AND AMPHETAMINE SULFATE 7.5; 7.5; 7.5; 7.5 MG/1; MG/1; MG/1; MG/1
30 CAPSULE, EXTENDED RELEASE ORAL DAILY
Qty: 30 CAPSULE | Refills: 0 | Status: SHIPPED | OUTPATIENT
Start: 2025-06-10 | End: 2025-07-10

## 2025-06-17 NOTE — TELEPHONE ENCOUNTER
[facility]    Please send PAP order to [facility].

## 2025-06-25 ENCOUNTER — OFFICE VISIT (OUTPATIENT)
Dept: FAMILY MEDICINE CLINIC | Age: 55
End: 2025-06-25
Payer: MEDICARE

## 2025-06-25 ENCOUNTER — RESULTS FOLLOW-UP (OUTPATIENT)
Dept: FAMILY MEDICINE CLINIC | Age: 55
End: 2025-06-25

## 2025-06-25 ENCOUNTER — PATIENT MESSAGE (OUTPATIENT)
Dept: FAMILY MEDICINE CLINIC | Age: 55
End: 2025-06-25

## 2025-06-25 VITALS
OXYGEN SATURATION: 95 % | TEMPERATURE: 97.8 F | DIASTOLIC BLOOD PRESSURE: 78 MMHG | BODY MASS INDEX: 29.68 KG/M2 | WEIGHT: 212 LBS | HEART RATE: 82 BPM | HEIGHT: 71 IN | SYSTOLIC BLOOD PRESSURE: 120 MMHG

## 2025-06-25 DIAGNOSIS — E55.9 VITAMIN D DEFICIENCY: ICD-10-CM

## 2025-06-25 DIAGNOSIS — G47.33 OBSTRUCTIVE SLEEP APNEA SYNDROME: Primary | ICD-10-CM

## 2025-06-25 DIAGNOSIS — E78.00 HYPERCHOLESTEREMIA: ICD-10-CM

## 2025-06-25 DIAGNOSIS — G47.10 SLEEP APNEA WITH HYPERSOMNOLENCE: ICD-10-CM

## 2025-06-25 DIAGNOSIS — F17.200 SMOKER: ICD-10-CM

## 2025-06-25 DIAGNOSIS — Z79.899 MEDICATION MANAGEMENT: ICD-10-CM

## 2025-06-25 DIAGNOSIS — J44.9 CHRONIC OBSTRUCTIVE PULMONARY DISEASE, UNSPECIFIED COPD TYPE (HCC): ICD-10-CM

## 2025-06-25 DIAGNOSIS — E53.8 B12 DEFICIENCY: ICD-10-CM

## 2025-06-25 DIAGNOSIS — R25.2 LEG CRAMPS: ICD-10-CM

## 2025-06-25 DIAGNOSIS — E11.9 TYPE 2 DIABETES MELLITUS WITHOUT COMPLICATION, WITH LONG-TERM CURRENT USE OF INSULIN (HCC): ICD-10-CM

## 2025-06-25 DIAGNOSIS — Z79.4 TYPE 2 DIABETES MELLITUS WITHOUT COMPLICATION, WITH LONG-TERM CURRENT USE OF INSULIN (HCC): ICD-10-CM

## 2025-06-25 DIAGNOSIS — G47.30 SLEEP APNEA WITH HYPERSOMNOLENCE: ICD-10-CM

## 2025-06-25 LAB — HBA1C MFR BLD: 7.4 %

## 2025-06-25 PROCEDURE — 3051F HG A1C>EQUAL 7.0%<8.0%: CPT | Performed by: INTERNAL MEDICINE

## 2025-06-25 PROCEDURE — 83036 HEMOGLOBIN GLYCOSYLATED A1C: CPT | Performed by: INTERNAL MEDICINE

## 2025-06-25 PROCEDURE — 99214 OFFICE O/P EST MOD 30 MIN: CPT | Performed by: INTERNAL MEDICINE

## 2025-06-25 RX ORDER — VARENICLINE TARTRATE 1 MG/1
1 TABLET, FILM COATED ORAL 2 TIMES DAILY
Qty: 180 TABLET | Refills: 1 | Status: SHIPPED | OUTPATIENT
Start: 2025-06-25

## 2025-06-25 RX ORDER — ALBUTEROL SULFATE 90 UG/1
2 INHALANT RESPIRATORY (INHALATION) 4 TIMES DAILY PRN
Qty: 18 G | Refills: 5 | Status: SHIPPED | OUTPATIENT
Start: 2025-06-25

## 2025-06-25 NOTE — ASSESSMENT & PLAN NOTE
Not using inhalers     Orders:    varenicline (CHANTIX) 1 MG tablet; Take 1 tablet by mouth 2 times daily

## 2025-06-25 NOTE — PROGRESS NOTES
Parth Chamorro (:  1970) is a 54 y.o. male,Established patient, here for evaluation of the following chief complaint(s):  Medication Management, Diabetes, Hand Problem (Possible wart on right hand), and Leg Problem (Pt c/o leg cramping after eating black licorice felt like a erickson horse)         Assessment & Plan  Type 2 diabetes mellitus without complication, with long-term current use of insulin (HCC)   Hga1c 7.1 in March working on diet     Orders:    Albumin/Creatinine Ratio, Urine; Future    Hemoglobin A1C; Future    POCT glycosylated hemoglobin (Hb A1C)    Chronic obstructive pulmonary disease, unspecified COPD type (HCC)   Not using inhalers     Orders:    varenicline (CHANTIX) 1 MG tablet; Take 1 tablet by mouth 2 times daily    Smoker   Working on cessation     Orders:    varenicline (CHANTIX) 1 MG tablet; Take 1 tablet by mouth 2 times daily    Obstructive sleep apnea syndrome   Getting set up with cpap          Sleep apnea with hypersomnolence   Wanting to change formulation of adderal to quicker release twice daily          Medication management   Reviewed [facility]AR[facility] on adderall and pain meds through pain management Dr Gordillo     Orders:    Complete Urine Tox Screen; Future    Hypercholesteremia   On lipitor 40    Orders:    Comprehensive Metabolic Panel; Future    Lipid Panel; Future    Leg cramps   Stretches and hydration     Orders:    Magnesium; Future    B12 deficiency   On supplement     Orders:    Vitamin B12; Future    Vitamin D deficiency   On supplement     Orders:    Vitamin D 25 Hydroxy; Future         Assessment & Plan  1. Diabetes mellitus: Stable. Blood glucose levels are currently satisfactory, albeit with a slight upward trend at 7.4.  - Advised to reduce intake of sweets.  - Will check blood glucose levels before the next physical exam.    2. Chronic obstructive pulmonary disease (COPD): Chronic. Continues to experience wheezing and persists in smoking, despite attempts to quit

## 2025-06-25 NOTE — ASSESSMENT & PLAN NOTE
Working on cessation     Orders:    varenicline (CHANTIX) 1 MG tablet; Take 1 tablet by mouth 2 times daily

## 2025-06-25 NOTE — ASSESSMENT & PLAN NOTE
Hga1c 7.1 in March working on diet     Orders:    Albumin/Creatinine Ratio, Urine; Future    Hemoglobin A1C; Future    POCT glycosylated hemoglobin (Hb A1C)

## 2025-06-26 ENCOUNTER — TELEPHONE (OUTPATIENT)
Dept: FAMILY MEDICINE CLINIC | Age: 55
End: 2025-06-26

## 2025-06-26 RX ORDER — BUDESONIDE AND FORMOTEROL FUMARATE DIHYDRATE 160; 4.5 UG/1; UG/1
2 AEROSOL RESPIRATORY (INHALATION) 2 TIMES DAILY
Qty: 10.2 G | Refills: 3 | Status: SHIPPED | OUTPATIENT
Start: 2025-06-26

## 2025-06-26 NOTE — TELEPHONE ENCOUNTER
Hi this is Geraldine. At Parth's appt with Dr Moore yesterday, she sent in a prescription for Breztri to [facility] by accident (cost is $127.48). It was supposed to be for Malinda to go along with him trying to continue with his quit smoking goal & he was wheezing. Could he please get Breyna sent in to [facility].

## 2025-06-26 NOTE — TELEPHONE ENCOUNTER
Called pt and informed that Dr. Moore has never refilled Breyna inhaler for pt before.    Advised pt to reach out to Pulmonology to get a refill as they would keep track of that.    Pt gave verbal understanding.

## 2025-06-27 ENCOUNTER — TELEPHONE (OUTPATIENT)
Dept: ADMINISTRATIVE | Age: 55
End: 2025-06-27

## 2025-06-27 NOTE — TELEPHONE ENCOUNTER
Submitted PA for Varenicline Tartrate 1MG tablets   Via CM Key: [id number] STATUS: PA Case: [id number], Status: Approved, Coverage Starts on: 3/28/2025 12:00:00 AM, Coverage Ends on: 9/25/2025 12:00:00 AM.     Please notify patient. Thank you.

## 2025-07-06 DIAGNOSIS — G62.9 NEUROPATHY: ICD-10-CM

## 2025-07-11 ENCOUNTER — PATIENT MESSAGE (OUTPATIENT)
Dept: FAMILY MEDICINE CLINIC | Age: 55
End: 2025-07-11

## 2025-07-11 DIAGNOSIS — G47.419 PRIMARY NARCOLEPSY WITHOUT CATAPLEXY: ICD-10-CM

## 2025-07-11 RX ORDER — DEXTROAMPHETAMINE SACCHARATE, AMPHETAMINE ASPARTATE, DEXTROAMPHETAMINE SULFATE AND AMPHETAMINE SULFATE 5; 5; 5; 5 MG/1; MG/1; MG/1; MG/1
TABLET ORAL
Qty: 60 TABLET | Refills: 0 | Status: SHIPPED | OUTPATIENT
Start: 2025-07-11 | End: 2025-08-11

## 2025-07-11 RX ORDER — DEXTROAMPHETAMINE SACCHARATE, AMPHETAMINE ASPARTATE MONOHYDRATE, DEXTROAMPHETAMINE SULFATE AND AMPHETAMINE SULFATE 7.5; 7.5; 7.5; 7.5 MG/1; MG/1; MG/1; MG/1
30 CAPSULE, EXTENDED RELEASE ORAL DAILY
Qty: 30 CAPSULE | Refills: 0 | Status: CANCELLED | OUTPATIENT
Start: 2025-07-11 | End: 2025-08-10

## 2025-07-11 NOTE — TELEPHONE ENCOUNTER
RE: Adderral   Please send Adderall prescription, with new dose, into [facility]. I took my last pill today, will pick it up tomorrow. Thanks, Parth    Last OV: 6-  Next OV: 9-   Please advise on RX dosage?

## 2025-07-11 NOTE — TELEPHONE ENCOUNTER
Last note on 6/25/2025 stated \"Dosage adjusted to 20 mg of short-acting Adderall twice daily upon next refill\"  I will placed order    Controlled Substance Monitoring:    Acute and Chronic Pain Monitoring:   RX Monitoring Periodic Controlled Substance Monitoring   11/19/2023  10:01 PM Potential drug abuse or diversion identified, see note documentation.;Assessed functional status (ability to engage in work or other purposeful activities, the pain intensity and its interference with activities of daily living, quality of family life and social activities, and the physical activity)

## 2025-07-21 RX ORDER — ATORVASTATIN CALCIUM 40 MG/1
40 TABLET, FILM COATED ORAL DAILY
Qty: 90 TABLET | Refills: 0 | Status: SHIPPED | OUTPATIENT
Start: 2025-07-21

## 2025-08-01 RX ORDER — DIVALPROEX SODIUM 250 MG/1
250 TABLET, DELAYED RELEASE ORAL NIGHTLY
Qty: 90 TABLET | Refills: 0 | Status: SHIPPED | OUTPATIENT
Start: 2025-08-01

## 2025-08-01 RX ORDER — METFORMIN HYDROCHLORIDE 500 MG/1
TABLET, EXTENDED RELEASE ORAL
Qty: 360 TABLET | Refills: 0 | Status: SHIPPED | OUTPATIENT
Start: 2025-08-01

## 2025-08-13 ENCOUNTER — TELEPHONE (OUTPATIENT)
Dept: FAMILY MEDICINE CLINIC | Age: 55
End: 2025-08-13

## 2025-08-13 DIAGNOSIS — G47.419 PRIMARY NARCOLEPSY WITHOUT CATAPLEXY: ICD-10-CM

## 2025-08-13 DIAGNOSIS — R25.1 TREMORS OF NERVOUS SYSTEM: Primary | ICD-10-CM

## 2025-08-13 RX ORDER — DEXTROAMPHETAMINE SACCHARATE, AMPHETAMINE ASPARTATE, DEXTROAMPHETAMINE SULFATE AND AMPHETAMINE SULFATE 5; 5; 5; 5 MG/1; MG/1; MG/1; MG/1
TABLET ORAL
Qty: 60 TABLET | Refills: 0 | Status: SHIPPED | OUTPATIENT
Start: 2025-08-13 | End: 2025-09-13

## 2025-08-19 DIAGNOSIS — G62.9 NEUROPATHY: ICD-10-CM

## 2025-09-04 RX ORDER — DULOXETIN HYDROCHLORIDE 60 MG/1
60 CAPSULE, DELAYED RELEASE ORAL 2 TIMES DAILY
Qty: 180 CAPSULE | Refills: 0 | Status: SHIPPED | OUTPATIENT
Start: 2025-09-04

## 2025-09-04 RX ORDER — ROPINIROLE 2 MG/1
2 TABLET, FILM COATED ORAL NIGHTLY
Qty: 90 TABLET | Refills: 0 | Status: SHIPPED | OUTPATIENT
Start: 2025-09-04